# Patient Record
Sex: FEMALE | Employment: UNEMPLOYED | URBAN - METROPOLITAN AREA
[De-identification: names, ages, dates, MRNs, and addresses within clinical notes are randomized per-mention and may not be internally consistent; named-entity substitution may affect disease eponyms.]

---

## 2021-09-21 ENCOUNTER — TELEPHONE (OUTPATIENT)
Dept: NEUROLOGY | Facility: CLINIC | Age: 56
End: 2021-09-21

## 2021-09-21 NOTE — TELEPHONE ENCOUNTER
Pt dropped off disc of CT Brain dated 9/18/21 & CT Cervical dated 5/2/21 from [facility].  Uploaded disc and returned to pt. Copied reports gave back to pt. Placed copy of reports on Dr. Yannick Catherine desk for review.

## 2021-10-11 ENCOUNTER — OFFICE VISIT (OUTPATIENT)
Dept: NEUROLOGY | Facility: CLINIC | Age: 56
End: 2021-10-11
Payer: MEDICAID

## 2021-10-11 ENCOUNTER — TELEPHONE (OUTPATIENT)
Dept: NEUROLOGY | Facility: CLINIC | Age: 56
End: 2021-10-11

## 2021-10-11 VITALS
DIASTOLIC BLOOD PRESSURE: 84 MMHG | RESPIRATION RATE: 16 BRPM | WEIGHT: 143 LBS | HEART RATE: 78 BPM | SYSTOLIC BLOOD PRESSURE: 126 MMHG

## 2021-10-11 DIAGNOSIS — R51.9 INTRACTABLE EPISODIC HEADACHE, UNSPECIFIED HEADACHE TYPE: ICD-10-CM

## 2021-10-11 DIAGNOSIS — R41.89 COGNITIVE DEFICITS: ICD-10-CM

## 2021-10-11 DIAGNOSIS — R42 DIZZINESS: Primary | ICD-10-CM
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
## 2021-10-11 PROCEDURE — 3074F SYST BP LT 130 MM HG: CPT | Performed by: OTHER

## 2021-10-11 PROCEDURE — 99205 OFFICE O/P NEW HI 60 MIN: CPT | Performed by: OTHER

## 2021-10-11 PROCEDURE — 3079F DIAST BP 80-89 MM HG: CPT | Performed by: OTHER

## 2021-10-11 RX ORDER — SUMATRIPTAN 50 MG/1
TABLET, FILM COATED ORAL
Qty: 9 TABLET | Refills: 0 | Status: SHIPPED | OUTPATIENT
Start: 2021-10-11 | End: 2021-11-17

## 2021-10-11 NOTE — TELEPHONE ENCOUNTER
Pt filled out DOMINGA to get medical records from [facility] for all records from 5/21 - 9/21. Faxed DOMINGA/request to "[employer]", fax confirmed. Sent signed DOMINGA to scanning.

## 2021-10-11 NOTE — TELEPHONE ENCOUNTER
Sent signed Reports of CT Brain dated 9/18/21 & CT Cervical dated 5/2/21 from [facility] to scanning.

## 2021-10-11 NOTE — TELEPHONE ENCOUNTER
Recd medical records from [facility] for all records from 5/21 - 9/21. Placed on providers desk for review.

## 2021-10-11 NOTE — PROGRESS NOTES
[facility]    Date of consult: 10/11/2021    CC/Reason for consult: headache, dizziness, difficulty focusing    HPI: Myriam Ramon is a 49year old female with past medical history as listed below presents here for initial nayan lb (64.9 kg)   General: Awake and alert; in no acute distress  HEENT: Eye sclerae are anicteric; scalp is atrauamatic; no thyromegaly  Neck: Supple; no carotid bruits  Cardiac: Regular rate and regular rhythm  Lungs: Clear to auscultation bilaterally  Abdo [address]  10/11/2021, 10:40 AM

## 2021-10-13 NOTE — TELEPHONE ENCOUNTER
Sent signed medical records from [facility] for all records from 5/21 - 9/21 to scanning.

## 2021-10-14 ENCOUNTER — TELEPHONE (OUTPATIENT)
Dept: NEUROLOGY | Facility: CLINIC | Age: 56
End: 2021-10-14

## 2021-10-14 NOTE — TELEPHONE ENCOUNTER
Cole Dockery MD  3 [facility] Nurse  Please advise pt to establish a PCP asap, thanks. LMTCB - Need to Relay the following information.         EMG 8 PCP providers  [address] [facility] COLLEEN Mejia, [address] [facility]  Dr. Cleopatra Hamilton

## 2021-10-18 NOTE — TELEPHONE ENCOUNTER
Called patient and gave the following information:      EM[facility] 8 PCP providers  [address] Dr.      Patient will reach out to above number and kiraul

## 2021-10-19 ENCOUNTER — HOSPITAL ENCOUNTER (OUTPATIENT)
Dept: MRI IMAGING | Age: 56
Discharge: HOME | End: 2021-10-19
Attending: Other
Payer: MEDICAID

## 2021-10-19 DIAGNOSIS — R42 DIZZINESS: ICD-10-CM

## 2021-10-19 PROCEDURE — 70551 MRI BRAIN STEM W/O DYE: CPT | Performed by: OTHER

## 2021-10-26 ENCOUNTER — TELEPHONE (OUTPATIENT)
Dept: INTERNAL MEDICINE CLINIC | Facility: CLINIC | Age: 56
End: 2021-10-26

## 2021-10-26 ENCOUNTER — OFFICE VISIT (OUTPATIENT)
Dept: INTERNAL MEDICINE CLINIC | Facility: CLINIC | Age: 56
End: 2021-10-26
Payer: MEDICAID

## 2021-10-26 VITALS
TEMPERATURE: 99 F | RESPIRATION RATE: 14 BRPM | OXYGEN SATURATION: 97 % | WEIGHT: 140 LBS | HEIGHT: 63.68 IN | HEART RATE: 87 BPM | DIASTOLIC BLOOD PRESSURE: 82 MMHG | BODY MASS INDEX: 24.2 KG/M2 | SYSTOLIC BLOOD PRESSURE: 118 MMHG

## 2021-10-26 DIAGNOSIS — Z01.419 WOMEN'S ANNUAL ROUTINE GYNECOLOGICAL EXAMINATION: ICD-10-CM

## 2021-10-26 DIAGNOSIS — Z91.51 HISTORY OF SUICIDE ATTEMPT: ICD-10-CM

## 2021-10-26 DIAGNOSIS — K50.10 CROHN'S DISEASE OF COLON WITHOUT COMPLICATION (HCC): ICD-10-CM

## 2021-10-26 DIAGNOSIS — Z00.00 ROUTINE PHYSICAL EXAMINATION: Primary | ICD-10-CM

## 2021-10-26 DIAGNOSIS — R51.9 INTRACTABLE EPISODIC HEADACHE, UNSPECIFIED HEADACHE TYPE: ICD-10-CM

## 2021-10-26 DIAGNOSIS — F17.210 CIGARETTE SMOKER: ICD-10-CM

## 2021-10-26 DIAGNOSIS — Z12.31 ENCOUNTER FOR SCREENING MAMMOGRAM FOR MALIGNANT NEOPLASM OF BREAST: ICD-10-CM

## 2021-10-26 DIAGNOSIS — Z12.4 CERVICAL CANCER SCREENING: ICD-10-CM

## 2021-10-26 PROCEDURE — 3074F SYST BP LT 130 MM HG: CPT | Performed by: INTERNAL MEDICINE

## 2021-10-26 PROCEDURE — 87624 HPV HI-RISK TYP POOLED RSLT: CPT | Performed by: INTERNAL MEDICINE

## 2021-10-26 PROCEDURE — 99406 BEHAV CHNG SMOKING 3-10 MIN: CPT | Performed by: INTERNAL MEDICINE

## 2021-10-26 PROCEDURE — 3008F BODY MASS INDEX DOCD: CPT | Performed by: INTERNAL MEDICINE

## 2021-10-26 PROCEDURE — 88175 CYTOPATH C/V AUTO FLUID REDO: CPT | Performed by: INTERNAL MEDICINE

## 2021-10-26 PROCEDURE — 96127 BRIEF EMOTIONAL/BEHAV ASSMT: CPT | Performed by: INTERNAL MEDICINE

## 2021-10-26 PROCEDURE — 99386 PREV VISIT NEW AGE 40-64: CPT | Performed by: INTERNAL MEDICINE

## 2021-10-26 PROCEDURE — 3079F DIAST BP 80-89 MM HG: CPT | Performed by: INTERNAL MEDICINE

## 2021-10-26 RX ORDER — BUDESONIDE 3 MG/1
3 CAPSULE, COATED PELLETS ORAL AS NEEDED
Qty: 30 CAPSULE | Refills: 0 | COMMUNITY
Start: 2021-10-26 | End: 2021-12-07

## 2021-10-26 RX ORDER — CYCLOBENZAPRINE HCL 10 MG
TABLET ORAL AS NEEDED
COMMUNITY
Start: 2021-05-03 | End: 2021-12-07

## 2021-10-26 RX ORDER — DICYCLOMINE HYDROCHLORIDE 10 MG/1
10 CAPSULE ORAL AS NEEDED
COMMUNITY

## 2021-10-26 NOTE — TELEPHONE ENCOUNTER
Patient walked into office and wanted Dr. Dima Gallegos to know she is also taking the medication Budesonide #MG capsules/ 3 tabs every day. The medication was prescribed by Dr. Brittani Ku.

## 2021-10-26 NOTE — PROGRESS NOTES
Tobacco Cessation Documentation (Smoking and Smokeless included): I had an in depth therapy session with Mariela Parekh about her tobacco use risks and options using the [facility]'s Five A's approach:    Ask: Vito Navarrete is using tobacco products. Assess:  We

## 2021-10-26 NOTE — PATIENT INSTRUCTIONS
Please take 2000 IU of vitamin D daily   Please schedule your blood tests.  You will need to fast for 8-10 hours prior to the test if you are able to    I have sent in a prescription for the nicotine gum    Continue seeing your therapist    Please schedule
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
## 2021-10-27 NOTE — TELEPHONE ENCOUNTER
LVMTCB   Have patient clarify the [facility]     As the medication Budesonide 3mg oral capsules was sent to the pharmacy yesterday

## 2021-11-01 NOTE — PROGRESS NOTES
Dear RN, please let the patient know   1. Pap test is negative. We will repeat again in 3 years.  [facility] DO

## 2021-11-05 ENCOUNTER — LAB ENCOUNTER (OUTPATIENT)
Dept: LAB | Age: 56
End: 2021-11-05
Attending: INTERNAL MEDICINE
Payer: MEDICAID

## 2021-11-05 DIAGNOSIS — Z00.00 ROUTINE PHYSICAL EXAMINATION: ICD-10-CM

## 2021-11-05 PROCEDURE — 84460 ALANINE AMINO (ALT) (SGPT): CPT

## 2021-11-05 PROCEDURE — 85025 COMPLETE CBC W/AUTO DIFF WBC: CPT | Performed by: INTERNAL MEDICINE

## 2021-11-05 PROCEDURE — 83036 HEMOGLOBIN GLYCOSYLATED A1C: CPT

## 2021-11-05 PROCEDURE — 84443 ASSAY THYROID STIM HORMONE: CPT

## 2021-11-05 PROCEDURE — 36415 COLL VENOUS BLD VENIPUNCTURE: CPT | Performed by: INTERNAL MEDICINE

## 2021-11-05 PROCEDURE — 80048 BASIC METABOLIC PNL TOTAL CA: CPT | Performed by: INTERNAL MEDICINE

## 2021-11-05 PROCEDURE — 84450 TRANSFERASE (AST) (SGOT): CPT

## 2021-11-05 PROCEDURE — 80061 LIPID PANEL: CPT | Performed by: INTERNAL MEDICINE

## 2021-11-08 ENCOUNTER — NURSE ONLY (OUTPATIENT)
Dept: ELECTROPHYSIOLOGY | Facility: HOSPITAL | Age: 56
End: 2021-11-08
Attending: Other
Payer: MEDICAID

## 2021-11-08 DIAGNOSIS — R42 DIZZINESS: ICD-10-CM

## 2021-11-08 DIAGNOSIS — R41.89 COGNITIVE DEFICITS: ICD-10-CM

## 2021-11-08 PROCEDURE — 95816 EEG AWAKE AND DROWSY: CPT | Performed by: OTHER

## 2021-11-08 NOTE — PROGRESS NOTES
Dear RN, please let the patient know   1.  Diabetes, liver, thyroid tests are normal  Please let me know if you have any questions  Anh Waldrop DO

## 2021-11-08 NOTE — PROGRESS NOTES
Dear RN, please let the patient know   1. Kidney function is normal  2. Cholesterol is high, but no need for any medications at this time. Stopping smoking will help decrease the levels.    Please let me know if you have any questions   Anh Bermeo Lands

## 2021-11-10 ENCOUNTER — TELEPHONE (OUTPATIENT)
Dept: NEUROLOGY | Facility: CLINIC | Age: 56
End: 2021-11-10

## 2021-11-10 RX ORDER — LEVETIRACETAM 500 MG/1
500 TABLET ORAL 2 TIMES DAILY
Qty: 60 TABLET | Refills: 1 | Status: SHIPPED | OUTPATIENT
Start: 2021-11-10 | End: 2021-12-07

## 2021-11-10 NOTE — TELEPHONE ENCOUNTER
Call transferred from [facility] staff. Relayed below to pt. Verbalized understanding. Is agreeable to starting medication.  Confirmed pharmacy, [facility] in [place] off [address]

## 2021-11-10 NOTE — TELEPHONE ENCOUNTER
Attempted to call again, pt did not answer and no VM set up to leave a message. Will try again later.

## 2021-11-10 NOTE — TELEPHONE ENCOUNTER
I attempted to call pt to discuss eeg result but no answer, unable to leave voice mail as it was not set up. Please call pt, eeg is abnormal, concerning for possible seizure, would recommend depakote 500 mg bid or keppra 500 mg bid.    Please advise pt yadira

## 2021-11-10 NOTE — PROCEDURES
Date of Procedure: 11/8/2021    Procedure: EEG (ELECTROENCEPHALOGRAM)     DIZZINESS, COGNITIVE DEFICITS  HX: PT IS A 50 YO FEMALE WHO PRESENTS FOR EVAULUATION OF DIZZINESS AND COGNITIVE DEFICITS.    PMH: THYROID DISEASE, KIDNEY, BLADDER PROBLEMS  RX: Daily Cabrales

## 2021-11-12 ENCOUNTER — TELEPHONE (OUTPATIENT)
Dept: INTERNAL MEDICINE CLINIC | Facility: CLINIC | Age: 56
End: 2021-11-12

## 2021-11-12 NOTE — TELEPHONE ENCOUNTER
[phone]    Medical records request in drawer.

## 2021-11-16 ENCOUNTER — OFFICE VISIT (OUTPATIENT)
Dept: NEUROLOGY | Facility: CLINIC | Age: 56
End: 2021-11-16
Payer: MEDICAID

## 2021-11-16 VITALS
DIASTOLIC BLOOD PRESSURE: 72 MMHG | RESPIRATION RATE: 18 BRPM | SYSTOLIC BLOOD PRESSURE: 100 MMHG | BODY MASS INDEX: 24.2 KG/M2 | WEIGHT: 140 LBS | HEIGHT: 63.68 IN | HEART RATE: 78 BPM

## 2021-11-16 DIAGNOSIS — G40.909 SEIZURE DISORDER (HCC): Primary | ICD-10-CM

## 2021-11-16 PROCEDURE — 99215 OFFICE O/P EST HI 40 MIN: CPT | Performed by: OTHER

## 2021-11-16 PROCEDURE — 3074F SYST BP LT 130 MM HG: CPT | Performed by: OTHER

## 2021-11-16 PROCEDURE — 3008F BODY MASS INDEX DOCD: CPT | Performed by: OTHER

## 2021-11-16 PROCEDURE — 3078F DIAST BP <80 MM HG: CPT | Performed by: OTHER

## 2021-11-16 NOTE — PROGRESS NOTES
[facility] Neurology Outpatient Progress Note  Date of service: 11/16/2021    Patient here for a follow-up visit for headache, difficulty focusing. Since last visit symptoms have not improved.  Her EEG is suggestive of low threshold for seizure thus AED keppra was Use at onset; repeat once after 2 hours-ONLY 2 IN 24 HR MAX. This is a 30 day supply. , Disp: 9 tablet, Rfl: 0  •  nicotine polacrilex (NICORETTE) 2 MG Mouth/Throat Gum, Take 1 each (2 mg total) by mouth Q24H PRN for Smoking cessation.  (Patient not taking: worsening symptoms and contact office. I advised that patient should not drive, operate heavy machine, climb ladder, swim, or tub bath for seizure precaution unless patient has been seizure free, patient verbalized understanding.  Letter given    Ellen Cote (

## 2021-11-17 DIAGNOSIS — R51.9 INTRACTABLE EPISODIC HEADACHE, UNSPECIFIED HEADACHE TYPE: Primary | ICD-10-CM

## 2021-11-17 RX ORDER — SUMATRIPTAN 50 MG/1
TABLET, FILM COATED ORAL
Qty: 9 TABLET | Refills: 2 | Status: SHIPPED | OUTPATIENT
Start: 2021-11-17

## 2021-11-17 NOTE — TELEPHONE ENCOUNTER
Medication: SUMATRIPTAN 50 MG Oral Tab     Date of last refill: 10/11/2021 (#9/0)  [place]     Last office visit: 11/16/2021  Due back to clinic per last office note:  Around 01/16/2022  Date next office visit schedu

## 2021-12-03 ENCOUNTER — TELEPHONE (OUTPATIENT)
Dept: NEUROLOGY | Facility: CLINIC | Age: 56
End: 2021-12-03

## 2021-12-03 ENCOUNTER — HOSPITAL ENCOUNTER (OUTPATIENT)
Dept: MAMMOGRAPHY | Age: 56
Discharge: HOME | End: 2021-12-03
Attending: INTERNAL MEDICINE
Payer: MEDICAID

## 2021-12-03 DIAGNOSIS — Z12.31 ENCOUNTER FOR SCREENING MAMMOGRAM FOR MALIGNANT NEOPLASM OF BREAST: ICD-10-CM

## 2021-12-03 PROCEDURE — 77063 BREAST TOMOSYNTHESIS BI: CPT | Performed by: INTERNAL MEDICINE

## 2021-12-03 PROCEDURE — 77067 SCR MAMMO BI INCL CAD: CPT | Performed by: INTERNAL MEDICINE

## 2021-12-03 NOTE — TELEPHONE ENCOUNTER
Pt came in to office stating her seizures are getting worse. Marquis f/u appt for 12/14/21.   Phone # [phone]

## 2021-12-04 ENCOUNTER — HOSPITAL ENCOUNTER (EMERGENCY)
Facility: HOSPITAL | Age: 56
Discharge: HOME | End: 2021-12-04
Attending: EMERGENCY MEDICINE
Payer: MEDICAID

## 2021-12-04 ENCOUNTER — APPOINTMENT (OUTPATIENT)
Dept: CT IMAGING | Facility: HOSPITAL | Age: 56
End: 2021-12-04
Attending: EMERGENCY MEDICINE
Payer: MEDICAID

## 2021-12-04 VITALS
OXYGEN SATURATION: 98 % | RESPIRATION RATE: 22 BRPM | TEMPERATURE: 97 F | SYSTOLIC BLOOD PRESSURE: 127 MMHG | DIASTOLIC BLOOD PRESSURE: 79 MMHG | HEART RATE: 78 BPM

## 2021-12-04 DIAGNOSIS — G40.909 SEIZURE DISORDER (HCC): ICD-10-CM

## 2021-12-04 DIAGNOSIS — R42 DIZZINESS: Primary | ICD-10-CM

## 2021-12-04 DIAGNOSIS — R53.83 FATIGUE, UNSPECIFIED TYPE: ICD-10-CM

## 2021-12-04 PROCEDURE — 82962 GLUCOSE BLOOD TEST: CPT

## 2021-12-04 PROCEDURE — 81003 URINALYSIS AUTO W/O SCOPE: CPT | Performed by: EMERGENCY MEDICINE

## 2021-12-04 PROCEDURE — 93005 ELECTROCARDIOGRAM TRACING: CPT

## 2021-12-04 PROCEDURE — 85025 COMPLETE CBC W/AUTO DIFF WBC: CPT | Performed by: EMERGENCY MEDICINE

## 2021-12-04 PROCEDURE — 99284 EMERGENCY DEPT VISIT MOD MDM: CPT

## 2021-12-04 PROCEDURE — 36415 COLL VENOUS BLD VENIPUNCTURE: CPT

## 2021-12-04 PROCEDURE — 70450 CT HEAD/BRAIN W/O DYE: CPT | Performed by: EMERGENCY MEDICINE

## 2021-12-04 PROCEDURE — 80053 COMPREHEN METABOLIC PANEL: CPT | Performed by: EMERGENCY MEDICINE

## 2021-12-04 PROCEDURE — 93010 ELECTROCARDIOGRAM REPORT: CPT

## 2021-12-04 RX ORDER — POTASSIUM CHLORIDE 20 MEQ/1
40 TABLET, EXTENDED RELEASE ORAL ONCE
Status: COMPLETED | OUTPATIENT
Start: 2021-12-04 | End: 2021-12-04

## 2021-12-04 RX ORDER — ACETAMINOPHEN 500 MG
1000 TABLET ORAL ONCE
Status: COMPLETED | OUTPATIENT
Start: 2021-12-04 | End: 2021-12-04

## 2021-12-04 NOTE — ED INITIAL ASSESSMENT (HPI)
Pt reports she is not feeling right (dizzy, feels like she can't focus). She has recently been diagnosed with seizures for which she takes keppra. She also has hx of migraines.   Reports she couldn't get in to see her PCP so she was told to come to the ER

## 2021-12-05 NOTE — ED PROVIDER NOTES
.  Patient Seen in: [facility] Emergency Department      History   Patient presents with:  Dizziness  Hypoglycemia  Seizure Disorder    Stated Complaint: dizzy, light headed     Subjective:   HPI    Patient is a 51-year-old woman who presents the jovanni (36.3 °C) (Temporal)   Resp 22   LMP  (LMP Unknown)   SpO2 98%         Physical Exam    General: Patient is resting comfortably in no acute distress  HEENT: Normal cephalic atraumatic. Nonicteric sclera. Moist mucous membranes. No meningismus.   No adeno process. Report, CT reviewed         MDM      Rapid Covid negative. Hemoglobin normal.  White count 6.3. Potassium is 3.3, patient refused potassium here. Urine without signs of infection. Also symptoms unclear etiology.   Possibly medication relat

## 2021-12-05 NOTE — ED QUICK NOTES
Patient refused PO potassium and Dr Judson Gardiner was notified. One tab was wasted in the room after tab was removed from packaging.

## 2021-12-07 ENCOUNTER — OFFICE VISIT (OUTPATIENT)
Dept: NEUROLOGY | Facility: CLINIC | Age: 56
End: 2021-12-07
Payer: MEDICAID

## 2021-12-07 VITALS
HEART RATE: 94 BPM | BODY MASS INDEX: 25.23 KG/M2 | SYSTOLIC BLOOD PRESSURE: 138 MMHG | DIASTOLIC BLOOD PRESSURE: 70 MMHG | WEIGHT: 146 LBS | HEIGHT: 63.68 IN | RESPIRATION RATE: 18 BRPM

## 2021-12-07 DIAGNOSIS — G40.909 SEIZURE DISORDER (HCC): ICD-10-CM

## 2021-12-07 DIAGNOSIS — R42 DIZZINESS: Primary | ICD-10-CM

## 2021-12-07 PROCEDURE — 3008F BODY MASS INDEX DOCD: CPT | Performed by: OTHER

## 2021-12-07 PROCEDURE — 3075F SYST BP GE 130 - 139MM HG: CPT | Performed by: OTHER

## 2021-12-07 PROCEDURE — 3078F DIAST BP <80 MM HG: CPT | Performed by: OTHER

## 2021-12-07 PROCEDURE — 99215 OFFICE O/P EST HI 40 MIN: CPT | Performed by: OTHER

## 2021-12-07 RX ORDER — LEVETIRACETAM 500 MG/1
500 TABLET ORAL 2 TIMES DAILY
Qty: 60 TABLET | Refills: 1 | Status: SHIPPED | OUTPATIENT
Start: 2021-12-07

## 2021-12-07 RX ORDER — MECLIZINE HCL 12.5 MG/1
12.5 TABLET ORAL 3 TIMES DAILY PRN
Qty: 20 TABLET | Refills: 0 | Status: SHIPPED | OUTPATIENT
Start: 2021-12-07 | End: 2021-12-28

## 2021-12-07 NOTE — PROGRESS NOTES
LOV 11/16/21 Seizure & Migraine f/u- Patient states she went to ED 12/4/21 for dizziness. Patient states she has still been experiencing dizziness since ED. Patient had CT BRAIN/HEAD in ED 12/4/21. Patient states she has had headaches since LOV.  Patient st
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
## 2021-12-10 ENCOUNTER — TELEPHONE (OUTPATIENT)
Dept: NEUROLOGY | Facility: CLINIC | Age: 56
End: 2021-12-10

## 2021-12-10 DIAGNOSIS — G40.909 SEIZURE DISORDER (HCC): Primary | ICD-10-CM

## 2021-12-10 NOTE — TELEPHONE ENCOUNTER
Willard Mack MD  [facility] 58 EMU inpatient EEG monitoring needed , please advise as to how to proceed, thanks, [facility] with pt. First step is to get registered as pt at [facility].      Provided number [phone]    Once registered,

## 2021-12-20 NOTE — TELEPHONE ENCOUNTER
Celine called indicating that the order that was faxed was to obtain a neurologist.  Patient indicated that it should of been for EEG. Patient requesting to get EEG order sent over and would like a call to confirm it was sent.

## 2021-12-21 NOTE — TELEPHONE ENCOUNTER
Order for inpatient EEG monitoring is not available to order in Epic.      Did place as speciality other referral to see if that will be sufficient     Attempted to call pt to inform, did not  phone and did not have personalized greeting to leave det

## 2021-12-22 NOTE — TELEPHONE ENCOUNTER
Pt returned call; asked to have specialty referral faxed to [facility]; faxed to [phone], confirmation rec'd.

## 2021-12-28 RX ORDER — MECLIZINE HCL 12.5 MG/1
TABLET ORAL
Qty: 20 TABLET | Refills: 0 | Status: SHIPPED | OUTPATIENT
Start: 2021-12-28 | End: 2022-01-25

## 2022-01-03 ENCOUNTER — TELEPHONE (OUTPATIENT)
Dept: INTERNAL MEDICINE CLINIC | Facility: CLINIC | Age: 57
End: 2022-01-03

## 2022-01-03 NOTE — TELEPHONE ENCOUNTER
Received FMLA paperwork for patient   Per SV appointment needed   Please call patient to schedule a appointment    fmla forms placed in SV in-basket

## 2022-01-04 NOTE — TELEPHONE ENCOUNTER
Pt returning our call, says she has no idea what we are talking about. She has not requested any FMLA forms to be filled out. Pt is wondering if this is for another Vida Yost that works for [employer].  She no longer works there and ha

## 2022-01-06 ENCOUNTER — HOSPITAL ENCOUNTER (OUTPATIENT)
Age: 57
Discharge: HOME | End: 2022-01-06
Payer: MEDICAID

## 2022-01-06 ENCOUNTER — TELEPHONE (OUTPATIENT)
Dept: INTERNAL MEDICINE CLINIC | Facility: CLINIC | Age: 57
End: 2022-01-06

## 2022-01-06 VITALS
OXYGEN SATURATION: 100 % | HEART RATE: 84 BPM | DIASTOLIC BLOOD PRESSURE: 78 MMHG | BODY MASS INDEX: 24.75 KG/M2 | RESPIRATION RATE: 16 BRPM | TEMPERATURE: 98 F | SYSTOLIC BLOOD PRESSURE: 146 MMHG | WEIGHT: 145 LBS | HEIGHT: 64 IN

## 2022-01-06 DIAGNOSIS — R21 RASH OF HAND: Primary | ICD-10-CM

## 2022-01-06 LAB — SARS-COV-2 RNA RESP QL NAA+PROBE: NOT DETECTED

## 2022-01-06 PROCEDURE — 99213 OFFICE O/P EST LOW 20 MIN: CPT

## 2022-01-06 RX ORDER — PREDNISONE 20 MG/1
20 TABLET ORAL 2 TIMES DAILY
Qty: 10 TABLET | Refills: 0 | Status: SHIPPED | OUTPATIENT
Start: 2022-01-06 | End: 2022-01-11

## 2022-01-06 NOTE — TELEPHONE ENCOUNTER
Patient reporting right arm swollen, red with rash-like bumps since yesterday  Cough  Sinus congestion    Denies shortness of breath, fever, chest pain. No other s/s. Denies any recent covid exposure that patient is aware of.      Explained to patient that

## 2022-01-06 NOTE — ED PROVIDER NOTES
Patient Seen in: [facility]      History   Patient presents with:  Rash Skin Problem    Stated Complaint: right arm hand and arm swelling with rash    Subjective:   53-year-old female presents to the IC with right hand swelling with a evelyn kg/m²         Physical Exam    GENERAL: The patient is well-developed well-nourished nontoxic, non-ill-appearing  HEENT: Normocephalic. Atraumatic. Extraocular motions are intact  NECK: Supple. No meningitic signs are noted.    CHEST/LUNGS: Clear to ausc

## 2022-01-06 NOTE — ED INITIAL ASSESSMENT (HPI)
Patient reports right hand swelling and red, itchy rash to right hand and arm x 2 days. Also reports headache, sore throat, congestion and bodyaches.

## 2022-01-06 NOTE — TELEPHONE ENCOUNTER
Pt calling because she has a congestion and sore throat since yesterday and woke up with her R arm \"really' swollen. No appt available.

## 2022-01-24 DIAGNOSIS — R42 DIZZINESS: Primary | ICD-10-CM

## 2022-01-25 RX ORDER — MECLIZINE HCL 12.5 MG/1
TABLET ORAL
Qty: 20 TABLET | Refills: 0 | Status: SHIPPED | OUTPATIENT
Start: 2022-01-25

## 2022-01-25 NOTE — TELEPHONE ENCOUNTER
Medication: MECLIZINE 12.5 MG Oral Tab     Date of last refill: 12/28/21 (#20/0)  [place]     Last office visit: 12/7/21  Due back to clinic per last office note:  RTC 3 months  Date next office visit scheduled:    F focusing. Since last visit symptoms have not improved. Her EEG is suggestive of low threshold for seizure thus AED keppra was started, pt reported increased fatigue and drowsiness but willing to continue for 1-2 weeks and see how she will be doing.   Her br 1 each (2 mg total) by mouth Q24H PRN for Smoking cessation.  (Patient not taking: No sig reported), Disp: 150 each, Rfl: 3  Allergies:  No Known Allergies       Past Medical History:   Diagnosis Date   • Migraines     • [facility]

## 2022-02-03 ENCOUNTER — TELEPHONE (OUTPATIENT)
Dept: INTERNAL MEDICINE CLINIC | Facility: CLINIC | Age: 57
End: 2022-02-03

## 2022-02-03 NOTE — TELEPHONE ENCOUNTER
Appt scheduled. Future Appointments   Date Time Provider Stephane Park   2/8/2022 11:30 AM Gilford Cane, DO [facility] [address]   4/5/2022 10:00 AM Cassius Vargas MD [facility] [address] [facility] [address]       Pt has numbness and red blotches( come and go) in R foot x few months, Pt is a smoker, has asthma, and allergies. Chronic cough, nasal and chest congestion x 2 months had negative covid test 01/06/2022. Is pt ok to come in person? Is pt ok to wait until scheduled appt?

## 2022-02-08 PROBLEM — R56.9 SEIZURE (HCC): Status: ACTIVE | Noted: 2021-11-16

## 2022-02-08 PROBLEM — I73.00 RAYNAUD'S PHENOMENON WITHOUT GANGRENE: Status: ACTIVE | Noted: 2022-02-08

## 2022-02-08 PROBLEM — F41.9 ANXIETY: Status: ACTIVE | Noted: 2022-02-08

## 2022-02-08 NOTE — PATIENT INSTRUCTIONS
[facility] [phone]    [address] [place] [address] [phone]    Please call the above numbers and I have placed a referral to our  as well in regards to finding a psychiatrist    You can take the lexapro in the morning and xanax only as needed    Amlodipine is for the cold feet and I have ordered a special ultrasound to check for circulation    See me back in 1 month

## 2022-02-09 RX ORDER — ESCITALOPRAM OXALATE 10 MG/1
10 TABLET ORAL DAILY
Qty: 30 TABLET | Refills: 0 | Status: SHIPPED | OUTPATIENT
Start: 2022-02-09 | End: 2022-03-09

## 2022-02-09 NOTE — TELEPHONE ENCOUNTER
[facility] is requesting a new RX for the Lexapro 10 mg die to her insurance doesn't cover Escitalopram.     She also needs a new rx showing the 10mg

## 2022-03-07 RX ORDER — LEVETIRACETAM 500 MG/1
TABLET ORAL
Qty: 60 TABLET | Refills: 1 | Status: SHIPPED | OUTPATIENT
Start: 2022-03-07

## 2022-03-09 NOTE — PATIENT INSTRUCTIONS
I have refilled your lexapro.  Continue the medication and follow up with your therapist    Please let Dr. Rodrigo Nova know about the skin blotches    I Have referred you for a sleep study and ultrasound    I have also reprinted the order for the GI doctor in regards to the Crohn's and colon cancer screening    See you back in 6 months or sooner for any new symptoms

## 2022-03-24 ENCOUNTER — HOSPITAL ENCOUNTER (OUTPATIENT)
Dept: ULTRASOUND IMAGING | Facility: HOSPITAL | Age: 57
Discharge: HOME | End: 2022-03-24
Attending: INTERNAL MEDICINE
Payer: MEDICAID

## 2022-03-24 DIAGNOSIS — I73.00 RAYNAUD'S PHENOMENON WITHOUT GANGRENE: ICD-10-CM

## 2022-03-24 PROCEDURE — 93922 UPR/L XTREMITY ART 2 LEVELS: CPT | Performed by: INTERNAL MEDICINE

## 2022-03-27 ENCOUNTER — OFFICE VISIT (OUTPATIENT)
Dept: SLEEP CENTER | Age: 57
End: 2022-03-27
Attending: INTERNAL MEDICINE
Payer: MEDICAID

## 2022-03-27 PROCEDURE — 95806 SLEEP STUDY UNATT&RESP EFFT: CPT

## 2022-03-29 NOTE — PROCEDURES
[address] [address]       Accredited by the [facility] of Sleep Medicine ([facility])    PATIENT'S NAME:        Chela Giraldo  ATTENDING PHYSICIAN:   Landon Babcock M.D. REFERRING PHYSICIAN:   Yahir Groev DO  PATIENT ACCOUNT #:     [de-identified]        LOCATION:       [address] [place] [address] #:      [id number]        YOB: 1965  DATE OF STUDY:         03/28/2022    SLEEP STUDY REPORT    STUDY TYPE:  Unattended sleep study. CLINICAL HISTORY:  The patient is a 51-year-old female who weighs 150 pounds and is 5 feet 3 inches tall who has complaints of snoring and hypersomnia. Her [place] Sleepiness Scale score is 11. UNATTENDED SLEEP STUDY RECORDING PARAMETERS:  The patient underwent a formal technically adequate unattended diagnostic sleep study [facility]. The study was performed in accordance with the AAS standard for Out of Center Sleep Testing. The four-channel Type III HST measures the following parameters:  flow, respiratory effort, pulse, and oxygen saturation. SCORING:  This study was scored in accordance with AASM scoring rules and Medicare rule 1B. POLYSOMNOGRAPHIC FINDINGS:  The total recording time was 11 hours 59 minutes. Recording started at 9:41 p.m., recording ended at 9:41 a.m.     BODY POSITION:  The patient spent 64% of the recording time in the supine position. RESPIRATORY MONITORING:  The respiratory event index (AHI) was 3 events per hour. Throughout the study, the patient maintained an oxyhemoglobin saturation above 90%. DIAGNOSTIC CODE:  G47.10. IMPRESSION:  This study did not demonstrate any evidence of sleep-disordered breathing. RECOMMENDATIONS:  Further followup with the referring provider.     Dictated By Landon Babcock M.D.  d: 03/29/2022 16:52:37  t: 03/29/2022 18:18:09  Job [id number]  JV/    cc: Yahir Grove DO

## 2022-04-05 ENCOUNTER — OFFICE VISIT (OUTPATIENT)
Dept: NEUROLOGY | Facility: CLINIC | Age: 57
End: 2022-04-05
Payer: MEDICAID

## 2022-04-05 VITALS
RESPIRATION RATE: 16 BRPM | HEART RATE: 68 BPM | DIASTOLIC BLOOD PRESSURE: 80 MMHG | BODY MASS INDEX: 26 KG/M2 | WEIGHT: 150 LBS | OXYGEN SATURATION: 100 % | SYSTOLIC BLOOD PRESSURE: 122 MMHG

## 2022-04-05 DIAGNOSIS — R20.0 NUMBNESS: ICD-10-CM

## 2022-04-05 DIAGNOSIS — L29.9 ITCHY SKIN: ICD-10-CM

## 2022-04-05 DIAGNOSIS — R51.9 INTRACTABLE EPISODIC HEADACHE, UNSPECIFIED HEADACHE TYPE: ICD-10-CM

## 2022-04-05 DIAGNOSIS — G40.909 SEIZURE DISORDER (HCC): Primary | ICD-10-CM

## 2022-04-05 PROCEDURE — 3079F DIAST BP 80-89 MM HG: CPT | Performed by: OTHER

## 2022-04-05 PROCEDURE — 99215 OFFICE O/P EST HI 40 MIN: CPT | Performed by: OTHER

## 2022-04-05 PROCEDURE — 3074F SYST BP LT 130 MM HG: CPT | Performed by: OTHER

## 2022-04-05 RX ORDER — GABAPENTIN 300 MG/1
300 CAPSULE ORAL 2 TIMES DAILY
Qty: 60 CAPSULE | Refills: 3 | Status: SHIPPED | OUTPATIENT
Start: 2022-04-05

## 2022-04-05 RX ORDER — SUMATRIPTAN 50 MG/1
TABLET, FILM COATED ORAL
Qty: 9 TABLET | Refills: 2 | Status: SHIPPED | OUTPATIENT
Start: 2022-04-05

## 2022-04-05 RX ORDER — BUDESONIDE 3 MG/1
3 CAPSULE, COATED PELLETS ORAL
COMMUNITY

## 2022-04-05 NOTE — PROGRESS NOTES
Pt following up for seizures and dizziness. Pt reports she has been experiencing a lot of itching and is wondering if it is from the [address].

## 2022-05-04 RX ORDER — LEVETIRACETAM 500 MG/1
TABLET ORAL
Qty: 60 TABLET | Refills: 1 | Status: SHIPPED | OUTPATIENT
Start: 2022-05-04

## 2022-05-23 ENCOUNTER — TELEPHONE (OUTPATIENT)
Dept: NEUROLOGY | Facility: CLINIC | Age: 57
End: 2022-05-23

## 2022-05-23 NOTE — TELEPHONE ENCOUNTER
LVM for patient to call office  Confirm appointment on 5/24 in [place] at 11am with Dr. Eliud Steven

## 2022-05-24 ENCOUNTER — PROCEDURE VISIT (OUTPATIENT)
Dept: NEUROLOGY | Facility: CLINIC | Age: 57
End: 2022-05-24
Payer: MEDICAID
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
## 2022-05-24 DIAGNOSIS — M54.12 CERVICAL RADICULAR PAIN: Primary | ICD-10-CM

## 2022-05-24 PROCEDURE — 95910 NRV CNDJ TEST 7-8 STUDIES: CPT | Performed by: OTHER

## 2022-05-24 PROCEDURE — 99213 OFFICE O/P EST LOW 20 MIN: CPT | Performed by: OTHER

## 2022-05-24 PROCEDURE — 95886 MUSC TEST DONE W/N TEST COMP: CPT | Performed by: OTHER

## 2022-05-24 NOTE — PROCEDURES
Date of service: 5/24/2022    Procedure: Nerve Conduction Study and Electromyography - complete EMG study in bilateral upper extremities. History: Electrodiagnostic testing on this 49year old female was performed in bilateral upper extremities. The patient is complaining of paresthesias in upper extremities. Electrodiagnostic testing is as follows. RN was present throughout today's office visit. Findings:  Sensory Nerve Conduction Study (NCS) of left median nerve demonstrated response amplitude of 21.5uV, distal latency of 3.02ms and conduction velocity of 59.4m/s. Sensory Nerve Conduction Study (NCS) of right median nerve demonstrated response amplitude of 28.2uV, distal latency 3.02ms and conduction velocity of 59.4m/s. Sensory NCS of right ulnar nerve demonstrated response amplitude of 12.4uV, distal latency of 3.23ms and conduction velocity of 46.9m/s. Sensory NCS of left ulnar nerve demonstrated response amplitude of 6.9uV, distal latency of 2.86ms and conduction velocity of 54.2m/s. Motor NCS of right median nerve recording Abductor Pollicis Brevis (APB) demonstrated response amplitude of 5.7uV, distal latency of 3.13ms and conduction velocity of 56m/s. Motor NCS of left median nerve recording Abductor Pollicis Brevis (APB) demonstrated response amplitude of 7.8uV, distal latency of 3.54ms and conduction velocity of 68m/s. Motor NCS of right ulnar nerve recording abductor digiti minimi (ADM) demonstrated response amplitude of 9.8uV, distal latency of 2.81ms and conduction velocity of 72m/s below elbow, 50m/s above elbow. Motor NCS of left ulnar nerve recording abductor digiti minimi (ADM) demonstrated  response amplitude of 9uV, distal latency of 2.81ms and conduction velocity of  64m/s below elbow, 50m/s above elbow. Needle electromyography (EMG) of selected muscles representing bilateral C5-T1 myotomes including FIRST D. INTEROSS (FDI), FLEX. CARPI.  RAD (FCR), BICEPS, TRICEPS, DELTOID were tested as the following. Right FIRST D. INTEROSS, FLEX. CARPI. RAD, BICEPS, TRICEPS, DELTOID reveals normal insertional activity, normal spontaneous activity. Motor unit potentials (MUPs) are of normal amplitude and duration with a full recruitment pattern. No fibrillations or positive sharp waves (PSWs) or fasciculations were recorded. Left FIRST D. INTEROSS, FLEX. CARPI. RAD, BICEPS, TRICEPS, DELTOID reveals normal insertional activity, normal spontaneous activity. Motor unit potentials (MUPs) are of normal amplitude and duration with a full recruitment pattern. No fibrillations or positive sharp waves (PSWs) or fasciculations were recorded. Impression:  1. Essentially normal nerve conduction study. 2. There is no electrophysiologic evidence of ongoing denervation in bilateral C5-T1 myotomes suggestive of acute C5-T1 radiculopathy. A cervical radiculopathy above C5 level or chronic C5-T1 radiculopathy cannot be completely excluded from this study, Clinical correlation is recommended.      Jasson Quezada MD  Neurology  [facility]  5/24/2022, 2:16 PM  CC: Yudy Valdovinos, DO

## 2022-05-31 ENCOUNTER — TELEPHONE (OUTPATIENT)
Dept: NEUROLOGY | Facility: CLINIC | Age: 57
End: 2022-05-31

## 2022-05-31 NOTE — TELEPHONE ENCOUNTER
KAILEY for Bakari Tong at [facility]; rec's second req for evidence for disability claim; no previous req ever rec'd; no information regarding info requested; letter sent to scanning

## 2022-05-31 NOTE — TELEPHONE ENCOUNTER
rec'd original req via fax with epilepsy questionaire and req for med recs; endorsed to nurse for completion of form

## 2022-06-29 ENCOUNTER — HOSPITAL ENCOUNTER (EMERGENCY)
Age: 57
Discharge: HOME | End: 2022-06-30
Payer: MEDICAID

## 2022-06-29 ENCOUNTER — APPOINTMENT (OUTPATIENT)
Dept: GENERAL RADIOLOGY | Age: 57
End: 2022-06-29
Payer: MEDICAID

## 2022-06-29 VITALS
HEART RATE: 82 BPM | RESPIRATION RATE: 18 BRPM | DIASTOLIC BLOOD PRESSURE: 84 MMHG | BODY MASS INDEX: 26.46 KG/M2 | HEIGHT: 64 IN | WEIGHT: 155 LBS | TEMPERATURE: 98 F | SYSTOLIC BLOOD PRESSURE: 140 MMHG | OXYGEN SATURATION: 98 %

## 2022-06-29 DIAGNOSIS — T14.8XXA PUNCTURE WOUND: Primary | ICD-10-CM

## 2022-06-29 PROCEDURE — 99283 EMERGENCY DEPT VISIT LOW MDM: CPT

## 2022-06-29 PROCEDURE — 90471 IMMUNIZATION ADMIN: CPT

## 2022-06-29 PROCEDURE — 73630 X-RAY EXAM OF FOOT: CPT

## 2022-06-30 DIAGNOSIS — F41.9 ANXIETY: ICD-10-CM

## 2022-06-30 RX ORDER — CEPHALEXIN 500 MG/1
500 CAPSULE ORAL 4 TIMES DAILY
Qty: 40 CAPSULE | Refills: 0 | Status: SHIPPED | OUTPATIENT
Start: 2022-06-30 | End: 2022-07-10

## 2022-06-30 RX ORDER — CIPROFLOXACIN 500 MG/1
500 TABLET, FILM COATED ORAL 2 TIMES DAILY
Qty: 20 TABLET | Refills: 0 | Status: SHIPPED | OUTPATIENT
Start: 2022-06-30 | End: 2022-07-10

## 2022-06-30 NOTE — TELEPHONE ENCOUNTER
No Protocol     Requesting: alprazolam 0.25mg     LOV: 3/9/22   RTC: 6 months   Filled: 2/8/22 #10 0 refill  Upcoming OV: 9/9/22

## 2022-06-30 NOTE — TELEPHONE ENCOUNTER
Pt is requesting medication refill, currently only has one pill of ALPRAZolam left.  [id number]      ALPRAZolam (XANAX) 0.25 MG Oral Tab 10 tablet

## 2022-07-01 RX ORDER — ALPRAZOLAM 0.25 MG/1
0.25 TABLET ORAL NIGHTLY PRN
Qty: 10 TABLET | Refills: 0 | Status: SHIPPED | OUTPATIENT
Start: 2022-07-01

## 2022-07-03 DIAGNOSIS — G40.909 SEIZURE DISORDER (HCC): ICD-10-CM

## 2022-07-05 RX ORDER — LEVETIRACETAM 500 MG/1
TABLET ORAL
Qty: 60 TABLET | Refills: 1 | Status: SHIPPED | OUTPATIENT
Start: 2022-07-05

## 2022-07-17 ENCOUNTER — APPOINTMENT (OUTPATIENT)
Dept: GENERAL RADIOLOGY | Age: 57
End: 2022-07-17
Attending: EMERGENCY MEDICINE
Payer: MEDICAID

## 2022-07-17 ENCOUNTER — HOSPITAL ENCOUNTER (OUTPATIENT)
Age: 57
Discharge: HOME | End: 2022-07-17
Attending: EMERGENCY MEDICINE
Payer: MEDICAID

## 2022-07-17 VITALS
WEIGHT: 150 LBS | HEIGHT: 63.75 IN | OXYGEN SATURATION: 98 % | RESPIRATION RATE: 20 BRPM | HEART RATE: 75 BPM | BODY MASS INDEX: 25.93 KG/M2 | SYSTOLIC BLOOD PRESSURE: 152 MMHG | DIASTOLIC BLOOD PRESSURE: 81 MMHG | TEMPERATURE: 98 F

## 2022-07-17 DIAGNOSIS — M25.532 LEFT WRIST PAIN: Primary | ICD-10-CM

## 2022-07-17 PROCEDURE — 99213 OFFICE O/P EST LOW 20 MIN: CPT

## 2022-07-17 PROCEDURE — 73110 X-RAY EXAM OF WRIST: CPT | Performed by: EMERGENCY MEDICINE
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
## 2022-08-12 RX ORDER — GABAPENTIN 300 MG/1
CAPSULE ORAL
Qty: 60 CAPSULE | Refills: 3 | Status: SHIPPED | OUTPATIENT
Start: 2022-08-12

## 2022-08-17 ENCOUNTER — TELEPHONE (OUTPATIENT)
Dept: INTERNAL MEDICINE CLINIC | Facility: CLINIC | Age: 57
End: 2022-08-17

## 2022-08-17 DIAGNOSIS — M21.612 BUNION, LEFT FOOT: Primary | ICD-10-CM

## 2022-08-17 DIAGNOSIS — M20.41 HAMMER TOE OF RIGHT FOOT: ICD-10-CM

## 2022-08-17 NOTE — TELEPHONE ENCOUNTER
Pt requesting referral for podiatry. Pt stated she would like to see someone within [place] due to her insurance. Pt has [facility].      Future Appointments   Date Time Provider Stephane Vivian   8/25/2022 10:40 AM Gemini Rodriguez MD [facility] [address] [facility] [address]   9/7/2022  9:30 AM Roberto Jacob DO [facility] [place]   10/4/2022 11:20 AM Gemini Rodriguez MD [facility] [address] [facility] [address]        Confirmed [phone]

## 2022-08-17 NOTE — TELEPHONE ENCOUNTER
Spoke with patient, reports right foot hammer toe and bunion to left foot. Referral pended-please review and sign if appropriate. Thanks!

## 2022-08-25 ENCOUNTER — OFFICE VISIT (OUTPATIENT)
Dept: NEUROLOGY | Facility: CLINIC | Age: 57
End: 2022-08-25
Payer: MEDICAID

## 2022-08-25 VITALS — DIASTOLIC BLOOD PRESSURE: 70 MMHG | HEART RATE: 70 BPM | SYSTOLIC BLOOD PRESSURE: 110 MMHG | RESPIRATION RATE: 16 BRPM

## 2022-08-25 DIAGNOSIS — R51.9 INTRACTABLE EPISODIC HEADACHE, UNSPECIFIED HEADACHE TYPE: ICD-10-CM

## 2022-08-25 DIAGNOSIS — G40.909 SEIZURE DISORDER (HCC): ICD-10-CM

## 2022-08-25 DIAGNOSIS — R41.3 MEMORY DEFICIT: Primary | ICD-10-CM

## 2022-08-25 PROCEDURE — 3074F SYST BP LT 130 MM HG: CPT | Performed by: OTHER

## 2022-08-25 PROCEDURE — 3078F DIAST BP <80 MM HG: CPT | Performed by: OTHER

## 2022-08-25 PROCEDURE — 99215 OFFICE O/P EST HI 40 MIN: CPT | Performed by: OTHER

## 2022-08-25 RX ORDER — SUMATRIPTAN 50 MG/1
TABLET, FILM COATED ORAL
Qty: 9 TABLET | Refills: 3 | Status: SHIPPED | OUTPATIENT
Start: 2022-08-25

## 2022-08-25 RX ORDER — GABAPENTIN 300 MG/1
300 CAPSULE ORAL NIGHTLY
Qty: 90 CAPSULE | Refills: 3 | Status: SHIPPED | OUTPATIENT
Start: 2022-08-25

## 2022-08-25 RX ORDER — ESCITALOPRAM OXALATE 10 MG/1
10 TABLET ORAL DAILY
COMMUNITY

## 2022-08-25 RX ORDER — LEVETIRACETAM 500 MG/1
500 TABLET ORAL 2 TIMES DAILY
Qty: 180 TABLET | Refills: 3 | Status: SHIPPED | OUTPATIENT
Start: 2022-08-25

## 2022-08-26 ENCOUNTER — TELEPHONE (OUTPATIENT)
Dept: NEUROLOGY | Facility: CLINIC | Age: 57
End: 2022-08-26

## 2022-08-26 ENCOUNTER — APPOINTMENT (OUTPATIENT)
Dept: GENERAL RADIOLOGY | Age: 57
End: 2022-08-26
Attending: EMERGENCY MEDICINE
Payer: MEDICAID

## 2022-08-26 ENCOUNTER — HOSPITAL ENCOUNTER (EMERGENCY)
Age: 57
Discharge: HOME | End: 2022-08-26
Attending: EMERGENCY MEDICINE
Payer: MEDICAID

## 2022-08-26 VITALS
HEIGHT: 64 IN | RESPIRATION RATE: 16 BRPM | WEIGHT: 150 LBS | HEART RATE: 67 BPM | DIASTOLIC BLOOD PRESSURE: 81 MMHG | OXYGEN SATURATION: 98 % | SYSTOLIC BLOOD PRESSURE: 137 MMHG | BODY MASS INDEX: 25.61 KG/M2

## 2022-08-26 DIAGNOSIS — R42 DIZZINESS: ICD-10-CM

## 2022-08-26 DIAGNOSIS — G89.29 CHRONIC NONINTRACTABLE HEADACHE, UNSPECIFIED HEADACHE TYPE: Primary | ICD-10-CM

## 2022-08-26 DIAGNOSIS — R51.9 CHRONIC NONINTRACTABLE HEADACHE, UNSPECIFIED HEADACHE TYPE: Primary | ICD-10-CM

## 2022-08-26 LAB
ALBUMIN SERPL-MCNC: 3.9 G/DL (ref 3.4–5)
ALBUMIN/GLOB SERPL: 1.4 {RATIO} (ref 1–2)
ALP LIVER SERPL-CCNC: 86 U/L
ALT SERPL-CCNC: 13 U/L
ANION GAP SERPL CALC-SCNC: 5 MMOL/L (ref 0–18)
AST SERPL-CCNC: 8 U/L (ref 15–37)
ATRIAL RATE: 64 BPM
BASOPHILS # BLD AUTO: 0.03 X10(3) UL (ref 0–0.2)
BASOPHILS NFR BLD AUTO: 0.5 %
BILIRUB SERPL-MCNC: 0.2 MG/DL (ref 0.1–2)
BILIRUB UR QL STRIP.AUTO: NEGATIVE
BUN BLD-MCNC: 13 MG/DL (ref 7–18)
CALCIUM BLD-MCNC: 8.9 MG/DL (ref 8.5–10.1)
CHLORIDE SERPL-SCNC: 107 MMOL/L (ref 98–112)
CLARITY UR REFRACT.AUTO: CLEAR
CO2 SERPL-SCNC: 27 MMOL/L (ref 21–32)
COLOR UR AUTO: YELLOW
CREAT BLD-MCNC: 0.66 MG/DL
EOSINOPHIL # BLD AUTO: 0.22 X10(3) UL (ref 0–0.7)
EOSINOPHIL NFR BLD AUTO: 3.4 %
ERYTHROCYTE [DISTWIDTH] IN BLOOD BY AUTOMATED COUNT: 12.8 %
GFR SERPLBLD BASED ON 1.73 SQ M-ARVRAT: 104 ML/MIN/1.73M2 (ref 60–?)
GLOBULIN PLAS-MCNC: 2.8 G/DL (ref 2.8–4.4)
GLUCOSE BLD-MCNC: 90 MG/DL (ref 70–99)
GLUCOSE UR STRIP.AUTO-MCNC: NEGATIVE MG/DL
HCT VFR BLD AUTO: 42.5 %
HGB BLD-MCNC: 14 G/DL
IMM GRANULOCYTES # BLD AUTO: 0.03 X10(3) UL (ref 0–1)
IMM GRANULOCYTES NFR BLD: 0.5 %
KETONES UR STRIP.AUTO-MCNC: NEGATIVE MG/DL
LYMPHOCYTES # BLD AUTO: 2.01 X10(3) UL (ref 1–4)
LYMPHOCYTES NFR BLD AUTO: 31.4 %
MCH RBC QN AUTO: 30.5 PG (ref 26–34)
MCHC RBC AUTO-ENTMCNC: 32.9 G/DL (ref 31–37)
MCV RBC AUTO: 92.6 FL
MONOCYTES # BLD AUTO: 0.59 X10(3) UL (ref 0.1–1)
MONOCYTES NFR BLD AUTO: 9.2 %
NEUTROPHILS # BLD AUTO: 3.53 X10 (3) UL (ref 1.5–7.7)
NEUTROPHILS # BLD AUTO: 3.53 X10(3) UL (ref 1.5–7.7)
NEUTROPHILS NFR BLD AUTO: 55 %
NITRITE UR QL STRIP.AUTO: NEGATIVE
OSMOLALITY SERPL CALC.SUM OF ELEC: 288 MOSM/KG (ref 275–295)
P AXIS: 53 DEGREES
P-R INTERVAL: 160 MS
PH UR STRIP.AUTO: 6 [PH] (ref 5–8)
PLATELET # BLD AUTO: 275 10(3)UL (ref 150–450)
POTASSIUM SERPL-SCNC: 4.2 MMOL/L (ref 3.5–5.1)
PROT SERPL-MCNC: 6.7 G/DL (ref 6.4–8.2)
PROT UR STRIP.AUTO-MCNC: NEGATIVE MG/DL
Q-T INTERVAL: 444 MS
QRS DURATION: 82 MS
QTC CALCULATION (BEZET): 458 MS
R AXIS: 47 DEGREES
RBC # BLD AUTO: 4.59 X10(6)UL
SODIUM SERPL-SCNC: 139 MMOL/L (ref 136–145)
SP GR UR STRIP.AUTO: 1.02 (ref 1–1.03)
T AXIS: 45 DEGREES
UROBILINOGEN UR STRIP.AUTO-MCNC: 0.2 MG/DL
VENTRICULAR RATE: 64 BPM
WBC # BLD AUTO: 6.4 X10(3) UL (ref 4–11)

## 2022-08-26 PROCEDURE — 96365 THER/PROPH/DIAG IV INF INIT: CPT

## 2022-08-26 PROCEDURE — 85025 COMPLETE CBC W/AUTO DIFF WBC: CPT | Performed by: EMERGENCY MEDICINE

## 2022-08-26 PROCEDURE — 96375 TX/PRO/DX INJ NEW DRUG ADDON: CPT

## 2022-08-26 PROCEDURE — 87086 URINE CULTURE/COLONY COUNT: CPT | Performed by: EMERGENCY MEDICINE

## 2022-08-26 PROCEDURE — 93010 ELECTROCARDIOGRAM REPORT: CPT

## 2022-08-26 PROCEDURE — 99285 EMERGENCY DEPT VISIT HI MDM: CPT

## 2022-08-26 PROCEDURE — 81015 MICROSCOPIC EXAM OF URINE: CPT | Performed by: EMERGENCY MEDICINE

## 2022-08-26 PROCEDURE — 93005 ELECTROCARDIOGRAM TRACING: CPT

## 2022-08-26 PROCEDURE — 99284 EMERGENCY DEPT VISIT MOD MDM: CPT

## 2022-08-26 PROCEDURE — 71045 X-RAY EXAM CHEST 1 VIEW: CPT | Performed by: EMERGENCY MEDICINE

## 2022-08-26 PROCEDURE — 80053 COMPREHEN METABOLIC PANEL: CPT | Performed by: EMERGENCY MEDICINE

## 2022-08-26 PROCEDURE — 81001 URINALYSIS AUTO W/SCOPE: CPT | Performed by: EMERGENCY MEDICINE

## 2022-08-26 RX ORDER — DIPHENHYDRAMINE HYDROCHLORIDE 50 MG/ML
25 INJECTION INTRAMUSCULAR; INTRAVENOUS ONCE
Status: COMPLETED | OUTPATIENT
Start: 2022-08-26 | End: 2022-08-26

## 2022-08-26 RX ORDER — METOCLOPRAMIDE HYDROCHLORIDE 5 MG/ML
10 INJECTION INTRAMUSCULAR; INTRAVENOUS ONCE
Status: COMPLETED | OUTPATIENT
Start: 2022-08-26 | End: 2022-08-26

## 2022-08-26 RX ORDER — KETOROLAC TROMETHAMINE 30 MG/ML
30 INJECTION, SOLUTION INTRAMUSCULAR; INTRAVENOUS ONCE
Status: COMPLETED | OUTPATIENT
Start: 2022-08-26 | End: 2022-08-26

## 2022-08-26 RX ORDER — DEXAMETHASONE SODIUM PHOSPHATE 10 MG/ML
10 INJECTION, SOLUTION INTRAMUSCULAR; INTRAVENOUS ONCE
Status: COMPLETED | OUTPATIENT
Start: 2022-08-26 | End: 2022-08-26

## 2022-08-26 NOTE — TELEPHONE ENCOUNTER
Referral faxed with below information to [phone] per request. Received confirmation fax.      [facility]

## 2022-08-26 NOTE — TELEPHONE ENCOUNTER
Pt is being referred to [facility] by Dr. Rodrigo Nova and she needs referral sent to them. The cover sheet needs to contain the pt name, , Dr. Berna Halsted name and the pts appt date at [facility] which is 10.24.22. Fax 21 [phone]: Ilda Gross. Pt requests call once referral has been faxed/confirmed.

## 2022-08-26 NOTE — ED INITIAL ASSESSMENT (HPI)
Pt reports headache x 3 days with dizziness and \"confusion\". Pt states she has a hx of migraines but her medication is not working and she does not usually get dizziness or confusion. Family reports she has been forgetful.

## 2022-08-29 RX ORDER — MECLIZINE HCL 12.5 MG/1
12.5 TABLET ORAL 3 TIMES DAILY PRN
Qty: 20 TABLET | Refills: 0 | Status: SHIPPED | OUTPATIENT
Start: 2022-08-29

## 2022-08-31 ENCOUNTER — TELEPHONE (OUTPATIENT)
Dept: ORTHOPEDICS CLINIC | Facility: CLINIC | Age: 57
End: 2022-08-31

## 2022-08-31 DIAGNOSIS — M79.672 LEFT FOOT PAIN: Primary | ICD-10-CM

## 2022-08-31 DIAGNOSIS — M79.671 RIGHT FOOT PAIN: ICD-10-CM

## 2022-08-31 NOTE — TELEPHONE ENCOUNTER
Future Appointments   Date Time Provider Stephane Park   9/28/2022  1:30 PM Martita Chain., DPM [facility]     This patient is coming for IVETTE Feet bunion and hammertoe . No prior imaging done yet. Please advise if views are needed for this appt. Thanks.     Patient can be reached at [phone]

## 2022-09-08 ENCOUNTER — HOSPITAL ENCOUNTER (OUTPATIENT)
Age: 57
Discharge: HOME | End: 2022-09-08
Payer: MEDICAID

## 2022-09-08 VITALS
OXYGEN SATURATION: 98 % | TEMPERATURE: 98 F | BODY MASS INDEX: 27.46 KG/M2 | RESPIRATION RATE: 16 BRPM | WEIGHT: 155 LBS | DIASTOLIC BLOOD PRESSURE: 80 MMHG | HEART RATE: 75 BPM | HEIGHT: 63 IN | SYSTOLIC BLOOD PRESSURE: 137 MMHG

## 2022-09-08 DIAGNOSIS — H43.391 FLOATERS IN VISUAL FIELD, RIGHT: Primary | ICD-10-CM

## 2022-09-08 PROCEDURE — 99212 OFFICE O/P EST SF 10 MIN: CPT

## 2022-09-09 NOTE — CM/SW NOTE
Tried contacting patient about ophthalmology follow up since patient has [facility] medicaid. No answer. Left number for a call back.

## 2022-09-09 NOTE — CM/SW NOTE
Spoke to patient and provided her with [address] [place] [address] Ophthalmology for follow up since they do accept [facility] [phone]. Pt will give them a call to set up appointment.

## 2022-09-11 ENCOUNTER — HOSPITAL ENCOUNTER (EMERGENCY)
Age: 57
Discharge: HOME | End: 2022-09-11
Attending: STUDENT IN AN ORGANIZED HEALTH CARE EDUCATION/TRAINING PROGRAM
Payer: MEDICAID

## 2022-09-11 ENCOUNTER — APPOINTMENT (OUTPATIENT)
Dept: CT IMAGING | Age: 57
End: 2022-09-11
Attending: NURSE PRACTITIONER
Payer: MEDICAID

## 2022-09-11 VITALS
DIASTOLIC BLOOD PRESSURE: 81 MMHG | SYSTOLIC BLOOD PRESSURE: 145 MMHG | OXYGEN SATURATION: 98 % | TEMPERATURE: 98 F | HEART RATE: 65 BPM | RESPIRATION RATE: 16 BRPM

## 2022-09-11 DIAGNOSIS — K57.92 ACUTE DIVERTICULITIS: Primary | ICD-10-CM

## 2022-09-11 LAB
ALBUMIN SERPL-MCNC: 3.5 G/DL (ref 3.4–5)
ALBUMIN/GLOB SERPL: 1.2 {RATIO} (ref 1–2)
ALP LIVER SERPL-CCNC: 79 U/L
ALT SERPL-CCNC: 10 U/L
ANION GAP SERPL CALC-SCNC: 4 MMOL/L (ref 0–18)
AST SERPL-CCNC: 8 U/L (ref 15–37)
BASOPHILS # BLD AUTO: 0.04 X10(3) UL (ref 0–0.2)
BASOPHILS NFR BLD AUTO: 0.4 %
BILIRUB SERPL-MCNC: 0.4 MG/DL (ref 0.1–2)
BILIRUB UR QL STRIP.AUTO: NEGATIVE
BUN BLD-MCNC: 9 MG/DL (ref 7–18)
CALCIUM BLD-MCNC: 8.8 MG/DL (ref 8.5–10.1)
CHLORIDE SERPL-SCNC: 107 MMOL/L (ref 98–112)
CLARITY UR REFRACT.AUTO: CLEAR
CO2 SERPL-SCNC: 27 MMOL/L (ref 21–32)
COLOR UR AUTO: YELLOW
CREAT BLD-MCNC: 0.66 MG/DL
EOSINOPHIL # BLD AUTO: 0.08 X10(3) UL (ref 0–0.7)
EOSINOPHIL NFR BLD AUTO: 0.7 %
ERYTHROCYTE [DISTWIDTH] IN BLOOD BY AUTOMATED COUNT: 12.8 %
GFR SERPLBLD BASED ON 1.73 SQ M-ARVRAT: 104 ML/MIN/1.73M2 (ref 60–?)
GLOBULIN PLAS-MCNC: 3 G/DL (ref 2.8–4.4)
GLUCOSE BLD-MCNC: 96 MG/DL (ref 70–99)
GLUCOSE UR STRIP.AUTO-MCNC: NEGATIVE MG/DL
HCT VFR BLD AUTO: 41.1 %
HGB BLD-MCNC: 14 G/DL
IMM GRANULOCYTES # BLD AUTO: 0.04 X10(3) UL (ref 0–1)
IMM GRANULOCYTES NFR BLD: 0.4 %
KETONES UR STRIP.AUTO-MCNC: NEGATIVE MG/DL
LIPASE SERPL-CCNC: 139 U/L (ref 73–393)
LYMPHOCYTES # BLD AUTO: 1.39 X10(3) UL (ref 1–4)
LYMPHOCYTES NFR BLD AUTO: 12.8 %
MCH RBC QN AUTO: 31.4 PG (ref 26–34)
MCHC RBC AUTO-ENTMCNC: 34.1 G/DL (ref 31–37)
MCV RBC AUTO: 92.2 FL
MONOCYTES # BLD AUTO: 0.86 X10(3) UL (ref 0.1–1)
MONOCYTES NFR BLD AUTO: 7.9 %
NEUTROPHILS # BLD AUTO: 8.47 X10 (3) UL (ref 1.5–7.7)
NEUTROPHILS # BLD AUTO: 8.47 X10(3) UL (ref 1.5–7.7)
NEUTROPHILS NFR BLD AUTO: 77.8 %
NITRITE UR QL STRIP.AUTO: NEGATIVE
OSMOLALITY SERPL CALC.SUM OF ELEC: 285 MOSM/KG (ref 275–295)
PH UR STRIP.AUTO: 7 [PH] (ref 5–8)
PLATELET # BLD AUTO: 249 10(3)UL (ref 150–450)
POTASSIUM SERPL-SCNC: 3.9 MMOL/L (ref 3.5–5.1)
PROT SERPL-MCNC: 6.5 G/DL (ref 6.4–8.2)
PROT UR STRIP.AUTO-MCNC: NEGATIVE MG/DL
RBC # BLD AUTO: 4.46 X10(6)UL
SODIUM SERPL-SCNC: 138 MMOL/L (ref 136–145)
SP GR UR STRIP.AUTO: 1.02 (ref 1–1.03)
UROBILINOGEN UR STRIP.AUTO-MCNC: 0.2 MG/DL
WBC # BLD AUTO: 10.9 X10(3) UL (ref 4–11)

## 2022-09-11 PROCEDURE — 83690 ASSAY OF LIPASE: CPT | Performed by: NURSE PRACTITIONER

## 2022-09-11 PROCEDURE — 81015 MICROSCOPIC EXAM OF URINE: CPT | Performed by: NURSE PRACTITIONER

## 2022-09-11 PROCEDURE — 80053 COMPREHEN METABOLIC PANEL: CPT | Performed by: NURSE PRACTITIONER

## 2022-09-11 PROCEDURE — 99284 EMERGENCY DEPT VISIT MOD MDM: CPT | Performed by: STUDENT IN AN ORGANIZED HEALTH CARE EDUCATION/TRAINING PROGRAM

## 2022-09-11 PROCEDURE — 81001 URINALYSIS AUTO W/SCOPE: CPT | Performed by: NURSE PRACTITIONER

## 2022-09-11 PROCEDURE — 87086 URINE CULTURE/COLONY COUNT: CPT | Performed by: NURSE PRACTITIONER

## 2022-09-11 PROCEDURE — 96375 TX/PRO/DX INJ NEW DRUG ADDON: CPT | Performed by: STUDENT IN AN ORGANIZED HEALTH CARE EDUCATION/TRAINING PROGRAM

## 2022-09-11 PROCEDURE — 96374 THER/PROPH/DIAG INJ IV PUSH: CPT | Performed by: STUDENT IN AN ORGANIZED HEALTH CARE EDUCATION/TRAINING PROGRAM

## 2022-09-11 PROCEDURE — 74177 CT ABD & PELVIS W/CONTRAST: CPT | Performed by: NURSE PRACTITIONER

## 2022-09-11 PROCEDURE — 85025 COMPLETE CBC W/AUTO DIFF WBC: CPT | Performed by: NURSE PRACTITIONER

## 2022-09-11 RX ORDER — DICYCLOMINE HCL 20 MG
20 TABLET ORAL 4 TIMES DAILY PRN
Qty: 30 TABLET | Refills: 0 | Status: SHIPPED | OUTPATIENT
Start: 2022-09-11 | End: 2022-10-11

## 2022-09-11 RX ORDER — AMOXICILLIN AND CLAVULANATE POTASSIUM 875; 125 MG/1; MG/1
1 TABLET, FILM COATED ORAL 2 TIMES DAILY
Qty: 20 TABLET | Refills: 0 | Status: SHIPPED | OUTPATIENT
Start: 2022-09-11 | End: 2022-09-13

## 2022-09-11 RX ORDER — HYDROCODONE BITARTRATE AND ACETAMINOPHEN 5; 325 MG/1; MG/1
1 TABLET ORAL EVERY 6 HOURS PRN
Qty: 10 TABLET | Refills: 0 | Status: SHIPPED | OUTPATIENT
Start: 2022-09-11 | End: 2022-09-13

## 2022-09-11 RX ORDER — ONDANSETRON 2 MG/ML
4 INJECTION INTRAMUSCULAR; INTRAVENOUS ONCE
Status: COMPLETED | OUTPATIENT
Start: 2022-09-11 | End: 2022-09-11

## 2022-09-11 RX ORDER — MORPHINE SULFATE 4 MG/ML
4 INJECTION, SOLUTION INTRAMUSCULAR; INTRAVENOUS ONCE
Status: COMPLETED | OUTPATIENT
Start: 2022-09-11 | End: 2022-09-11

## 2022-09-11 NOTE — ED INITIAL ASSESSMENT (HPI)
Pt states she has had left abd pain for the last few days always has diarrhea that's not new, no vomiting no fever.

## 2022-09-12 ENCOUNTER — TELEPHONE (OUTPATIENT)
Dept: INTERNAL MEDICINE CLINIC | Facility: CLINIC | Age: 57
End: 2022-09-12

## 2022-09-12 NOTE — TELEPHONE ENCOUNTER
Pt seen in ED yesterday   States that Amoxicillin will not work for her that she was given. Pt persistent that she wants a different rx 'right now' without visit. She stated it would be for an intestinal infection and she doesn't want to go back to the hospital.   Pt has appointment tomorrow but does not want to wait.      Pt requesting Metronidazole 500 MG

## 2022-09-12 NOTE — TELEPHONE ENCOUNTER
Relayed SV recommendations-verbalized understanding. Stated she would further discuss with SV during tomorrow's appt.

## 2022-09-12 NOTE — TELEPHONE ENCOUNTER
Per new guidelines for uncomplicated diverticulitis (no abscess or perforation) we do not need antibiotics however if she has already gotten it she should continue augmentin.  We do not give augmentin with metronidazole and metronidazole alone is not the right medication for this (generally given with ciprofloxacin)    Anh Garcia DO

## 2022-09-13 ENCOUNTER — OFFICE VISIT (OUTPATIENT)
Dept: INTERNAL MEDICINE CLINIC | Facility: CLINIC | Age: 57
End: 2022-09-13
Payer: MEDICAID

## 2022-09-13 VITALS
OXYGEN SATURATION: 93 % | BODY MASS INDEX: 28.35 KG/M2 | SYSTOLIC BLOOD PRESSURE: 134 MMHG | TEMPERATURE: 98 F | DIASTOLIC BLOOD PRESSURE: 60 MMHG | HEIGHT: 63 IN | RESPIRATION RATE: 16 BRPM | HEART RATE: 69 BPM | WEIGHT: 160 LBS

## 2022-09-13 DIAGNOSIS — K57.92 ACUTE DIVERTICULITIS: Primary | ICD-10-CM

## 2022-09-13 DIAGNOSIS — F41.9 ANXIETY: ICD-10-CM

## 2022-09-13 DIAGNOSIS — Z12.31 ENCOUNTER FOR SCREENING MAMMOGRAM FOR MALIGNANT NEOPLASM OF BREAST: ICD-10-CM

## 2022-09-13 DIAGNOSIS — R56.9 SEIZURE (HCC): ICD-10-CM

## 2022-09-13 DIAGNOSIS — N89.8 VAGINAL DISCHARGE: ICD-10-CM

## 2022-09-13 DIAGNOSIS — E66.3 OVERWEIGHT (BMI 25.0-29.9): ICD-10-CM

## 2022-09-13 DIAGNOSIS — K50.10 CROHN'S DISEASE OF COLON WITHOUT COMPLICATION (HCC): ICD-10-CM

## 2022-09-13 PROBLEM — R42 DIZZINESS: Status: RESOLVED | Noted: 2021-10-11 | Resolved: 2022-09-13

## 2022-09-13 PROBLEM — R51.9 INTRACTABLE EPISODIC HEADACHE: Status: RESOLVED | Noted: 2021-10-11 | Resolved: 2022-09-13
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
## 2022-09-13 PROBLEM — Z91.51 HISTORY OF SUICIDE ATTEMPT: Status: RESOLVED | Noted: 2021-10-26 | Resolved: 2022-09-13

## 2022-09-13 PROCEDURE — 3078F DIAST BP <80 MM HG: CPT | Performed by: INTERNAL MEDICINE

## 2022-09-13 PROCEDURE — 87510 GARDNER VAG DNA DIR PROBE: CPT | Performed by: INTERNAL MEDICINE

## 2022-09-13 PROCEDURE — 87480 CANDIDA DNA DIR PROBE: CPT | Performed by: INTERNAL MEDICINE

## 2022-09-13 PROCEDURE — 3008F BODY MASS INDEX DOCD: CPT | Performed by: INTERNAL MEDICINE

## 2022-09-13 PROCEDURE — 87591 N.GONORRHOEAE DNA AMP PROB: CPT | Performed by: INTERNAL MEDICINE

## 2022-09-13 PROCEDURE — 99214 OFFICE O/P EST MOD 30 MIN: CPT | Performed by: INTERNAL MEDICINE

## 2022-09-13 PROCEDURE — 87491 CHLMYD TRACH DNA AMP PROBE: CPT | Performed by: INTERNAL MEDICINE

## 2022-09-13 PROCEDURE — 3075F SYST BP GE 130 - 139MM HG: CPT | Performed by: INTERNAL MEDICINE

## 2022-09-13 PROCEDURE — 87660 TRICHOMONAS VAGIN DIR PROBE: CPT | Performed by: INTERNAL MEDICINE

## 2022-09-13 RX ORDER — METRONIDAZOLE 500 MG/1
500 TABLET ORAL 3 TIMES DAILY
Qty: 24 TABLET | Refills: 0 | Status: SHIPPED | OUTPATIENT
Start: 2022-09-13 | End: 2022-09-21

## 2022-09-13 RX ORDER — CIPROFLOXACIN 500 MG/1
500 TABLET, FILM COATED ORAL 2 TIMES DAILY
Qty: 16 TABLET | Refills: 0 | Status: SHIPPED | OUTPATIENT
Start: 2022-09-13 | End: 2022-09-21

## 2022-09-13 RX ORDER — ALPRAZOLAM 0.25 MG/1
0.25 TABLET ORAL NIGHTLY PRN
Qty: 20 TABLET | Refills: 0 | Status: SHIPPED | OUTPATIENT
Start: 2022-09-13

## 2022-09-13 NOTE — PATIENT INSTRUCTIONS
Stop the augmentin, start ciprofloxacin with metronidazole for 8 days. Please keep a liquid to soft diet until your symptoms improve    Please call the GI doctors I have placed a referral     I will let you know what the test results show    Please see me every 6 months, but sooner if any worsening symptoms    You are due for a mammogram in December.  Please schedule that appointment

## 2022-09-14 LAB
C TRACH DNA SPEC QL NAA+PROBE: NEGATIVE
N GONORRHOEA DNA SPEC QL NAA+PROBE: NEGATIVE

## 2022-09-20 ENCOUNTER — APPOINTMENT (OUTPATIENT)
Dept: GENERAL RADIOLOGY | Facility: HOSPITAL | Age: 57
End: 2022-09-20
Attending: EMERGENCY MEDICINE

## 2022-09-20 ENCOUNTER — APPOINTMENT (OUTPATIENT)
Dept: CT IMAGING | Facility: HOSPITAL | Age: 57
End: 2022-09-20
Attending: EMERGENCY MEDICINE

## 2022-09-20 ENCOUNTER — TELEPHONE (OUTPATIENT)
Dept: INTERNAL MEDICINE CLINIC | Facility: CLINIC | Age: 57
End: 2022-09-20

## 2022-09-20 ENCOUNTER — HOSPITAL ENCOUNTER (EMERGENCY)
Facility: HOSPITAL | Age: 57
Discharge: HOME | End: 2022-09-20
Attending: EMERGENCY MEDICINE

## 2022-09-20 VITALS
OXYGEN SATURATION: 97 % | SYSTOLIC BLOOD PRESSURE: 136 MMHG | TEMPERATURE: 99 F | DIASTOLIC BLOOD PRESSURE: 95 MMHG | HEART RATE: 76 BPM | RESPIRATION RATE: 20 BRPM

## 2022-09-20 DIAGNOSIS — R42 DIZZINESS: Primary | ICD-10-CM

## 2022-09-20 DIAGNOSIS — R07.9 CHEST PAIN OF UNCERTAIN ETIOLOGY: ICD-10-CM

## 2022-09-20 LAB
ALBUMIN SERPL-MCNC: 3.4 G/DL (ref 3.4–5)
ALBUMIN/GLOB SERPL: 1.3 {RATIO} (ref 1–2)
ALP LIVER SERPL-CCNC: 74 U/L
ALT SERPL-CCNC: 21 U/L
ANION GAP SERPL CALC-SCNC: 2 MMOL/L (ref 0–18)
AST SERPL-CCNC: 13 U/L (ref 15–37)
BASOPHILS # BLD AUTO: 0.02 X10(3) UL (ref 0–0.2)
BASOPHILS NFR BLD AUTO: 0.5 %
BILIRUB SERPL-MCNC: 0.2 MG/DL (ref 0.1–2)
BUN BLD-MCNC: 9 MG/DL (ref 7–18)
CALCIUM BLD-MCNC: 8.3 MG/DL (ref 8.5–10.1)
CHLORIDE SERPL-SCNC: 111 MMOL/L (ref 98–112)
CO2 SERPL-SCNC: 27 MMOL/L (ref 21–32)
CREAT BLD-MCNC: 0.77 MG/DL
D DIMER PPP FEU-MCNC: 0.53 UG/ML FEU (ref ?–0.55)
EOSINOPHIL # BLD AUTO: 0.08 X10(3) UL (ref 0–0.7)
EOSINOPHIL NFR BLD AUTO: 1.8 %
ERYTHROCYTE [DISTWIDTH] IN BLOOD BY AUTOMATED COUNT: 13 %
GFR SERPLBLD BASED ON 1.73 SQ M-ARVRAT: 91 ML/MIN/1.73M2 (ref 60–?)
GLOBULIN PLAS-MCNC: 2.6 G/DL (ref 2.8–4.4)
GLUCOSE BLD-MCNC: 126 MG/DL (ref 70–99)
HCT VFR BLD AUTO: 40.3 %
HGB BLD-MCNC: 13.2 G/DL
IMM GRANULOCYTES # BLD AUTO: 0.03 X10(3) UL (ref 0–1)
IMM GRANULOCYTES NFR BLD: 0.7 %
LYMPHOCYTES # BLD AUTO: 1.41 X10(3) UL (ref 1–4)
LYMPHOCYTES NFR BLD AUTO: 31.9 %
MCH RBC QN AUTO: 31.4 PG (ref 26–34)
MCHC RBC AUTO-ENTMCNC: 32.8 G/DL (ref 31–37)
MCV RBC AUTO: 95.7 FL
MONOCYTES # BLD AUTO: 0.87 X10(3) UL (ref 0.1–1)
MONOCYTES NFR BLD AUTO: 19.7 %
NEUTROPHILS # BLD AUTO: 2.01 X10 (3) UL (ref 1.5–7.7)
NEUTROPHILS # BLD AUTO: 2.01 X10(3) UL (ref 1.5–7.7)
NEUTROPHILS NFR BLD AUTO: 45.4 %
OSMOLALITY SERPL CALC.SUM OF ELEC: 290 MOSM/KG (ref 275–295)
PLATELET # BLD AUTO: 226 10(3)UL (ref 150–450)
POTASSIUM SERPL-SCNC: 3.6 MMOL/L (ref 3.5–5.1)
PROT SERPL-MCNC: 6 G/DL (ref 6.4–8.2)
RBC # BLD AUTO: 4.21 X10(6)UL
SODIUM SERPL-SCNC: 140 MMOL/L (ref 136–145)
TROPONIN I HIGH SENSITIVITY: 4 NG/L
TROPONIN I HIGH SENSITIVITY: 5 NG/L
WBC # BLD AUTO: 4.4 X10(3) UL (ref 4–11)

## 2022-09-20 PROCEDURE — 80053 COMPREHEN METABOLIC PANEL: CPT | Performed by: EMERGENCY MEDICINE

## 2022-09-20 PROCEDURE — 99285 EMERGENCY DEPT VISIT HI MDM: CPT

## 2022-09-20 PROCEDURE — 93005 ELECTROCARDIOGRAM TRACING: CPT

## 2022-09-20 PROCEDURE — 70450 CT HEAD/BRAIN W/O DYE: CPT | Performed by: EMERGENCY MEDICINE

## 2022-09-20 PROCEDURE — 36415 COLL VENOUS BLD VENIPUNCTURE: CPT

## 2022-09-20 PROCEDURE — 85379 FIBRIN DEGRADATION QUANT: CPT | Performed by: EMERGENCY MEDICINE

## 2022-09-20 PROCEDURE — 84484 ASSAY OF TROPONIN QUANT: CPT | Performed by: EMERGENCY MEDICINE

## 2022-09-20 PROCEDURE — 85025 COMPLETE CBC W/AUTO DIFF WBC: CPT | Performed by: EMERGENCY MEDICINE

## 2022-09-20 PROCEDURE — 93010 ELECTROCARDIOGRAM REPORT: CPT

## 2022-09-20 PROCEDURE — 71045 X-RAY EXAM CHEST 1 VIEW: CPT | Performed by: EMERGENCY MEDICINE

## 2022-09-20 RX ORDER — IBUPROFEN 600 MG/1
600 TABLET ORAL ONCE
Status: COMPLETED | OUTPATIENT
Start: 2022-09-20 | End: 2022-09-20

## 2022-09-20 RX ORDER — ALBUTEROL SULFATE 90 UG/1
2 AEROSOL, METERED RESPIRATORY (INHALATION) EVERY 4 HOURS PRN
Qty: 1 EACH | Refills: 0 | Status: SHIPPED | OUTPATIENT
Start: 2022-09-20 | End: 2022-10-20

## 2022-09-20 RX ORDER — MECLIZINE HYDROCHLORIDE 25 MG/1
25 TABLET ORAL 3 TIMES DAILY PRN
Qty: 30 TABLET | Refills: 0 | Status: SHIPPED | OUTPATIENT
Start: 2022-09-20

## 2022-09-20 RX ORDER — ASPIRIN 81 MG/1
324 TABLET, CHEWABLE ORAL ONCE
Status: COMPLETED | OUTPATIENT
Start: 2022-09-20 | End: 2022-09-20

## 2022-09-20 NOTE — TELEPHONE ENCOUNTER
Received call from patient, pt complaining of SOB and chest pain. States she tested positive for COVID 1 week ago and s/s not improving. Advised patient she should seek care immediately in ED due to CP/SOB, worsening symptoms. Verbalized understanding.

## 2022-09-20 NOTE — ED INITIAL ASSESSMENT (HPI)
Patient to the ER c/o Covid. Patient took home test last night. Patient states she has not felt well for two weeks. Patient reports diarrhea, productive cough and wheezing.

## 2022-09-21 LAB
ATRIAL RATE: 69 BPM
P AXIS: 49 DEGREES
P-R INTERVAL: 164 MS
Q-T INTERVAL: 438 MS
QRS DURATION: 86 MS
QTC CALCULATION (BEZET): 469 MS
R AXIS: 67 DEGREES
T AXIS: 33 DEGREES
VENTRICULAR RATE: 69 BPM

## 2022-09-28 ENCOUNTER — HOSPITAL ENCOUNTER (OUTPATIENT)
Dept: GENERAL RADIOLOGY | Age: 57
Discharge: HOME | End: 2022-09-28
Attending: PODIATRIST
Payer: MEDICAID

## 2022-09-28 ENCOUNTER — OFFICE VISIT (OUTPATIENT)
Dept: ORTHOPEDICS CLINIC | Facility: CLINIC | Age: 57
End: 2022-09-28

## 2022-09-28 VITALS — WEIGHT: 150 LBS | BODY MASS INDEX: 26.25 KG/M2 | HEIGHT: 63.5 IN

## 2022-09-28 DIAGNOSIS — M79.672 LEFT FOOT PAIN: ICD-10-CM

## 2022-09-28 DIAGNOSIS — R20.0 NUMBNESS OF TOES: ICD-10-CM

## 2022-09-28 DIAGNOSIS — M20.41 HAMMER TOE OF RIGHT FOOT: ICD-10-CM

## 2022-09-28 DIAGNOSIS — F17.200 CURRENT EVERY DAY SMOKER: ICD-10-CM

## 2022-09-28 DIAGNOSIS — M21.619 BUNION: ICD-10-CM

## 2022-09-28 DIAGNOSIS — I73.00 RAYNAUD'S PHENOMENON WITHOUT GANGRENE: ICD-10-CM

## 2022-09-28 DIAGNOSIS — M24.80 GENERALIZED HYPERMOBILITY OF JOINTS: ICD-10-CM

## 2022-09-28 DIAGNOSIS — M20.5X1 CROSSOVER TOE DEFORMITY OF RIGHT FOOT: ICD-10-CM

## 2022-09-28 DIAGNOSIS — M79.671 RIGHT FOOT PAIN: ICD-10-CM

## 2022-09-28 DIAGNOSIS — M77.50 CAPSULITIS OF METATARSOPHALANGEAL (MTP) JOINT: ICD-10-CM

## 2022-09-28 DIAGNOSIS — M79.672 LEFT FOOT PAIN: Primary | ICD-10-CM

## 2022-09-28 PROCEDURE — 73630 X-RAY EXAM OF FOOT: CPT | Performed by: PODIATRIST

## 2022-09-28 PROCEDURE — 99204 OFFICE O/P NEW MOD 45 MIN: CPT | Performed by: PODIATRIST

## 2022-09-28 PROCEDURE — 3008F BODY MASS INDEX DOCD: CPT | Performed by: PODIATRIST

## 2022-09-29 ENCOUNTER — TELEPHONE (OUTPATIENT)
Dept: ORTHOPEDICS CLINIC | Facility: CLINIC | Age: 57
End: 2022-09-29

## 2022-09-29 NOTE — TELEPHONE ENCOUNTER
----- Message from [place].  Jo Ann Davis DPM sent at 9/28/2022  2:23 PM CDT -----  Surgery in Oct or Nov  Info in my note today  Thanks  YURY

## 2022-10-07 NOTE — TELEPHONE ENCOUNTER
Spoke with patient who stated she was hoping to receive a call regarding scheduling surgery, as she has not heard anything yet. Can you please reach out to patient for scheduling? Thank you.

## 2022-10-24 ENCOUNTER — TELEPHONE (OUTPATIENT)
Dept: NEUROLOGY | Facility: CLINIC | Age: 57
End: 2022-10-24

## 2022-10-24 NOTE — TELEPHONE ENCOUNTER
Dr. Mona Lennox from [facility] epilepsy  calling to speak with Dr. Espinoza Harrell regarding Pts EEG results and overall impression of pt. Please call  On cell at your convenience at [phone].

## 2022-11-01 ENCOUNTER — TELEPHONE (OUTPATIENT)
Dept: INTERNAL MEDICINE CLINIC | Facility: CLINIC | Age: 57
End: 2022-11-01

## 2022-11-01 DIAGNOSIS — E53.8 VITAMIN B12 DEFICIENCY: Primary | ICD-10-CM

## 2022-11-01 PROBLEM — G44.89 OTHER HEADACHE SYNDROME: Status: ACTIVE | Noted: 2021-10-11

## 2022-11-01 PROBLEM — G47.10 EXCESSIVE SLEEPINESS: Status: ACTIVE | Noted: 2022-11-01

## 2022-11-01 RX ORDER — CYANOCOBALAMIN 1000 UG/ML
1000 INJECTION, SOLUTION INTRAMUSCULAR; SUBCUTANEOUS ONCE
Status: DISCONTINUED | OUTPATIENT
Start: 2022-11-01 | End: 2022-11-01

## 2022-11-01 NOTE — TELEPHONE ENCOUNTER
Can we find out which neurologist. I reviewed the notes from the neurologist at [facility] and he did not recommend it.  Did she have a blood test recently    [address] [place] [address] DO

## 2022-11-01 NOTE — TELEPHONE ENCOUNTER
Future Appointments   Date Time Provider [place]   11/7/2022 10:00 AM [facility] 08 NURSE [facility] 8 [facility] [place]     Pt scheduled B12 shot NV. Pt stated she was advised by neurologist to get these shots. Place orders if appropriate.

## 2022-11-02 RX ORDER — CYANOCOBALAMIN 1000 UG/ML
1000 INJECTION, SOLUTION INTRAMUSCULAR; SUBCUTANEOUS
Status: SHIPPED | OUTPATIENT
Start: 2022-11-02

## 2022-11-02 NOTE — TELEPHONE ENCOUNTER
Vitamin B12 shots ordered, to be done monthly.  If she cannot come in monthly then she can try over the counter vitamin B12 supplement 1000 mcg daily  [facility] DO

## 2022-11-07 ENCOUNTER — NURSE ONLY (OUTPATIENT)
Dept: INTERNAL MEDICINE CLINIC | Facility: CLINIC | Age: 57
End: 2022-11-07
Payer: MEDICAID

## 2022-11-07 RX ADMIN — CYANOCOBALAMIN 1000 MCG: 1000 INJECTION, SOLUTION INTRAMUSCULAR; SUBCUTANEOUS at 09:58:00

## 2022-11-08 ENCOUNTER — OFFICE VISIT (OUTPATIENT)
Dept: INTERNAL MEDICINE CLINIC | Facility: CLINIC | Age: 57
End: 2022-11-08
Payer: MEDICAID

## 2022-11-08 VITALS
WEIGHT: 162.63 LBS | OXYGEN SATURATION: 99 % | RESPIRATION RATE: 12 BRPM | HEIGHT: 63.13 IN | SYSTOLIC BLOOD PRESSURE: 112 MMHG | HEART RATE: 67 BPM | DIASTOLIC BLOOD PRESSURE: 76 MMHG | TEMPERATURE: 99 F | BODY MASS INDEX: 28.82 KG/M2

## 2022-11-08 DIAGNOSIS — K50.10 CROHN'S DISEASE OF COLON WITHOUT COMPLICATION (HCC): ICD-10-CM

## 2022-11-08 DIAGNOSIS — G44.89 OTHER HEADACHE SYNDROME: ICD-10-CM

## 2022-11-08 DIAGNOSIS — F41.9 ANXIETY: ICD-10-CM
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
## 2022-11-08 DIAGNOSIS — G47.10 EXCESSIVE SLEEPINESS: ICD-10-CM

## 2022-11-08 PROCEDURE — 90471 IMMUNIZATION ADMIN: CPT | Performed by: INTERNAL MEDICINE

## 2022-11-08 PROCEDURE — 90686 IIV4 VACC NO PRSV 0.5 ML IM: CPT | Performed by: INTERNAL MEDICINE

## 2022-11-08 PROCEDURE — 99214 OFFICE O/P EST MOD 30 MIN: CPT | Performed by: INTERNAL MEDICINE

## 2022-11-08 PROCEDURE — 3078F DIAST BP <80 MM HG: CPT | Performed by: INTERNAL MEDICINE

## 2022-11-08 PROCEDURE — 3074F SYST BP LT 130 MM HG: CPT | Performed by: INTERNAL MEDICINE

## 2022-11-08 PROCEDURE — 3008F BODY MASS INDEX DOCD: CPT | Performed by: INTERNAL MEDICINE

## 2022-11-08 RX ORDER — PANTOPRAZOLE SODIUM 40 MG/1
40 TABLET, DELAYED RELEASE ORAL
COMMUNITY
Start: 2022-11-03

## 2022-11-08 RX ORDER — DICYCLOMINE HYDROCHLORIDE 10 MG/1
10 CAPSULE ORAL
COMMUNITY
Start: 2022-11-03

## 2022-11-08 NOTE — PATIENT INSTRUCTIONS
I have ordered a CT scan/MRI of the neck, mid and low back    Please keep your appointment with the neurologist on Monday November 14th at 11:40    Keep that appointment to see what he recommends    Schedule your imaging

## 2022-11-14 ENCOUNTER — OFFICE VISIT (OUTPATIENT)
Dept: NEUROLOGY | Facility: CLINIC | Age: 57
End: 2022-11-14
Payer: MEDICAID

## 2022-11-14 VITALS — RESPIRATION RATE: 16 BRPM | DIASTOLIC BLOOD PRESSURE: 78 MMHG | HEART RATE: 72 BPM | SYSTOLIC BLOOD PRESSURE: 134 MMHG

## 2022-11-14 DIAGNOSIS — R51.9 INTRACTABLE EPISODIC HEADACHE, UNSPECIFIED HEADACHE TYPE: ICD-10-CM

## 2022-11-14 DIAGNOSIS — G40.909 SEIZURE DISORDER (HCC): ICD-10-CM

## 2022-11-14 RX ORDER — LEVETIRACETAM 500 MG/1
500 TABLET ORAL 2 TIMES DAILY
Qty: 270 TABLET | Refills: 3 | Status: SHIPPED | OUTPATIENT
Start: 2022-11-14

## 2022-11-14 RX ORDER — SUMATRIPTAN 50 MG/1
TABLET, FILM COATED ORAL
Qty: 9 TABLET | Refills: 3 | Status: SHIPPED | OUTPATIENT
Start: 2022-11-14

## 2022-11-14 NOTE — PROGRESS NOTES
Patient here to follow up regarding dizziness. Symptoms have been worse since last visit with increased falls.  Noticed increased severity approx 10/2022

## 2022-11-21 ENCOUNTER — HOSPITAL ENCOUNTER (EMERGENCY)
Facility: HOSPITAL | Age: 57
Discharge: HOME | End: 2022-11-21
Attending: EMERGENCY MEDICINE
Payer: MEDICAID

## 2022-11-21 ENCOUNTER — APPOINTMENT (OUTPATIENT)
Dept: GENERAL RADIOLOGY | Facility: HOSPITAL | Age: 57
End: 2022-11-21
Attending: EMERGENCY MEDICINE
Payer: MEDICAID

## 2022-11-21 VITALS
BODY MASS INDEX: 27.31 KG/M2 | OXYGEN SATURATION: 96 % | TEMPERATURE: 102 F | SYSTOLIC BLOOD PRESSURE: 113 MMHG | DIASTOLIC BLOOD PRESSURE: 69 MMHG | WEIGHT: 160 LBS | HEART RATE: 92 BPM | RESPIRATION RATE: 20 BRPM | HEIGHT: 64 IN

## 2022-11-21 DIAGNOSIS — J01.90 ACUTE NON-RECURRENT SINUSITIS, UNSPECIFIED LOCATION: Primary | ICD-10-CM

## 2022-11-21 LAB
FLUAV + FLUBV RNA SPEC NAA+PROBE: NEGATIVE
FLUAV + FLUBV RNA SPEC NAA+PROBE: NEGATIVE
RSV RNA SPEC NAA+PROBE: NEGATIVE
SARS-COV-2 RNA RESP QL NAA+PROBE: NOT DETECTED

## 2022-11-21 PROCEDURE — 99283 EMERGENCY DEPT VISIT LOW MDM: CPT

## 2022-11-21 PROCEDURE — 99284 EMERGENCY DEPT VISIT MOD MDM: CPT

## 2022-11-21 PROCEDURE — 71045 X-RAY EXAM CHEST 1 VIEW: CPT | Performed by: EMERGENCY MEDICINE

## 2022-11-21 PROCEDURE — 0241U SARS-COV-2/FLU A AND B/RSV BY PCR (GENEXPERT): CPT | Performed by: EMERGENCY MEDICINE

## 2022-11-21 RX ORDER — AZITHROMYCIN 250 MG/1
TABLET, FILM COATED ORAL
Qty: 6 TABLET | Refills: 0 | Status: SHIPPED | OUTPATIENT
Start: 2022-11-21 | End: 2022-11-26

## 2022-11-21 RX ORDER — ACETAMINOPHEN 500 MG
1000 TABLET ORAL ONCE
Status: COMPLETED | OUTPATIENT
Start: 2022-11-21 | End: 2022-11-21

## 2022-11-22 RX ORDER — ESCITALOPRAM OXALATE 10 MG/1
10 TABLET ORAL DAILY
Qty: 90 TABLET | Refills: 0 | Status: SHIPPED | OUTPATIENT
Start: 2022-11-22

## 2022-11-25 ENCOUNTER — TELEPHONE (OUTPATIENT)
Dept: INTERNAL MEDICINE CLINIC | Facility: CLINIC | Age: 57
End: 2022-11-25

## 2022-11-25 NOTE — TELEPHONE ENCOUNTER
SV - pt has previously had Rx for albuterol MDI.  Requesting it be sent to:    [facility]/pharmacy [address], [address] [facility] [address], [address] [place] [address]   Phone: [phone] Fax: [phone]

## 2022-11-25 NOTE — TELEPHONE ENCOUNTER
Pt was seen at [place] [address] ER on 11/21 for upper resp infection. Pt has 1 pill left on her zpak and stated she does not think it helped. Pt states she doesn't feel any better and feels worse. Pt is requesting any type of medication, especially and inhaler. Pt also stated she does not want to go back to the ER since she feels they were not able to help her. Advised that office is now closed but will send urgent message. Pt verbalized understanding and stated that any medication to be sent to [facility] in [place].      Confirmed [phone]

## 2022-11-28 RX ORDER — ALBUTEROL SULFATE 90 UG/1
2 AEROSOL, METERED RESPIRATORY (INHALATION) EVERY 4 HOURS PRN
Qty: 1 EACH | Refills: 0 | Status: SHIPPED | OUTPATIENT
Start: 2022-11-28 | End: 2022-12-28

## 2022-12-01 ENCOUNTER — TELEPHONE (OUTPATIENT)
Dept: ADMINISTRATIVE | Facility: HOSPITAL | Age: 57
End: 2022-12-01

## 2022-12-01 ENCOUNTER — TELEPHONE (OUTPATIENT)
Dept: ORTHOPEDICS CLINIC | Facility: CLINIC | Age: 57
End: 2022-12-01

## 2022-12-01 NOTE — TELEPHONE ENCOUNTER
Good Morning. Please be advise that the MRI CERVICAL+THORACIC+LUMBAR SPINE (ALL W+WO) (CPT=72156/08495/37134) was Denied by the Patient Health Plan. According to [facility] Dr. Debby Mejia can call and initiate a Peer to Peer to be done to see if the Denial can be overturned. OFOS#[id number] Tel# [phone] Option#4 Patient is schedule on 12/03/2022.       Thanks,  [employer]

## 2022-12-01 NOTE — TELEPHONE ENCOUNTER
Please let the patient know that the MRI was denied. She will need to follow up with her neurologist and see what is causing the increased risks for falls.    Will hold off on peer to peer  32 [facility] DO

## 2022-12-01 NOTE — TELEPHONE ENCOUNTER
Patient called has been waiting on someone to call her to schedule surgery for her feet. Pain in feet is getting worse. Patient was in the office on 9.28 and was told that someone will contact her to schedule surgery. Patient stated she has called several times regarding scheduling her surgery and was told someone will contact her.     Patient can be reached at [phone]

## 2022-12-01 NOTE — TELEPHONE ENCOUNTER
Relayed to patient that CT brain/head [address] is in [facility] 1268 status. Verbalized understanding.

## 2022-12-01 NOTE — TELEPHONE ENCOUNTER
Pt aware of denial and to follow up with Neuro. Pt would like to know if her CT scan has been approved or denied.

## 2022-12-03 ENCOUNTER — HOSPITAL ENCOUNTER (OUTPATIENT)
Dept: CT IMAGING | Age: 57
Discharge: HOME | End: 2022-12-03
Attending: INTERNAL MEDICINE
Payer: MEDICAID

## 2022-12-03 ENCOUNTER — APPOINTMENT (OUTPATIENT)
Dept: MRI IMAGING | Age: 57
End: 2022-12-03
Attending: INTERNAL MEDICINE
Payer: MEDICAID
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
## 2022-12-03 PROCEDURE — 70450 CT HEAD/BRAIN W/O DYE: CPT | Performed by: INTERNAL MEDICINE

## 2022-12-05 ENCOUNTER — NURSE ONLY (OUTPATIENT)
Dept: INTERNAL MEDICINE CLINIC | Facility: CLINIC | Age: 57
End: 2022-12-05
Payer: MEDICAID

## 2022-12-05 RX ADMIN — CYANOCOBALAMIN 1000 MCG: 1000 INJECTION, SOLUTION INTRAMUSCULAR; SUBCUTANEOUS at 12:57:00

## 2022-12-21 DIAGNOSIS — F41.9 ANXIETY: ICD-10-CM

## 2022-12-21 RX ORDER — ALPRAZOLAM 0.25 MG/1
0.25 TABLET ORAL NIGHTLY PRN
Qty: 20 TABLET | Refills: 0 | Status: SHIPPED | OUTPATIENT
Start: 2022-12-21

## 2022-12-21 NOTE — TELEPHONE ENCOUNTER
Refill request for ALPRAZolam (XANAX) 0.25 MG Oral Tab. [facility]  [address] [place] [address], [facility], [address] [place] [address]  Phone: [phone].

## 2023-01-02 ENCOUNTER — HOSPITAL ENCOUNTER (EMERGENCY)
Age: 58
Discharge: HOME | End: 2023-01-02
Attending: EMERGENCY MEDICINE
Payer: COMMERCIAL

## 2023-01-02 ENCOUNTER — MOBILE ENCOUNTER (OUTPATIENT)
Dept: INTERNAL MEDICINE CLINIC | Facility: CLINIC | Age: 58
End: 2023-01-02

## 2023-01-02 VITALS
TEMPERATURE: 97 F | WEIGHT: 165 LBS | BODY MASS INDEX: 28.17 KG/M2 | DIASTOLIC BLOOD PRESSURE: 71 MMHG | HEIGHT: 64 IN | RESPIRATION RATE: 16 BRPM | OXYGEN SATURATION: 97 % | HEART RATE: 68 BPM | SYSTOLIC BLOOD PRESSURE: 144 MMHG

## 2023-01-02 DIAGNOSIS — M76.31 IT BAND SYNDROME, RIGHT: ICD-10-CM
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
## 2023-01-02 DIAGNOSIS — M54.12 CERVICAL RADICULOPATHY: Primary | ICD-10-CM

## 2023-01-02 PROCEDURE — 99283 EMERGENCY DEPT VISIT LOW MDM: CPT

## 2023-01-02 PROCEDURE — 96372 THER/PROPH/DIAG INJ SC/IM: CPT

## 2023-01-02 RX ORDER — DIAZEPAM 5 MG/1
2.5 TABLET ORAL ONCE
Status: COMPLETED | OUTPATIENT
Start: 2023-01-02 | End: 2023-01-02

## 2023-01-02 RX ORDER — KETOROLAC TROMETHAMINE 30 MG/ML
30 INJECTION, SOLUTION INTRAMUSCULAR; INTRAVENOUS ONCE
Status: COMPLETED | OUTPATIENT
Start: 2023-01-02 | End: 2023-01-02

## 2023-01-02 RX ORDER — PREDNISONE 20 MG/1
40 TABLET ORAL DAILY
Qty: 8 TABLET | Refills: 0 | Status: SHIPPED | OUTPATIENT
Start: 2023-01-03 | End: 2023-01-02

## 2023-01-02 RX ORDER — HYDROCODONE BITARTRATE AND ACETAMINOPHEN 5; 325 MG/1; MG/1
1 TABLET ORAL ONCE
Status: COMPLETED | OUTPATIENT
Start: 2023-01-02 | End: 2023-01-02

## 2023-01-02 RX ORDER — DIAZEPAM 2 MG/1
2 TABLET ORAL EVERY 12 HOURS PRN
Qty: 6 TABLET | Refills: 0 | Status: SHIPPED | OUTPATIENT
Start: 2023-01-02 | End: 2023-01-02

## 2023-01-02 RX ORDER — HYDROCODONE BITARTRATE AND ACETAMINOPHEN 5; 325 MG/1; MG/1
1 TABLET ORAL EVERY 8 HOURS PRN
Qty: 6 TABLET | Refills: 0 | Status: SHIPPED | OUTPATIENT
Start: 2023-01-02 | End: 2023-01-08

## 2023-01-02 RX ORDER — PREDNISONE 20 MG/1
40 TABLET ORAL DAILY
Qty: 8 TABLET | Refills: 0 | Status: SHIPPED | OUTPATIENT
Start: 2023-01-03 | End: 2023-01-07

## 2023-01-02 RX ORDER — DIAZEPAM 2 MG/1
2 TABLET ORAL EVERY 12 HOURS PRN
Qty: 6 TABLET | Refills: 0 | Status: SHIPPED | OUTPATIENT
Start: 2023-01-02 | End: 2023-01-08

## 2023-01-02 RX ORDER — DEXAMETHASONE 4 MG/1
10 TABLET ORAL ONCE
Status: COMPLETED | OUTPATIENT
Start: 2023-01-02 | End: 2023-01-02

## 2023-01-02 RX ORDER — HYDROCODONE BITARTRATE AND ACETAMINOPHEN 5; 325 MG/1; MG/1
1 TABLET ORAL EVERY 8 HOURS PRN
Qty: 6 TABLET | Refills: 0 | Status: SHIPPED | OUTPATIENT
Start: 2023-01-02 | End: 2023-01-02

## 2023-01-02 NOTE — PROGRESS NOTES
On call page  Patient in [address] 5 days ago. Evaluated at that time at [facility] and released home. C/o worsening neck and low back pain, now with radiation into UE and new onset numbness & tingling in UE. Has chronic HAs, unchanged. Denies CP, SOB, focal weakness, loss of bowel or bladder control. States pain is unbearable. Rec eval at IC or ED. Patient agrees to plan.
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
## 2023-01-10 ENCOUNTER — NURSE ONLY (OUTPATIENT)
Dept: INTERNAL MEDICINE CLINIC | Facility: CLINIC | Age: 58
End: 2023-01-10
Payer: MEDICAID

## 2023-01-10 DIAGNOSIS — F41.9 ANXIETY: ICD-10-CM

## 2023-01-10 DIAGNOSIS — R42 DIZZINESS: ICD-10-CM

## 2023-01-10 PROCEDURE — 96372 THER/PROPH/DIAG INJ SC/IM: CPT | Performed by: INTERNAL MEDICINE

## 2023-01-10 RX ORDER — MECLIZINE HCL 12.5 MG/1
TABLET ORAL
Qty: 20 TABLET | Refills: 0 | Status: SHIPPED | OUTPATIENT
Start: 2023-01-10

## 2023-01-10 RX ADMIN — CYANOCOBALAMIN 1000 MCG: 1000 INJECTION, SOLUTION INTRAMUSCULAR; SUBCUTANEOUS at 12:22:00

## 2023-01-11 RX ORDER — ALPRAZOLAM 0.25 MG/1
TABLET ORAL
Qty: 20 TABLET | Refills: 0 | Status: SHIPPED | OUTPATIENT
Start: 2023-01-11

## 2023-01-18 ENCOUNTER — TELEPHONE (OUTPATIENT)
Dept: INTERNAL MEDICINE CLINIC | Facility: CLINIC | Age: 58
End: 2023-01-18

## 2023-01-24 ENCOUNTER — LAB ENCOUNTER (OUTPATIENT)
Dept: LAB | Age: 58
End: 2023-01-24
Attending: INTERNAL MEDICINE
Payer: MEDICAID

## 2023-01-24 ENCOUNTER — OFFICE VISIT (OUTPATIENT)
Dept: INTERNAL MEDICINE CLINIC | Facility: CLINIC | Age: 58
End: 2023-01-24
Payer: MEDICAID

## 2023-01-24 VITALS
WEIGHT: 171 LBS | SYSTOLIC BLOOD PRESSURE: 114 MMHG | TEMPERATURE: 98 F | OXYGEN SATURATION: 98 % | DIASTOLIC BLOOD PRESSURE: 70 MMHG | RESPIRATION RATE: 16 BRPM | HEIGHT: 64 IN | HEART RATE: 61 BPM | BODY MASS INDEX: 29.19 KG/M2

## 2023-01-24 DIAGNOSIS — E66.3 OVERWEIGHT (BMI 25.0-29.9): ICD-10-CM

## 2023-01-24 DIAGNOSIS — Z00.00 ROUTINE PHYSICAL EXAMINATION: Primary | ICD-10-CM

## 2023-01-24 DIAGNOSIS — G44.89 OTHER HEADACHE SYNDROME: ICD-10-CM

## 2023-01-24 DIAGNOSIS — R56.9 SEIZURE (HCC): ICD-10-CM

## 2023-01-24 DIAGNOSIS — G47.10 EXCESSIVE SLEEPINESS: ICD-10-CM

## 2023-01-24 DIAGNOSIS — K50.10 CROHN'S DISEASE OF COLON WITHOUT COMPLICATION (HCC): ICD-10-CM

## 2023-01-24 DIAGNOSIS — F41.9 ANXIETY: ICD-10-CM

## 2023-01-24 DIAGNOSIS — I73.00 RAYNAUD'S PHENOMENON WITHOUT GANGRENE: ICD-10-CM
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
## 2023-01-24 LAB
ANION GAP SERPL CALC-SCNC: 5 MMOL/L (ref 0–18)
BUN BLD-MCNC: 13 MG/DL (ref 7–18)
CALCIUM BLD-MCNC: 9.4 MG/DL (ref 8.5–10.1)
CHLORIDE SERPL-SCNC: 108 MMOL/L (ref 98–112)
CHOLEST SERPL-MCNC: 236 MG/DL (ref ?–200)
CO2 SERPL-SCNC: 27 MMOL/L (ref 21–32)
CREAT BLD-MCNC: 0.86 MG/DL
FASTING PATIENT LIPID ANSWER: NO
FASTING STATUS PATIENT QL REPORTED: NO
GFR SERPLBLD BASED ON 1.73 SQ M-ARVRAT: 80 ML/MIN/1.73M2 (ref 60–?)
GLUCOSE BLD-MCNC: 99 MG/DL (ref 70–99)
HDLC SERPL-MCNC: 59 MG/DL (ref 40–59)
LDLC SERPL CALC-MCNC: 154 MG/DL (ref ?–100)
NONHDLC SERPL-MCNC: 177 MG/DL (ref ?–130)
OSMOLALITY SERPL CALC.SUM OF ELEC: 290 MOSM/KG (ref 275–295)
POTASSIUM SERPL-SCNC: 3.7 MMOL/L (ref 3.5–5.1)
SODIUM SERPL-SCNC: 140 MMOL/L (ref 136–145)
TRIGL SERPL-MCNC: 129 MG/DL (ref 30–149)
VLDLC SERPL CALC-MCNC: 25 MG/DL (ref 0–30)

## 2023-01-24 PROCEDURE — 80048 BASIC METABOLIC PNL TOTAL CA: CPT | Performed by: INTERNAL MEDICINE

## 2023-01-24 PROCEDURE — 3074F SYST BP LT 130 MM HG: CPT | Performed by: INTERNAL MEDICINE

## 2023-01-24 PROCEDURE — 99396 PREV VISIT EST AGE 40-64: CPT | Performed by: INTERNAL MEDICINE

## 2023-01-24 PROCEDURE — 36415 COLL VENOUS BLD VENIPUNCTURE: CPT | Performed by: INTERNAL MEDICINE

## 2023-01-24 PROCEDURE — 80061 LIPID PANEL: CPT | Performed by: INTERNAL MEDICINE

## 2023-01-24 PROCEDURE — 3008F BODY MASS INDEX DOCD: CPT | Performed by: INTERNAL MEDICINE

## 2023-01-24 PROCEDURE — 3078F DIAST BP <80 MM HG: CPT | Performed by: INTERNAL MEDICINE

## 2023-01-24 RX ORDER — ESCITALOPRAM OXALATE 20 MG/1
20 TABLET ORAL DAILY
Qty: 90 TABLET | Refills: 1 | Status: SHIPPED | OUTPATIENT
Start: 2023-01-24 | End: 2024-01-19

## 2023-01-24 RX ORDER — CYCLOBENZAPRINE HCL 10 MG
10 TABLET ORAL 3 TIMES DAILY
COMMUNITY
Start: 2022-12-28

## 2023-01-24 RX ORDER — AMOXICILLIN 500 MG/1
CAPSULE ORAL
COMMUNITY
Start: 2023-01-18

## 2023-01-24 NOTE — PATIENT INSTRUCTIONS
I do recommend to stop smoking  Continue to exercise at least 150 minutes a week and Eat a plant based diet   Please take 2000 IU of vitamin D daily    I have increased your lexapro to 20 mg. Continue follow up with your therapist and let me know how you are feeling in 6 weeks    I do recommend the shingles vaccine and you can get that from the pharmacy.  The vaccine helps to prevent shingles which is a painful disease that affects the nerves    See me back in 6 weeks    Please have blood tests done

## 2023-02-06 RX ORDER — ESCITALOPRAM OXALATE 10 MG/1
TABLET ORAL
Qty: 90 TABLET | Refills: 0 | OUTPATIENT
Start: 2023-02-06

## 2023-02-14 RX ORDER — GABAPENTIN 300 MG/1
CAPSULE ORAL
Qty: 60 CAPSULE | Refills: 0 | OUTPATIENT
Start: 2023-02-14

## 2023-02-14 NOTE — TELEPHONE ENCOUNTER
Per previous note    Refused.  Spoke with the pharmacy who states that they have a prescription on file

## 2023-02-28 ENCOUNTER — TELEPHONE (OUTPATIENT)
Dept: SURGERY | Facility: CLINIC | Age: 58
End: 2023-02-28

## 2023-02-28 NOTE — TELEPHONE ENCOUNTER
Dr. Tres Baez calling stating he performed the EEG and it didn't show any epilepsy. Dr. Tres Baez would like a call back from Dr. Sai Matthews to [phone].

## 2023-02-28 NOTE — TELEPHONE ENCOUNTER
I spoke with Dr Yamel Baldwin who states EEG did not capture seizure discharges, recommended to wean off keppra, which I will discuss with pt next visit,

## 2023-03-13 ENCOUNTER — OFFICE VISIT (OUTPATIENT)
Dept: INTERNAL MEDICINE CLINIC | Facility: CLINIC | Age: 58
End: 2023-03-13
Payer: MEDICAID

## 2023-03-13 VITALS
TEMPERATURE: 98 F | HEIGHT: 64 IN | WEIGHT: 173 LBS | RESPIRATION RATE: 16 BRPM | BODY MASS INDEX: 29.53 KG/M2 | HEART RATE: 77 BPM | OXYGEN SATURATION: 100 % | DIASTOLIC BLOOD PRESSURE: 80 MMHG | SYSTOLIC BLOOD PRESSURE: 122 MMHG

## 2023-03-13 DIAGNOSIS — G47.10 EXCESSIVE SLEEPINESS: ICD-10-CM

## 2023-03-13 DIAGNOSIS — Z12.2 ENCOUNTER FOR SCREENING FOR LUNG CANCER: ICD-10-CM

## 2023-03-13 DIAGNOSIS — M54.9 UPPER BACK PAIN: Primary | ICD-10-CM

## 2023-03-13 DIAGNOSIS — E53.8 VITAMIN B12 DEFICIENCY: ICD-10-CM

## 2023-03-13 DIAGNOSIS — M54.9 MID BACK PAIN: ICD-10-CM

## 2023-03-13 DIAGNOSIS — R56.9 SEIZURE (HCC): ICD-10-CM

## 2023-03-13 RX ORDER — PYRAZINAMIDE 500 MG/1
1 TABLET ORAL EVERY 6 HOURS PRN
COMMUNITY
Start: 2023-02-08

## 2023-03-13 RX ORDER — CEFDINIR 300 MG/1
CAPSULE ORAL
COMMUNITY
Start: 2023-02-17 | End: 2023-03-13 | Stop reason: ALTCHOICE

## 2023-03-13 RX ORDER — NAPROXEN 250 MG/1
TABLET ORAL
COMMUNITY
Start: 2023-02-17

## 2023-03-13 RX ADMIN — CYANOCOBALAMIN 1000 MCG: 1000 INJECTION, SOLUTION INTRAMUSCULAR; SUBCUTANEOUS at 11:07:00

## 2023-03-13 NOTE — PATIENT INSTRUCTIONS
I have ordered the x ray of the neck and upper/lower back    Please complete PT    Please follow up with your neurologist

## 2023-03-24 ENCOUNTER — HOSPITAL ENCOUNTER (OUTPATIENT)
Dept: GENERAL RADIOLOGY | Age: 58
Discharge: HOME | End: 2023-03-24
Attending: INTERNAL MEDICINE
Payer: MEDICAID

## 2023-03-24 DIAGNOSIS — M54.9 MID BACK PAIN: ICD-10-CM

## 2023-03-24 DIAGNOSIS — M54.9 UPPER BACK PAIN: ICD-10-CM

## 2023-03-24 PROCEDURE — 72040 X-RAY EXAM NECK SPINE 2-3 VW: CPT | Performed by: INTERNAL MEDICINE

## 2023-03-24 PROCEDURE — 72110 X-RAY EXAM L-2 SPINE 4/>VWS: CPT | Performed by: INTERNAL MEDICINE

## 2023-03-24 PROCEDURE — 72072 X-RAY EXAM THORAC SPINE 3VWS: CPT | Performed by: INTERNAL MEDICINE

## 2023-03-31 ENCOUNTER — TELEPHONE (OUTPATIENT)
Dept: PHYSICAL THERAPY | Facility: HOSPITAL | Age: 58
End: 2023-03-31

## 2023-03-31 ENCOUNTER — APPOINTMENT (OUTPATIENT)
Dept: PHYSICAL THERAPY | Age: 58
End: 2023-03-31
Attending: INTERNAL MEDICINE
Payer: MEDICAID

## 2023-04-05 ENCOUNTER — APPOINTMENT (OUTPATIENT)
Dept: PHYSICAL THERAPY | Age: 58
End: 2023-04-05
Attending: INTERNAL MEDICINE
Payer: MEDICAID

## 2023-04-05 ENCOUNTER — TELEPHONE (OUTPATIENT)
Dept: PHYSICAL THERAPY | Facility: HOSPITAL | Age: 58
End: 2023-04-05

## 2023-04-07 ENCOUNTER — OFFICE VISIT (OUTPATIENT)
Dept: PHYSICAL THERAPY | Age: 58
End: 2023-04-07
Attending: INTERNAL MEDICINE
Payer: MEDICAID

## 2023-04-07 DIAGNOSIS — M54.9 UPPER BACK PAIN: ICD-10-CM

## 2023-04-07 DIAGNOSIS — M54.9 MID BACK PAIN: ICD-10-CM

## 2023-04-07 PROCEDURE — 97161 PT EVAL LOW COMPLEX 20 MIN: CPT

## 2023-04-07 PROCEDURE — 97140 MANUAL THERAPY 1/> REGIONS: CPT

## 2023-04-10 ENCOUNTER — TELEPHONE (OUTPATIENT)
Dept: PHYSICAL THERAPY | Facility: HOSPITAL | Age: 58
End: 2023-04-10

## 2023-04-10 ENCOUNTER — APPOINTMENT (OUTPATIENT)
Dept: PHYSICAL THERAPY | Age: 58
End: 2023-04-10
Attending: INTERNAL MEDICINE
Payer: MEDICAID

## 2023-04-11 ENCOUNTER — TELEPHONE (OUTPATIENT)
Dept: ORTHOPEDICS CLINIC | Facility: CLINIC | Age: 58
End: 2023-04-11

## 2023-04-14 ENCOUNTER — NURSE ONLY (OUTPATIENT)
Dept: INTERNAL MEDICINE CLINIC | Facility: CLINIC | Age: 58
End: 2023-04-14
Payer: MEDICAID

## 2023-04-14 ENCOUNTER — APPOINTMENT (OUTPATIENT)
Dept: PHYSICAL THERAPY | Age: 58
End: 2023-04-14
Attending: INTERNAL MEDICINE
Payer: MEDICAID

## 2023-04-14 ENCOUNTER — HOSPITAL ENCOUNTER (OUTPATIENT)
Dept: CT IMAGING | Age: 58
Discharge: HOME | End: 2023-04-14
Attending: INTERNAL MEDICINE
Payer: MEDICAID

## 2023-04-14 ENCOUNTER — OFFICE VISIT (OUTPATIENT)
Dept: PHYSICAL THERAPY | Age: 58
End: 2023-04-14
Attending: INTERNAL MEDICINE
Payer: MEDICAID

## 2023-04-14 ENCOUNTER — HOSPITAL ENCOUNTER (OUTPATIENT)
Dept: MAMMOGRAPHY | Age: 58
Discharge: HOME | End: 2023-04-14
Attending: INTERNAL MEDICINE
Payer: MEDICAID

## 2023-04-14 DIAGNOSIS — Z12.2 ENCOUNTER FOR SCREENING FOR LUNG CANCER: ICD-10-CM

## 2023-04-14 DIAGNOSIS — Z12.31 ENCOUNTER FOR SCREENING MAMMOGRAM FOR MALIGNANT NEOPLASM OF BREAST: ICD-10-CM

## 2023-04-14 PROCEDURE — 77063 BREAST TOMOSYNTHESIS BI: CPT | Performed by: INTERNAL MEDICINE

## 2023-04-14 PROCEDURE — 97110 THERAPEUTIC EXERCISES: CPT

## 2023-04-14 PROCEDURE — 77067 SCR MAMMO BI INCL CAD: CPT | Performed by: INTERNAL MEDICINE

## 2023-04-14 PROCEDURE — 97140 MANUAL THERAPY 1/> REGIONS: CPT

## 2023-04-14 PROCEDURE — 96372 THER/PROPH/DIAG INJ SC/IM: CPT | Performed by: INTERNAL MEDICINE

## 2023-04-14 PROCEDURE — 71271 CT THORAX LUNG CANCER SCR C-: CPT | Performed by: INTERNAL MEDICINE

## 2023-04-14 RX ADMIN — CYANOCOBALAMIN 1000 MCG: 1000 INJECTION, SOLUTION INTRAMUSCULAR; SUBCUTANEOUS at 12:30:00

## 2023-04-14 NOTE — PROGRESS NOTES
Dx: Upper back pain , Mid back pain         Authorized # of Visits:  12 ( POC)        Next MD visit: none scheduled            Precautions: None              Subjective: patient reports \" constant cervical , thoracic and lumbar pain and tingling / numbness in her B arms , hands and feet . \"  Pain: 5-6/10 with with neck movements     Objective:       Assessment: reviewed initial HEP with patient to ensure proper form . Initiated gentle AROM ex's for CS and initiated scapula strength ex's to improve and facilitate neck and thoracic spine mobility and stability with sustained sitting and standing . Goals: ( to be met in 12 visits )  1. Pt will improve cervical AROM flexion 45 degrees to improve tolerance for looking down to tie shoes . 2.Pt will improve cervical AROM extension to 50 degrees to improve tolerance for putting dishes into overhead cabinets . 3. Pt will improve cervical AROM rotation to > 70 degrees to improve tolerance for turning head to check blind spot while driving . 4. Pt will have improved thoracic PA mobility to WNL to improve cervical ROM as well as promote upright posturing and  pain with overhead reaching activities . 5. Pt will improve postural strength ( mid/low trap ) to promote improved upright posturing and decrease pain with prolonged sitting activities . 6. Pt will be independent and compliant with comprehensive HEP to maintain progress achieved in P      Plan:   Date: 2023  TX#:  Date:  TX#: 3/   Date:  TX#: / Date:  TX#: 5/ Date:  TX#: 6/ Date:  TX#: 7/ Date:  TX#: 8/   MANUAL X 30   MINS         Supine :  STM to B CS paraspinals and R/L UT   Prone :  STM to R/L thoracic paraspinals   jt mobs to R/L thoracic paraspinals                            THERE EX : 15 MINS         Supine :  Cervical AROM :  Flex , ext , R rot , L rot x 10 reps each          Seated :  Shoulder circles x 10 reps each way          Standing against foam roller :  Pectoral active  stretch  X 5 reps with 10 sec hold          Standing :  Rows with YTB x 10  Sh ext with YTB x 10          MINS                   MINS                                          Charges: manual mobs x 2 , there ex x 1       Total Timed Treatment: 45 min  Total Treatment Time: 45 min

## 2023-04-17 ENCOUNTER — TELEPHONE (OUTPATIENT)
Dept: ORTHOPEDICS CLINIC | Facility: CLINIC | Age: 58
End: 2023-04-17

## 2023-04-17 NOTE — TELEPHONE ENCOUNTER
Called and spoke with Donald Rehman in regards to getting her scheduled for surgery. I did inquire as to which foot she would be having surgery on, she states that she was unsure as she has a hammer toe on the left foot and an bunion on the right foot. She states that she has not seen Dr. Mervat Espana in over a year. I did advise that she come into the office to be seen by the provider, so that she can be assessed first before she proceeds with surgery. She states that when she called she asked if she needed to see the provider first before she proceeds with surgery and was told that she did not. I did insure her that she would need to see the provider, so that she can reassess her. I did get her scheduled to see Dr. Mervat Espana. She has opted to be seen on 4/26/23 @ 9:15.

## 2023-04-19 ENCOUNTER — OFFICE VISIT (OUTPATIENT)
Dept: PHYSICAL THERAPY | Age: 58
End: 2023-04-19
Attending: INTERNAL MEDICINE
Payer: MEDICAID

## 2023-04-19 PROCEDURE — 97140 MANUAL THERAPY 1/> REGIONS: CPT

## 2023-04-19 PROCEDURE — 97110 THERAPEUTIC EXERCISES: CPT

## 2023-04-19 NOTE — PROGRESS NOTES
Dx: Upper back pain , Mid back pain         Authorized # of Visits:  12 ( POC)        Next MD visit: none scheduled            Precautions: None              Subjective:  patient reports \" that she was very sore in her neck B UT and mid back for 4 days after last visit . Today I am in pain 6/10 in my neck , B UT and I feel constant tingling in my B arms and hands \"    Pain: 6 /10 with with neck movements     Objective:       Assessment: continued with STM to B cervical paraspinals , B UT and B scapulas and B thoracic paraspinals and continued with jt mobs to cervical and thoracic ( C5 - T 10 ) to decrease mm tension and to improve spinal mobility in order to facilitate and promote improved posture with sustained sitting . Held resistance for scapula ex's and continued with gentle AROM  ex's ( mid range ) for cervical and thoracic spine this visit . Patient continues to present with high sensitivity to palpation efra along R UT , R levator scapula and along B  cervical and B  thoracic paraspinals mm and demonstrates exaggerated response to palpation due to hypersensitivity . Patient was educated on pain neuroscience with instruction to focus on function versus pain . Provided education on healthy posture positioning using lumbar roll to decrease and prevent mm onset of mm tension and stiffness with prolong sitting . Goals: ( to be met in 12 visits )  1. Pt will improve cervical AROM flexion 45 degrees to improve tolerance for looking down to tie shoes . 2.Pt will improve cervical AROM extension to 50 degrees to improve tolerance for putting dishes into overhead cabinets . 3. Pt will improve cervical AROM rotation to > 70 degrees to improve tolerance for turning head to check blind spot while driving . 4. Pt will have improved thoracic PA mobility to WNL to improve cervical ROM as well as promote upright posturing and  pain with overhead reaching activities . 5. Pt will improve postural strength ( mid/low trap ) to promote improved upright posturing and decrease pain with prolonged sitting activities . 6. Pt will be independent and compliant with comprehensive HEP to maintain progress achieved in P      Plan: continue PT   Date: 4/14/2023  TX#: 2/12 Date:4/19/2023  TX#: 3/12   Date:  TX#: 4/ Date:  TX#: 5/ Date:  TX#: 6/ Date:  TX#: 7/ Date:  TX#: 8/   MANUAL X 30   MINS MANUAL MOBS X 30 MIN         Supine :  STM to B CS paraspinals and R/L UT   Prone :  STM to R/L thoracic paraspinals   jt mobs to R/L thoracic paraspinals  Supine :  STM to B CS paraspinals , B UT   Prone :  STM to B thoracic paraspinal and B scapula region   jt mobs to C5 - T10 paraspinals gr 1-2                           THERE EX : 15 MINS THERE EX : 45 MIN         Supine :  Cervical AROM :  Flex , ext , R rot , L rot x 10 reps each  Seated :  CS AROM in all dir ( mid range ) x 10 reps each         Seated :  Shoulder circles x 10 reps each way  Standing against wall :  AROM cervical retraction x 10  B scapula retraction x 10        Standing against foam roller :  Pectoral active  stretch  X 5 reps with 10 sec hold  B rows with scapula retraction x 10  B scapula retraction with shoulder extension x 10        Standing :  Rows with YTB x 10  Sh ext with YTB x 10          MINS                   MINS                                          Charges: manual mobs x 2 , there ex x 1       Total Timed Treatment: 45 min  Total Treatment Time: 45 min

## 2023-04-20 ENCOUNTER — TELEPHONE (OUTPATIENT)
Dept: PHYSICAL THERAPY | Facility: HOSPITAL | Age: 58
End: 2023-04-20

## 2023-04-21 ENCOUNTER — APPOINTMENT (OUTPATIENT)
Dept: PHYSICAL THERAPY | Age: 58
End: 2023-04-21
Attending: INTERNAL MEDICINE
Payer: MEDICAID

## 2023-04-21 ENCOUNTER — TELEPHONE (OUTPATIENT)
Dept: PHYSICAL THERAPY | Facility: HOSPITAL | Age: 58
End: 2023-04-21

## 2023-04-25 DIAGNOSIS — R92.8 ABNORMAL MAMMOGRAM: Primary | ICD-10-CM

## 2023-04-25 DIAGNOSIS — R91.8 ABNORMAL CT LUNG SCREENING: ICD-10-CM

## 2023-04-26 ENCOUNTER — OFFICE VISIT (OUTPATIENT)
Dept: ORTHOPEDICS CLINIC | Facility: CLINIC | Age: 58
End: 2023-04-26
Payer: MEDICAID

## 2023-04-26 ENCOUNTER — TELEPHONE (OUTPATIENT)
Dept: INTERNAL MEDICINE CLINIC | Facility: CLINIC | Age: 58
End: 2023-04-26

## 2023-04-26 ENCOUNTER — OFFICE VISIT (OUTPATIENT)
Dept: PHYSICAL THERAPY | Age: 58
End: 2023-04-26
Attending: INTERNAL MEDICINE
Payer: MEDICAID

## 2023-04-26 ENCOUNTER — TELEPHONE (OUTPATIENT)
Dept: ORTHOPEDICS CLINIC | Facility: CLINIC | Age: 58
End: 2023-04-26

## 2023-04-26 VITALS — OXYGEN SATURATION: 99 % | HEART RATE: 75 BPM

## 2023-04-26 DIAGNOSIS — M20.5X1 CROSSOVER TOE DEFORMITY OF RIGHT FOOT: ICD-10-CM

## 2023-04-26 DIAGNOSIS — R20.0 NUMBNESS OF TOES: ICD-10-CM

## 2023-04-26 DIAGNOSIS — F17.200 CURRENT EVERY DAY SMOKER: ICD-10-CM

## 2023-04-26 DIAGNOSIS — M21.619 BUNION: Primary | ICD-10-CM

## 2023-04-26 DIAGNOSIS — M20.41 HAMMER TOE OF RIGHT FOOT: ICD-10-CM

## 2023-04-26 DIAGNOSIS — I73.00 RAYNAUD'S PHENOMENON WITHOUT GANGRENE: ICD-10-CM

## 2023-04-26 DIAGNOSIS — M77.50 CAPSULITIS OF METATARSOPHALANGEAL (MTP) JOINT: ICD-10-CM

## 2023-04-26 PROCEDURE — 97110 THERAPEUTIC EXERCISES: CPT

## 2023-04-26 PROCEDURE — 97140 MANUAL THERAPY 1/> REGIONS: CPT

## 2023-04-26 PROCEDURE — 99214 OFFICE O/P EST MOD 30 MIN: CPT | Performed by: PODIATRIST

## 2023-04-26 RX ORDER — AMOXICILLIN AND CLAVULANATE POTASSIUM 875; 125 MG/1; MG/1
1 TABLET, FILM COATED ORAL 2 TIMES DAILY
Qty: 20 TABLET | Refills: 0 | COMMUNITY
Start: 2023-04-24 | End: 2023-05-04

## 2023-04-26 RX ORDER — CEPHALEXIN 500 MG/1
CAPSULE ORAL
COMMUNITY
Start: 2023-04-25

## 2023-04-26 NOTE — PROGRESS NOTES
[facility] Progress Note    Subjective:     [address] returns after some absence hoping to proceed with surgery of her right foot. She recently had an I&D with another podiatrist for the left foot as she had stepped on foreign body she thinks. It was suspected there was a hair possibly a dog hair in there. This feels better and this involves the left foot. She is planning to follow-up with that podiatrist later in the week. As far as her right foot she had hoped to go ahead with surgery. With questioning she has cold feet and some numbness of the toes. 1 year ago she did have normal blood flow on a TRACY study        Objective:     Exam right foot moderate hammertoe with full range of motion. Hallux abductus with crowding of the second toe and early second toe overlap. Semirigid hammertoe at the second pip joint and contracture at the second MP joint  Full sensation versus dull. She can feel light touch to the toes  Palpable pedal pulses  Hair growth and skin is well-perfused    Review of x-rays show hammertoe deformity and early overlap second toe. Bunion deformity first MP joint            Assessment/Plan:     Diagnoses and all orders for this visit:    Bunion    Crossover toe deformity of right foot    Hammer toe of right foot    Capsulitis of metatarsophalangeal (MTP) joint    Raynaud's phenomenon without gangrene    Current every day smoker    Numbness of toes        Discussed proposed surgery for the right foot. She would like to proceed. We discussed a first metatarsal osteotomy with screw fixation followed by Weil osteotomy of the second metatarsal to allow relaxation of the MP joint. Screw fixation in that area as well. Lastly fusion of the PIP joint second toe with most likely small implant and discussed K wire versus Smart toe versus phalinx implant per [facility]  We discussed the postop course which would be a low-profile boot weightbearing right away possibly for 4 to 6 weeks.   We discussed the MAC with local and potential complications especially as a smoker and her other medical issues. We discussed potential complications including recurrence, infection, need to remove hardware and continued pain. She would like to proceed    SURGERY SCHEDULING SHEET    Lyle Arguello  9/6/1965  [id number]    Procedure: Right Second  PIP Correction of Hammertoe (40409)   First metatarsal osteotomy  Weil osteotomy 2nd metatarsal all right foot    Diagnosis:  Bunion [M21.619]  HAV  Hammertoe 2nd toe  Capsulitis 2nd mpj all right foot    Anesthesia: Monitored Anesthesia Care (MAC)     Length of Surgery: 1 hr    Disposition: Outpatient    Special Equipment: Dairl [place]    Assist: No    Pre-op Testing: PER ANESTHESIA GUIDELINES    Clearance: HISTORY AND PHYSICAL     Post op: Day after surgery     [place] 3rd Thurs [id number] [facility], [address] [place] [address] Orthopedic Surgery  Phone: [phone]  Fax: [phone]            Ilda Alicia. Letty Elizabeth DPM  [place] [facility]    [employer] speech recognition software was used to prepare this note. If a word or phrase is confusing, it is likely do to a failure of recognition. Please contact me with any questions or clarifications.

## 2023-04-26 NOTE — TELEPHONE ENCOUNTER
Pt and I spoke to [facility] scheduling. Pt is unable to have CT Calcium Scoring completed b/c it is an out-of-pocket exam regardless of insurance coverage and pt has not worked in 2 years. Pt requesting cb to discuss next steps, f/u to discuss furthermore.

## 2023-04-26 NOTE — TELEPHONE ENCOUNTER
No need for any further imaging.  We will just continue to monitor her cholesterol panel  Anh Crisostomo DO

## 2023-04-26 NOTE — PROGRESS NOTES
Dx: Upper back pain , Mid back pain         Authorized # of Visits:  12 ( POC)        Next MD visit: none scheduled            Precautions: None              Subjective:  Pt reports feeling sore all over. Pt states that she feels that her symptoms have been unchanged. Some bad days and some slightly better days. Pain:  7/10     Objective: See flowsheet for details. Assessment: Pt cont to present with significant discomfort throughout the cervical/thoracic spine. Pain noted with PA glide to the mid thoracic spine. Pt was very guarded with manual therapy and required frequent cuing to relax. Pt also required cuing for form during postural awareness/strengthening exercises. Will cont to progress as able towards all functional goals. Goals: ( to be met in 12 visits )  1. Pt will improve cervical AROM flexion 45 degrees to improve tolerance for looking down to tie shoes . 2.Pt will improve cervical AROM extension to 50 degrees to improve tolerance for putting dishes into overhead cabinets . 3. Pt will improve cervical AROM rotation to > 70 degrees to improve tolerance for turning head to check blind spot while driving . 4. Pt will have improved thoracic PA mobility to WNL to improve cervical ROM as well as promote upright posturing and  pain with overhead reaching activities . 5. Pt will improve postural strength ( mid/low trap ) to promote improved upright posturing and decrease pain with prolonged sitting activities . 6. Pt will be independent and compliant with comprehensive HEP to maintain progress achieved in P    Plan: Will cont to progress as tolerated.    Date: 2023  TX#:  Date:2023  TX#: 3/12   Date: 23  TX#:  Date:  TX#: 5/ Date:  TX#: 6/ Date:  TX#: 7/ Date:  TX#: 8/   MANUAL X 30   MINS MANUAL MOBS X 30 MIN  Therapeutic Exercises:  17 mins         Supine :  STM to B CS paraspinals and R/L UT   Prone :  STM to R/L thoracic paraspinals   jt mobs to R/L thoracic paraspinals  Supine :  STM to B CS paraspinals , B UT   Prone :  STM to B thoracic paraspinal and B scapula region   jt mobs to C5 - T10 paraspinals gr 1-2  Pulleys (flex)  5 mins         Cervical Retractions (supine)  20x5 secs         Scapular Retractions  20x5 secs       THERE EX : 15 MINS THERE EX : 45 MIN  UT Stretch  3x30 secs       Supine :  Cervical AROM :  Flex , ext , R rot , L rot x 10 reps each  Seated :  CS AROM in all dir ( mid range ) x 10 reps each  Wall Catlett against FR  x15       Seated :  Shoulder circles x 10 reps each way  Standing against wall :  AROM cervical retraction x 10  B scapula retraction x 10 Manual Therapy:  25 mins  STM to the B UT and cervical/thoracic paraspinals  Jt Mobs: C7-T10       Standing against foam roller :  Pectoral active  stretch  X 5 reps with 10 sec hold  B rows with scapula retraction x 10  B scapula retraction with shoulder extension x 10        Standing :  Rows with YTB x 10  Sh ext with YTB x 10           Charges:   Manual Therapy x2  Therapeutic Ex x1         Total Timed Treatment: 42 min    Total Treatment Time: 42 min

## 2023-04-28 ENCOUNTER — TELEPHONE (OUTPATIENT)
Dept: PHYSICAL THERAPY | Age: 58
End: 2023-04-28

## 2023-04-28 ENCOUNTER — APPOINTMENT (OUTPATIENT)
Dept: PHYSICAL THERAPY | Age: 58
End: 2023-04-28
Attending: INTERNAL MEDICINE
Payer: MEDICAID

## 2023-05-03 ENCOUNTER — TELEPHONE (OUTPATIENT)
Dept: INTERNAL MEDICINE CLINIC | Facility: CLINIC | Age: 58
End: 2023-05-03

## 2023-05-03 ENCOUNTER — OFFICE VISIT (OUTPATIENT)
Dept: PHYSICAL THERAPY | Age: 58
End: 2023-05-03
Attending: INTERNAL MEDICINE
Payer: MEDICAID

## 2023-05-03 PROCEDURE — 97140 MANUAL THERAPY 1/> REGIONS: CPT

## 2023-05-03 PROCEDURE — 97110 THERAPEUTIC EXERCISES: CPT

## 2023-05-03 NOTE — TELEPHONE ENCOUNTER
Additional clinical information needed to determine necessity for MRI Breasts. Confirmation placed in Dr. Juliet Carnes in-box.

## 2023-05-03 NOTE — PROGRESS NOTES
Dx: Upper back pain , Mid back pain         Authorized # of Visits:  12 ( POC)        Next MD visit: none scheduled            Precautions: None              Subjective:  Pt continue to  Report that she is  feeling sore all over. The pain comes and goes depending on the day and depending on what she is doing , Overall patient reports feeling the same . Ty Mayen Pain:  7/10     Objective: See flowsheet for details. Assessment: Pt cont to demonstrate  significant discomfort throughout the cervical/thoracic spine up on palpation  and was guarded during  joints mobs of  lower cervical and thoracic spine and required frequent verbal cueing to relax. Increased reps on some of the strength ex's to further progress posture stability with standing and seating without patient's report on increase in pain . Goals: ( to be met in 12 visits )  1. Pt will improve cervical AROM flexion 45 degrees to improve tolerance for looking down to tie shoes . 2.Pt will improve cervical AROM extension to 50 degrees to improve tolerance for putting dishes into overhead cabinets . 3. Pt will improve cervical AROM rotation to > 70 degrees to improve tolerance for turning head to check blind spot while driving . 4. Pt will have improved thoracic PA mobility to WNL to improve cervical ROM as well as promote upright posturing and  pain with overhead reaching activities . 5. Pt will improve postural strength ( mid/low trap ) to promote improved upright posturing and decrease pain with prolonged sitting activities . 6. Pt will be independent and compliant with comprehensive HEP to maintain progress achieved in P    Plan: Will cont to progress as tolerated.    Date: 2023  TX#:  Date:2023  TX#: 3/12   Date: 23  TX#:  Date:23  TX#:  Date:  TX#: 6/ Date:  TX#: 7/ Date:  TX#: 8/   MANUAL X 30   MINS MANUAL MOBS X 30 MIN  Therapeutic Exercises:  17 mins   THERAPEUTIC EX : 20 MIN       Supine :  STM to B CS paraspinals and R/L UT   Prone :  STM to R/L thoracic paraspinals   jt mobs to R/L thoracic paraspinals  Supine :  STM to B CS paraspinals , B UT   Prone :  STM to B thoracic paraspinal and B scapula region   jt mobs to C5 - T10 paraspinals gr 1-2  Pulleys (flex)  5 mins UBE x 6 min , level 1   Pulleys :  Flexion x 5 min         Cervical Retractions (supine)  20x5 secs Cervical retraction in supine x 20 reps x 5 sec hold         Scapular Retractions  20x5 secs Scapula retraction in standing x 20 reps with 5 sec hold       THERE EX : 15 MINS THERE EX : 45 MIN  UT Stretch  3x30 secs UT R/L stretch X R/L with 10 sec hold       Supine :  Cervical AROM :  Flex , ext , R rot , L rot x 10 reps each  Seated :  CS AROM in all dir ( mid range ) x 10 reps each  Wall [place] against FR  x15 Wall angels against FR x 20      Seated :  Shoulder circles x 10 reps each way  Standing against wall :  AROM cervical retraction x 10  B scapula retraction x 10 Manual Therapy:  25 mins  STM to the B UT and cervical/thoracic paraspinals  Jt Mobs: C7-T10 MANUAL THERAPY :  20 MIN   STM to B UT and cervical / thoracic paraspinals   JT mobs : C7- T10       Standing against foam roller :  Pectoral active  stretch  X 5 reps with 10 sec hold  B rows with scapula retraction x 10  B scapula retraction with shoulder extension x 10        Standing :  Rows with YTB x 10  Sh ext with YTB x 10           Charges:   Manual Therapy x2  Therapeutic Ex x1         Total Timed Treatment: 40 min    Total Treatment Time: 45 min

## 2023-05-05 ENCOUNTER — OFFICE VISIT (OUTPATIENT)
Dept: PHYSICAL THERAPY | Age: 58
End: 2023-05-05
Attending: INTERNAL MEDICINE
Payer: MEDICAID

## 2023-05-05 PROCEDURE — 97140 MANUAL THERAPY 1/> REGIONS: CPT

## 2023-05-05 PROCEDURE — 97110 THERAPEUTIC EXERCISES: CPT

## 2023-05-05 NOTE — PROGRESS NOTES
Dx: Upper back pain , Mid back pain         Authorized # of Visits:  12 ( POC)        Next MD visit: none scheduled            Precautions: None              Subjective:  \" neck pain comes and goes depending on what I am doing \". \" Today my neck feels better but the pain continue to comes and goes \"  Pain:  5/10     Objective: See flowsheet for details. Assessment: advanced scapula strength ex's to prone position to further challenge postural stability with prolonged sitting and standing with patient 's good tolerance . Patient was able to perform all given ex's without report of increase in cervical pain . Goals: ( to be met in 12 visits )  1. Pt will improve cervical AROM flexion 45 degrees to improve tolerance for looking down to tie shoes . 2.Pt will improve cervical AROM extension to 50 degrees to improve tolerance for putting dishes into overhead cabinets . 3. Pt will improve cervical AROM rotation to > 70 degrees to improve tolerance for turning head to check blind spot while driving . 4. Pt will have improved thoracic PA mobility to WNL to improve cervical ROM as well as promote upright posturing and  pain with overhead reaching activities . 5. Pt will improve postural strength ( mid/low trap ) to promote improved upright posturing and decrease pain with prolonged sitting activities . 6. Pt will be independent and compliant with comprehensive HEP to maintain progress achieved in P    Plan: Will cont to progress as tolerated.    Date: 2023  TX#:  Date:2023  TX#: 3/12   Date: 23  TX#:  Date:23  TX#:  Date:23  TX#:  Date:  TX#: 7/ Date:  TX#: 8/   MANUAL X 30   MINS MANUAL MOBS X 30 MIN  Therapeutic Exercises:  17 mins   THERAPEUTIC EX : 20 MIN  THERAPEUTIC EX : 20 MIN      Supine :  STM to B CS paraspinals and R/L UT   Prone :  STM to R/L thoracic paraspinals   jt mobs to R/L thoracic paraspinals  Supine :  STM to B CS paraspinals , B UT   Prone :  STM to B thoracic paraspinal and B scapula region   jt mobs to C5 - T10 paraspinals gr 1-2  Pulleys (flex)  5 mins UBE x 6 min , level 1   Pulleys :  Flexion x 5 min  UBE x 6 min , level 1   Pulleys :   Flexion x 5 min   Standing :  B scapula retraction :  Sh extension with YTB x 10  B sh rows with YTB x 20       Cervical Retractions (supine)  20x5 secs Cervical retraction in supine x 20 reps x 5 sec hold  Cervical retraction in supine x 20 reps with 5 sec hold        Scapular Retractions  20x5 secs Scapula retraction in standing x 20 reps with 5 sec hold  Standing against FR :  Scapula retraction x 20 with 10 sec hold      THERE EX : 15 MINS THERE EX : 45 MIN  UT Stretch  3x30 secs UT R/L stretch X R/L with 10 sec hold  Prone :  Scapula retraction x 10 with 5 sec hold      Supine :  Cervical AROM :  Flex , ext , R rot , L rot x 10 reps each  Seated :  CS AROM in all dir ( mid range ) x 10 reps each  Wall [place] against FR  x15 Wall angels against FR x 20 Prone :  B horizontal abduction x 10 with 5 sec hold      Seated :  Shoulder circles x 10 reps each way  Standing against wall :  AROM cervical retraction x 10  B scapula retraction x 10 Manual Therapy:  25 mins  STM to the B UT and cervical/thoracic paraspinals  Jt Mobs: C7-T10 MANUAL THERAPY :  20 MIN   STM to B UT and cervical / thoracic paraspinals   JT mobs : C7- T10  Manual Therapy : 20 min   STM to B UT and cervical / thoracic paraspinals   JT mobs : C7- T 10     Standing against foam roller :  Pectoral active  stretch  X 5 reps with 10 sec hold  B rows with scapula retraction x 10  B scapula retraction with shoulder extension x 10        Standing :  Rows with YTB x 10  Sh ext with YTB x 10           Charges:   Manual Therapy x2  Therapeutic Ex x1         Total Timed Treatment: 40 min    Total Treatment Time: 45 min

## 2023-05-05 NOTE — TELEPHONE ENCOUNTER
Received fax from [address] [place] stating [facility] has been APPROVED. This approval expires 6/17/2023. Confirmation placed in Dr. Matilde Perales in-box.

## 2023-05-08 ENCOUNTER — TELEPHONE (OUTPATIENT)
Dept: PHYSICAL THERAPY | Facility: HOSPITAL | Age: 58
End: 2023-05-08

## 2023-05-08 ENCOUNTER — APPOINTMENT (OUTPATIENT)
Dept: PHYSICAL THERAPY | Age: 58
End: 2023-05-08
Attending: INTERNAL MEDICINE
Payer: MEDICAID

## 2023-05-09 ENCOUNTER — APPOINTMENT (OUTPATIENT)
Dept: PHYSICAL THERAPY | Age: 58
End: 2023-05-09
Attending: Other
Payer: MEDICAID

## 2023-05-09 ENCOUNTER — TELEPHONE (OUTPATIENT)
Dept: SURGERY | Facility: CLINIC | Age: 58
End: 2023-05-09

## 2023-05-09 ENCOUNTER — TELEPHONE (OUTPATIENT)
Dept: PHYSICAL THERAPY | Facility: HOSPITAL | Age: 58
End: 2023-05-09

## 2023-05-09 NOTE — TELEPHONE ENCOUNTER
Pt called reschedule appt due to engine problems emelina mendez on 5/9/23 pt appt was resechudled to 7/18/23

## 2023-05-12 ENCOUNTER — APPOINTMENT (OUTPATIENT)
Dept: PHYSICAL THERAPY | Age: 58
End: 2023-05-12
Attending: INTERNAL MEDICINE
Payer: MEDICAID

## 2023-05-12 ENCOUNTER — TELEPHONE (OUTPATIENT)
Dept: PHYSICAL THERAPY | Facility: HOSPITAL | Age: 58
End: 2023-05-12

## 2023-05-19 ENCOUNTER — TELEPHONE (OUTPATIENT)
Dept: PHYSICAL THERAPY | Age: 58
End: 2023-05-19

## 2023-06-01 ENCOUNTER — OFFICE VISIT (OUTPATIENT)
Dept: INTERNAL MEDICINE CLINIC | Facility: CLINIC | Age: 58
End: 2023-06-01
Payer: MEDICAID

## 2023-06-01 VITALS
RESPIRATION RATE: 14 BRPM | DIASTOLIC BLOOD PRESSURE: 82 MMHG | HEART RATE: 76 BPM | HEIGHT: 63 IN | BODY MASS INDEX: 30.59 KG/M2 | TEMPERATURE: 98 F | SYSTOLIC BLOOD PRESSURE: 122 MMHG | WEIGHT: 172.63 LBS

## 2023-06-01 DIAGNOSIS — Z01.818 PREOP EXAM FOR INTERNAL MEDICINE: Primary | ICD-10-CM

## 2023-06-01 DIAGNOSIS — G44.89 OTHER HEADACHE SYNDROME: ICD-10-CM

## 2023-06-01 DIAGNOSIS — F41.9 ANXIETY: ICD-10-CM

## 2023-06-01 DIAGNOSIS — K50.10 CROHN'S DISEASE OF COLON WITHOUT COMPLICATION (HCC): ICD-10-CM

## 2023-06-01 DIAGNOSIS — R56.9 SEIZURE (HCC): ICD-10-CM

## 2023-06-01 PROCEDURE — 3074F SYST BP LT 130 MM HG: CPT | Performed by: INTERNAL MEDICINE

## 2023-06-01 PROCEDURE — 3079F DIAST BP 80-89 MM HG: CPT | Performed by: INTERNAL MEDICINE

## 2023-06-01 PROCEDURE — 3008F BODY MASS INDEX DOCD: CPT | Performed by: INTERNAL MEDICINE

## 2023-06-01 PROCEDURE — 99243 OFF/OP CNSLTJ NEW/EST LOW 30: CPT | Performed by: INTERNAL MEDICINE

## 2023-06-12 ENCOUNTER — TELEPHONE (OUTPATIENT)
Dept: INTERNAL MEDICINE CLINIC | Facility: CLINIC | Age: 58
End: 2023-06-12

## 2023-06-12 NOTE — TELEPHONE ENCOUNTER
's office called, pt has a surgery this Thursday 6/15. The office needs SV to sigh off on clearance 24 hours prior to procedure. On 6/14 they will need fax with signed clearance. Ph:[phone]  Fx: [phone]  [facility] fx: [phone]    He states they need it faxed to both fax numbers provided.

## 2023-06-15 ENCOUNTER — APPOINTMENT (OUTPATIENT)
Dept: GENERAL RADIOLOGY | Facility: HOSPITAL | Age: 58
End: 2023-06-15
Attending: PODIATRIST
Payer: MEDICAID

## 2023-06-15 ENCOUNTER — HOSPITAL ENCOUNTER (OUTPATIENT)
Facility: HOSPITAL | Age: 58
Setting detail: HOSPITAL OUTPATIENT SURGERY
Discharge: HOME | End: 2023-06-15
Attending: PODIATRIST | Admitting: PODIATRIST
Payer: MEDICAID

## 2023-06-15 ENCOUNTER — ANESTHESIA EVENT (OUTPATIENT)
Dept: SURGERY | Facility: HOSPITAL | Age: 58
End: 2023-06-15
Payer: MEDICAID

## 2023-06-15 ENCOUNTER — ANESTHESIA (OUTPATIENT)
Dept: SURGERY | Facility: HOSPITAL | Age: 58
End: 2023-06-15
Payer: MEDICAID

## 2023-06-15 VITALS
BODY MASS INDEX: 30.59 KG/M2 | SYSTOLIC BLOOD PRESSURE: 143 MMHG | TEMPERATURE: 98 F | DIASTOLIC BLOOD PRESSURE: 93 MMHG | WEIGHT: 177 LBS | RESPIRATION RATE: 20 BRPM | OXYGEN SATURATION: 95 % | HEIGHT: 63.75 IN | HEART RATE: 73 BPM

## 2023-06-15 PROCEDURE — 0QSN04Z REPOSITION RIGHT METATARSAL WITH INTERNAL FIXATION DEVICE, OPEN APPROACH: ICD-10-PCS | Performed by: PODIATRIST

## 2023-06-15 PROCEDURE — 0SGP0JZ FUSION OF RIGHT TOE PHALANGEAL JOINT WITH SYNTHETIC SUBSTITUTE, OPEN APPROACH: ICD-10-PCS | Performed by: PODIATRIST

## 2023-06-15 PROCEDURE — 0QBQ0ZZ EXCISION OF RIGHT TOE PHALANX, OPEN APPROACH: ICD-10-PCS | Performed by: PODIATRIST

## 2023-06-15 PROCEDURE — 28285 REPAIR OF HAMMERTOE: CPT | Performed by: PODIATRIST

## 2023-06-15 PROCEDURE — 28296 COR HLX VLGS DSTL MTAR OSTEO: CPT | Performed by: PODIATRIST

## 2023-06-15 PROCEDURE — 0L8V0ZZ DIVISION OF RIGHT FOOT TENDON, OPEN APPROACH: ICD-10-PCS | Performed by: PODIATRIST

## 2023-06-15 PROCEDURE — 28308 INCISION OF METATARSAL: CPT | Performed by: PODIATRIST

## 2023-06-15 PROCEDURE — 76000 FLUOROSCOPY <1 HR PHYS/QHP: CPT | Performed by: PODIATRIST

## 2023-06-15 RX ORDER — HYDROCODONE BITARTRATE AND ACETAMINOPHEN 5; 325 MG/1; MG/1
1 TABLET ORAL ONCE AS NEEDED
Status: COMPLETED | OUTPATIENT
Start: 2023-06-15 | End: 2023-06-15

## 2023-06-15 RX ORDER — ACETAMINOPHEN 500 MG
1000 TABLET ORAL ONCE AS NEEDED
Status: COMPLETED | OUTPATIENT
Start: 2023-06-15 | End: 2023-06-15

## 2023-06-15 RX ORDER — HYDROMORPHONE HYDROCHLORIDE 1 MG/ML
0.4 INJECTION, SOLUTION INTRAMUSCULAR; INTRAVENOUS; SUBCUTANEOUS EVERY 5 MIN PRN
Status: DISCONTINUED | OUTPATIENT
Start: 2023-06-15 | End: 2023-06-15

## 2023-06-15 RX ORDER — GLYCOPYRROLATE 0.2 MG/ML
INJECTION, SOLUTION INTRAMUSCULAR; INTRAVENOUS AS NEEDED
Status: DISCONTINUED | OUTPATIENT
Start: 2023-06-15 | End: 2023-06-15 | Stop reason: SURG

## 2023-06-15 RX ORDER — HYDROCODONE BITARTRATE AND ACETAMINOPHEN 5; 325 MG/1; MG/1
2 TABLET ORAL ONCE AS NEEDED
Status: COMPLETED | OUTPATIENT
Start: 2023-06-15 | End: 2023-06-15

## 2023-06-15 RX ORDER — DIPHENHYDRAMINE HYDROCHLORIDE 50 MG/ML
12.5 INJECTION INTRAMUSCULAR; INTRAVENOUS AS NEEDED
Status: DISCONTINUED | OUTPATIENT
Start: 2023-06-15 | End: 2023-06-15

## 2023-06-15 RX ORDER — SCOLOPAMINE TRANSDERMAL SYSTEM 1 MG/1
1 PATCH, EXTENDED RELEASE TRANSDERMAL ONCE
Status: DISCONTINUED | OUTPATIENT
Start: 2023-06-15 | End: 2023-06-15 | Stop reason: HOSPADM

## 2023-06-15 RX ORDER — LIDOCAINE HYDROCHLORIDE 10 MG/ML
INJECTION, SOLUTION INFILTRATION; PERINEURAL AS NEEDED
Status: DISCONTINUED | OUTPATIENT
Start: 2023-06-15 | End: 2023-06-15 | Stop reason: HOSPADM

## 2023-06-15 RX ORDER — SODIUM CHLORIDE, SODIUM LACTATE, POTASSIUM CHLORIDE, CALCIUM CHLORIDE 600; 310; 30; 20 MG/100ML; MG/100ML; MG/100ML; MG/100ML
INJECTION, SOLUTION INTRAVENOUS CONTINUOUS
Status: DISCONTINUED | OUTPATIENT
Start: 2023-06-15 | End: 2023-06-15

## 2023-06-15 RX ORDER — PROCHLORPERAZINE EDISYLATE 5 MG/ML
5 INJECTION INTRAMUSCULAR; INTRAVENOUS EVERY 8 HOURS PRN
Status: DISCONTINUED | OUTPATIENT
Start: 2023-06-15 | End: 2023-06-15

## 2023-06-15 RX ORDER — ALBUTEROL SULFATE 2.5 MG/3ML
2.5 SOLUTION RESPIRATORY (INHALATION) AS NEEDED
Status: DISCONTINUED | OUTPATIENT
Start: 2023-06-15 | End: 2023-06-15

## 2023-06-15 RX ORDER — ONDANSETRON 2 MG/ML
4 INJECTION INTRAMUSCULAR; INTRAVENOUS EVERY 6 HOURS PRN
Status: DISCONTINUED | OUTPATIENT
Start: 2023-06-15 | End: 2023-06-15

## 2023-06-15 RX ORDER — HYDROMORPHONE HYDROCHLORIDE 1 MG/ML
0.2 INJECTION, SOLUTION INTRAMUSCULAR; INTRAVENOUS; SUBCUTANEOUS EVERY 5 MIN PRN
Status: DISCONTINUED | OUTPATIENT
Start: 2023-06-15 | End: 2023-06-15

## 2023-06-15 RX ORDER — PHENYLEPHRINE HCL 10 MG/ML
VIAL (ML) INJECTION AS NEEDED
Status: DISCONTINUED | OUTPATIENT
Start: 2023-06-15 | End: 2023-06-15 | Stop reason: SURG

## 2023-06-15 RX ORDER — MIDAZOLAM HYDROCHLORIDE 1 MG/ML
INJECTION INTRAMUSCULAR; INTRAVENOUS AS NEEDED
Status: DISCONTINUED | OUTPATIENT
Start: 2023-06-15 | End: 2023-06-15 | Stop reason: SURG

## 2023-06-15 RX ORDER — BUPIVACAINE HYDROCHLORIDE 5 MG/ML
INJECTION, SOLUTION EPIDURAL; INTRACAUDAL AS NEEDED
Status: DISCONTINUED | OUTPATIENT
Start: 2023-06-15 | End: 2023-06-15 | Stop reason: HOSPADM

## 2023-06-15 RX ORDER — LIDOCAINE HYDROCHLORIDE 10 MG/ML
INJECTION, SOLUTION EPIDURAL; INFILTRATION; INTRACAUDAL; PERINEURAL AS NEEDED
Status: DISCONTINUED | OUTPATIENT
Start: 2023-06-15 | End: 2023-06-15 | Stop reason: SURG

## 2023-06-15 RX ORDER — CEFAZOLIN SODIUM/WATER 2 G/20 ML
2 SYRINGE (ML) INTRAVENOUS ONCE
Status: COMPLETED | OUTPATIENT
Start: 2023-06-15 | End: 2023-06-15

## 2023-06-15 RX ORDER — OXYCODONE HYDROCHLORIDE AND ACETAMINOPHEN 5; 325 MG/1; MG/1
1 TABLET ORAL EVERY 4 HOURS PRN
Qty: 10 TABLET | Refills: 0 | Status: SHIPPED | OUTPATIENT
Start: 2023-06-15

## 2023-06-15 RX ORDER — HYDROMORPHONE HYDROCHLORIDE 1 MG/ML
0.6 INJECTION, SOLUTION INTRAMUSCULAR; INTRAVENOUS; SUBCUTANEOUS EVERY 5 MIN PRN
Status: DISCONTINUED | OUTPATIENT
Start: 2023-06-15 | End: 2023-06-15

## 2023-06-15 RX ORDER — ACETAMINOPHEN 500 MG
1000 TABLET ORAL ONCE
Status: DISCONTINUED | OUTPATIENT
Start: 2023-06-15 | End: 2023-06-15 | Stop reason: HOSPADM

## 2023-06-15 RX ORDER — MIDAZOLAM HYDROCHLORIDE 1 MG/ML
1 INJECTION INTRAMUSCULAR; INTRAVENOUS EVERY 5 MIN PRN
Status: DISCONTINUED | OUTPATIENT
Start: 2023-06-15 | End: 2023-06-15

## 2023-06-15 RX ORDER — NALOXONE HYDROCHLORIDE 0.4 MG/ML
80 INJECTION, SOLUTION INTRAMUSCULAR; INTRAVENOUS; SUBCUTANEOUS AS NEEDED
Status: DISCONTINUED | OUTPATIENT
Start: 2023-06-15 | End: 2023-06-15

## 2023-06-15 RX ADMIN — PHENYLEPHRINE HCL 100 MCG: 10 MG/ML VIAL (ML) INJECTION at 08:09:00

## 2023-06-15 RX ADMIN — PHENYLEPHRINE HCL 100 MCG: 10 MG/ML VIAL (ML) INJECTION at 08:00:00

## 2023-06-15 RX ADMIN — LIDOCAINE HYDROCHLORIDE 40 MG: 10 INJECTION, SOLUTION EPIDURAL; INFILTRATION; INTRACAUDAL; PERINEURAL at 07:39:00

## 2023-06-15 RX ADMIN — PHENYLEPHRINE HCL 100 MCG: 10 MG/ML VIAL (ML) INJECTION at 08:18:00

## 2023-06-15 RX ADMIN — CEFAZOLIN SODIUM/WATER 2 G: 2 G/20 ML SYRINGE (ML) INTRAVENOUS at 07:42:00

## 2023-06-15 RX ADMIN — GLYCOPYRROLATE 0.15 MG: 0.2 INJECTION, SOLUTION INTRAMUSCULAR; INTRAVENOUS at 07:44:00

## 2023-06-15 RX ADMIN — MIDAZOLAM HYDROCHLORIDE 1 MG: 1 INJECTION INTRAMUSCULAR; INTRAVENOUS at 07:38:00

## 2023-06-15 RX ADMIN — SODIUM CHLORIDE, SODIUM LACTATE, POTASSIUM CHLORIDE, CALCIUM CHLORIDE: 600; 310; 30; 20 INJECTION, SOLUTION INTRAVENOUS at 09:02:00

## 2023-06-15 RX ADMIN — PHENYLEPHRINE HCL 100 MCG: 10 MG/ML VIAL (ML) INJECTION at 08:15:00

## 2023-06-15 RX ADMIN — SODIUM CHLORIDE, SODIUM LACTATE, POTASSIUM CHLORIDE, CALCIUM CHLORIDE: 600; 310; 30; 20 INJECTION, SOLUTION INTRAVENOUS at 07:34:00

## 2023-06-15 RX ADMIN — MIDAZOLAM HYDROCHLORIDE 1 MG: 1 INJECTION INTRAMUSCULAR; INTRAVENOUS at 07:34:00

## 2023-06-15 NOTE — BRIEF OP NOTE
Pre-Operative Diagnosis: BUNION; HAMMER TOE RIGHT   overlapping 2nd toe syndrome right foot  Post-Operative Diagnosis: BUNION; HAMMER TOE RIGHT    overlapping 2nd toe syndrome all right foot  Procedure Performed:   First metatarsal osteotomy  Weil 2nd metatarsal osteotomy  Fusion pipj 2nd toe all right foot  Surgeon(s) and Role:     Chris Rubin DPM - Primary    Assistant(s):        Surgical Findings: deformity      Specimen: none     Estimated Blood Loss: Blood Output: 5 mL (6/15/2023  9:08 AM)      Dictation Number:     Cosme Stevens DPM  6/15/2023  9:28 AM

## 2023-06-15 NOTE — INTERVAL H&P NOTE
Pre-op Diagnosis: BUNION; HAMMER TOE RIGHT    The above referenced H&P was reviewed by Enrico Perez. CONSTANTINE Estevez on 6/15/2023, the patient was examined and no significant changes have occurred in the patient's condition since the H&P was performed. I discussed with the patient and/or legal representative the potential benefits, risks and side effects of this procedure; the likelihood of the patient achieving goals; and potential problems that might occur during recuperation. I discussed reasonable alternatives to the procedure, including risks, benefits and side effects related to the alternatives and risks related to not receiving this procedure. We will proceed with procedure as planned.

## 2023-06-15 NOTE — OPERATIVE REPORT
OPERATIVE REPORT     [address]  Patient Status:  Hospital Outpatient Surgery    1965 MRN [id number]   Location [address] [place] [address] Attending No att. providers found   Hosp Day # 0 PCP Anh Tuttle DO     Date of Surgery:  6/15/2023    Preoperative Diagnosis:  Bunion right foot  Hallux abductovalgus right foot  Hammertoe 2nd toe right foot  Overlapping toe syndrome 2nd toe , 2nd mpj right foot    Postoperative Diagnosis: same    Primary Surgeon: Evans Perez DPM    Assistant: none    Procedures:   1. First metatarsal osteotomy with screw fixation right foot  2. Tavera bunionectomy first MP joint right foot  3. Weil second metatarsal osteotomy with screw fixation right foot  4. Fusion PIP joint second digit with phalinx implant per [facility]      Surgical Findings:     Anesthesia: MAC with local    Complications: none    Implants: 4.0 headed screw, 2.5 headed screw, extra small phalinx implant second toe all per [facility]    Specimen: None    Drains: None    Condition: Stable    Estimated Blood Loss: Less than 10 cc    Preoperative history/indications:  Patient presented to Evans Estevez DPM due to painful bunion and hammertoe of her right foot. All preoperative conservative care have failed to resolve the patient's pain and discomfort. The patient would like to proceed with surgery. Informed Consent: All treatment options have been discussed with the patient including both conservative and surgical attempts at correction. Potential risks and complications of surgical intervention were discussed at length including but not limited to death, loss of limb, post op pain, swelling, infection, bleeding, reoccurrence, extended healing,  and the possibility of further and future surgery. No guarantees have been made to the patient and the informed consent has been signed.     Procedure in detail:  The patient was brought into the operating room and placed on the operating table in the supine position. A timeout was called in the presence of the anesthesiologist, circulating nurse, scrub tech, and myself to confirm the correct patient, patient's date of birth, procedure, and location of the procedure to be performed. All present were in agreement. A pneumatic tourniquet was placed about the patient's right ankle. Following IV sedation, a local infiltrative block was administered about the the right foot utilizing 14 ccs of a 1:1 mixture of 1% lidocaine plain and 0.5% marcaine plain. The right foot was then scrubbed, prepped, and draped in the usual aseptic manner. An Esmarch bandage was utilized to exsanguinate the lower extremity and the pneumatic tourniquet was inflated to 250 mmHg where it remained for the duration of the procedure. Attention directed to the first MP joint level of the right foot where a linear incision was made just medial to the extensor hallucis longus tendon. Incision ran from the proximal phalanx of the great toe to the mid portion of the first metatarsal.  Incision was carried through the superficial layers taking care to avoid any neurovascular structures and protecting them out of the way. A capsular incision was made using the full incision Just Medial to the Extensor Tendon. Periosteal tissue freed dorsally and medially. First metatarsal head was noted to have good articular cartilage. With careful retraction the medial eminence of the first metatarsal was removed with a sagittal saw. This was the portion of the Tavera bunionectomy, followed by entering the first interspace and a complete lateral release was performed with the exception of the inner sesamoid ligament. That ends the Tavera bunionectomy.     Attention was then directed to the first metatarsal head where a Babak or V shaped osteotomy was made in the first metatarsal head dorsal cut first through and through from medial to lateral and ending with the plantar cuts. The capital fragment was then transposed about one third the shaft of the first metatarsal and fixated with a 4.0 headed screw 18 mm in length. Irrigation followed and closure of the tissues in layers ending with four-point 0 nylon for skin. Attention now directed to the level of the second MP joint where a linear incision was made and carried to the level of the second MP joint capsule. Care was taken to avoid the extensor tendon apparatus and also any neurovascular structures. With careful retraction the head of the second metatarsal was brought into view and ligamentous structures released laterally and medially. With careful retraction the while or oblique metatarsal osteotomy was made through and through from dorsal distal to plantar proximal.  This relaxed the soft tissues and the capital fragment was released a little proximal and plantar as well as slightly medial for correction. This was then fixated with a cannulated screw, 2.5 headed screw 12 mm in length. Irrigation and closure of this tissues in layers again ending with four-point 0 nylon for skin prior to closure the extensor tendon was lengthened. Next attention directed to the second toe of the right foot where a  hammertoe deformity was noted at the PIP joint level. 2 converging semielliptical incisions were made transversely across the PIP joint level and the skin wedge was excised. Extensor tendon was released and small vessels in the area cauterized. The PIP joint was entered and the head of the proximal phalanx brought into view releasing the ligamentous and capsular tissues. Using the saw the head of the proximal phalanx was removed. Next the phalinx apparatus per [facility] was utilized, The extra small implant was was used and all the correction of these procedures was noted and confirmed on FluoroScan. After irrigation the extensor tendon was repaired and skin was reapproximated with four-point 0 nylon. Good correction of the deformities was noted. The incisions were dressed with xeroform wrap and covered with sterile compressive dressings consisting of gauze, kerlix, and a mildly compressive ACE wrap. The pneumatic ankle tourniquet was then deflated and a prompt hyperemic response was noted to all digits of the right foot. The patient tolerated the procedure and anesthesia well.  she was transferred to the recovery room with VSS and vascular status intact. Following a period of postoperative monitoring, the patient will be discharged home on the following written and oral postop instructions: keep dressings CDI, avoid excessive ambulation, ice and elevate foot when at rest, wear surgical shoe at all times when ambulating, contact my office for all postoperative f/u care and should any problems arise. [employer].  Elliot Alfonso DPM   6/15/2023

## 2023-06-15 NOTE — DISCHARGE INSTRUCTIONS
Elevate foot  Ice ankle area or back of knee area for 15 minutes, three times daily  Do not remove inner bandage. Can remove ace wrap if needed. Weight bearing status - full weight allowed  Take pain meds as directed.    Call for appointment time or check my chart

## 2023-06-16 ENCOUNTER — OFFICE VISIT (OUTPATIENT)
Dept: ORTHOPEDICS CLINIC | Facility: CLINIC | Age: 58
End: 2023-06-16
Payer: MEDICAID

## 2023-06-16 DIAGNOSIS — Z48.89 AFTERCARE FOLLOWING SURGERY: Primary | ICD-10-CM

## 2023-06-16 RX ORDER — HYDROCODONE BITARTRATE AND ACETAMINOPHEN 5; 325 MG/1; MG/1
TABLET ORAL
COMMUNITY
Start: 2023-06-15

## 2023-06-16 NOTE — PROGRESS NOTES
[facility] Progress Note    Subjective:     Patient is here and her  is with her for 1 day postop check. She had a pretty painful night and did get Percocet per on-call Ortho. She is better with just having the bandage off and most like the bandage was too tight. She otherwise is doing pretty well. Objective: It is removed moderate amount of bleeding on bandage incisions and sutures are well coapted right foot. Toes are in good alignment. No drainage. Imaging: No x-rays as of yet, we had Intra-Op FluoroScan        Assessment/Plan:     Diagnoses and all orders for this visit:    Aftercare following surgery  -     XR FOOT, COMPLETE (MIN 3 VIEWS), RIGHT (CPT=73630); Future  -     DME - EXTERNAL         Bandage reapplied. Low-profile boot, and obtain x-ray prior to next visit. She can put weight on the foot but a lot of elevation and rest.  We will see her in 3 weeks            Benedict Patterson. Jovani Stearns DPM  [facility]    [employer]` speech recognition software was used to prepare this note. If a word or phrase is confusing, it is likely do to a failure of recognition. Please contact me with any questions or clarifications.

## 2023-06-19 ENCOUNTER — TELEPHONE (OUTPATIENT)
Dept: NEUROLOGY | Facility: CLINIC | Age: 58
End: 2023-06-19

## 2023-06-19 NOTE — TELEPHONE ENCOUNTER
Rec'd request for all medical records from [facility] on behalf of Pt beginning 9/10/2022 to present. Sent to scanning.

## 2023-06-24 ENCOUNTER — HOSPITAL ENCOUNTER (EMERGENCY)
Facility: HOSPITAL | Age: 58
Discharge: HOME | End: 2023-06-24
Attending: EMERGENCY MEDICINE
Payer: MEDICAID

## 2023-06-24 ENCOUNTER — APPOINTMENT (OUTPATIENT)
Dept: ULTRASOUND IMAGING | Facility: HOSPITAL | Age: 58
End: 2023-06-24
Attending: EMERGENCY MEDICINE
Payer: MEDICAID

## 2023-06-24 ENCOUNTER — APPOINTMENT (OUTPATIENT)
Dept: GENERAL RADIOLOGY | Facility: HOSPITAL | Age: 58
End: 2023-06-24
Attending: EMERGENCY MEDICINE
Payer: MEDICAID

## 2023-06-24 VITALS
WEIGHT: 174 LBS | OXYGEN SATURATION: 98 % | HEART RATE: 65 BPM | DIASTOLIC BLOOD PRESSURE: 96 MMHG | RESPIRATION RATE: 18 BRPM | TEMPERATURE: 98 F | HEIGHT: 64 IN | BODY MASS INDEX: 29.71 KG/M2 | SYSTOLIC BLOOD PRESSURE: 124 MMHG

## 2023-06-24 DIAGNOSIS — M79.89 FOOT SWELLING: Primary | ICD-10-CM

## 2023-06-24 DIAGNOSIS — M79.673 PAIN OF FOOT, UNSPECIFIED LATERALITY: ICD-10-CM

## 2023-06-24 LAB
ALBUMIN SERPL-MCNC: 3.4 G/DL (ref 3.4–5)
ALBUMIN/GLOB SERPL: 1.1 {RATIO} (ref 1–2)
ALP LIVER SERPL-CCNC: 87 U/L
ALT SERPL-CCNC: 19 U/L
ANION GAP SERPL CALC-SCNC: 3 MMOL/L (ref 0–18)
AST SERPL-CCNC: 15 U/L (ref 15–37)
BASOPHILS # BLD AUTO: 0.05 X10(3) UL (ref 0–0.2)
BASOPHILS NFR BLD AUTO: 0.7 %
BILIRUB SERPL-MCNC: 0.3 MG/DL (ref 0.1–2)
BUN BLD-MCNC: 17 MG/DL (ref 7–18)
CALCIUM BLD-MCNC: 8.6 MG/DL (ref 8.5–10.1)
CHLORIDE SERPL-SCNC: 109 MMOL/L (ref 98–112)
CO2 SERPL-SCNC: 27 MMOL/L (ref 21–32)
CREAT BLD-MCNC: 0.9 MG/DL
EOSINOPHIL # BLD AUTO: 0.3 X10(3) UL (ref 0–0.7)
EOSINOPHIL NFR BLD AUTO: 4.4 %
ERYTHROCYTE [DISTWIDTH] IN BLOOD BY AUTOMATED COUNT: 12.3 %
GFR SERPLBLD BASED ON 1.73 SQ M-ARVRAT: 75 ML/MIN/1.73M2 (ref 60–?)
GLOBULIN PLAS-MCNC: 3 G/DL (ref 2.8–4.4)
GLUCOSE BLD-MCNC: 107 MG/DL (ref 70–99)
HCT VFR BLD AUTO: 39.9 %
HGB BLD-MCNC: 13.1 G/DL
IMM GRANULOCYTES # BLD AUTO: 0.07 X10(3) UL (ref 0–1)
IMM GRANULOCYTES NFR BLD: 1 %
LACTATE SERPL-SCNC: 1.7 MMOL/L (ref 0.4–2)
LYMPHOCYTES # BLD AUTO: 1.62 X10(3) UL (ref 1–4)
LYMPHOCYTES NFR BLD AUTO: 23.6 %
MCH RBC QN AUTO: 30 PG (ref 26–34)
MCHC RBC AUTO-ENTMCNC: 32.8 G/DL (ref 31–37)
MCV RBC AUTO: 91.5 FL
MONOCYTES # BLD AUTO: 0.72 X10(3) UL (ref 0.1–1)
MONOCYTES NFR BLD AUTO: 10.5 %
NEUTROPHILS # BLD AUTO: 4.11 X10 (3) UL (ref 1.5–7.7)
NEUTROPHILS # BLD AUTO: 4.11 X10(3) UL (ref 1.5–7.7)
NEUTROPHILS NFR BLD AUTO: 59.8 %
OSMOLALITY SERPL CALC.SUM OF ELEC: 290 MOSM/KG (ref 275–295)
PLATELET # BLD AUTO: 331 10(3)UL (ref 150–450)
POTASSIUM SERPL-SCNC: 4.2 MMOL/L (ref 3.5–5.1)
PROT SERPL-MCNC: 6.4 G/DL (ref 6.4–8.2)
RBC # BLD AUTO: 4.36 X10(6)UL
SODIUM SERPL-SCNC: 139 MMOL/L (ref 136–145)
WBC # BLD AUTO: 6.9 X10(3) UL (ref 4–11)

## 2023-06-24 PROCEDURE — 87040 BLOOD CULTURE FOR BACTERIA: CPT | Performed by: EMERGENCY MEDICINE

## 2023-06-24 PROCEDURE — 80053 COMPREHEN METABOLIC PANEL: CPT | Performed by: EMERGENCY MEDICINE

## 2023-06-24 PROCEDURE — 93971 EXTREMITY STUDY: CPT | Performed by: EMERGENCY MEDICINE

## 2023-06-24 PROCEDURE — 85025 COMPLETE CBC W/AUTO DIFF WBC: CPT | Performed by: EMERGENCY MEDICINE

## 2023-06-24 PROCEDURE — 96374 THER/PROPH/DIAG INJ IV PUSH: CPT

## 2023-06-24 PROCEDURE — 73630 X-RAY EXAM OF FOOT: CPT | Performed by: EMERGENCY MEDICINE

## 2023-06-24 PROCEDURE — 99284 EMERGENCY DEPT VISIT MOD MDM: CPT

## 2023-06-24 PROCEDURE — 99285 EMERGENCY DEPT VISIT HI MDM: CPT

## 2023-06-24 PROCEDURE — 36415 COLL VENOUS BLD VENIPUNCTURE: CPT

## 2023-06-24 PROCEDURE — 83605 ASSAY OF LACTIC ACID: CPT | Performed by: EMERGENCY MEDICINE

## 2023-06-24 PROCEDURE — 96361 HYDRATE IV INFUSION ADD-ON: CPT

## 2023-06-24 RX ORDER — SODIUM CHLORIDE 9 MG/ML
INJECTION, SOLUTION INTRAVENOUS CONTINUOUS
Status: DISCONTINUED | OUTPATIENT
Start: 2023-06-24 | End: 2023-06-24

## 2023-06-24 RX ORDER — CEPHALEXIN 500 MG/1
500 CAPSULE ORAL 4 TIMES DAILY
Qty: 40 CAPSULE | Refills: 0 | Status: SHIPPED | OUTPATIENT
Start: 2023-06-24 | End: 2023-06-24

## 2023-06-24 RX ORDER — CEPHALEXIN 500 MG/1
500 CAPSULE ORAL 4 TIMES DAILY
Qty: 40 CAPSULE | Refills: 0 | Status: SHIPPED | OUTPATIENT
Start: 2023-06-24 | End: 2023-07-04

## 2023-06-24 RX ORDER — CEFAZOLIN SODIUM/WATER 2 G/20 ML
2 SYRINGE (ML) INTRAVENOUS ONCE
Status: COMPLETED | OUTPATIENT
Start: 2023-06-24 | End: 2023-06-24

## 2023-06-24 NOTE — DISCHARGE INSTRUCTIONS
Twice a day, clean gently with rubbing alcohol. Make sure to get all around the wound and then in between the toes. Allow to air dry. Apply antibiotic ointment and nonadherent or adaptic bandage. Keep the area clean, dry, and protected. If Dr. Kaye Haider allows it, start washing the foot gently twice a day, pat dry, and allow to air dry. Finish the antibiotic unless directed otherwise  Return for any severe pain, fever, discharge, swelling, or any other problems.

## 2023-06-24 NOTE — ED QUICK NOTES
Pt has own surgical boot, requested an ace bandage due to her personal one she came in with was thrown out. This writer provided pt with a new ace bandage and supplies to preform wound care at home for the next few days. Advised pt to purchase more.

## 2023-06-26 ENCOUNTER — TELEPHONE (OUTPATIENT)
Dept: ORTHOPEDICS CLINIC | Facility: CLINIC | Age: 58
End: 2023-06-26

## 2023-06-26 NOTE — TELEPHONE ENCOUNTER
Called patient per . No answer on either line so I sent the following "[employer] message;   Good afternoon! I left you a voicemail but figured it may be worth a shot to send a message as well since our office is closed. I am reaching out on behalf of . She wanted me to let you know that she expects you to have pain and swelling because she did a lot during surgery. She wants you to stay off of your foot and elevate as often as possible. She believes you were told to change your dressing in the ED but she actually does NOT want you to change your dressings/bandage. Please leave it alone until your visit on 07/05/23. If you have any questions, please feel free to reply to this message or give our office a call at [phone].   Thank you,   Mattie Rey MA

## 2023-07-05 ENCOUNTER — OFFICE VISIT (OUTPATIENT)
Dept: ORTHOPEDICS CLINIC | Facility: CLINIC | Age: 58
End: 2023-07-05
Payer: MEDICAID

## 2023-07-05 VITALS — BODY MASS INDEX: 29.71 KG/M2 | HEIGHT: 64 IN | WEIGHT: 174 LBS

## 2023-07-05 DIAGNOSIS — Z48.89 AFTERCARE FOLLOWING SURGERY: Primary | ICD-10-CM

## 2023-07-05 DIAGNOSIS — F41.9 ANXIETY: ICD-10-CM

## 2023-07-05 PROCEDURE — 99024 POSTOP FOLLOW-UP VISIT: CPT | Performed by: PODIATRIST

## 2023-07-05 PROCEDURE — 3008F BODY MASS INDEX DOCD: CPT | Performed by: PODIATRIST

## 2023-07-05 RX ORDER — ESCITALOPRAM OXALATE 20 MG/1
20 TABLET ORAL DAILY
Qty: 90 TABLET | Refills: 1 | Status: SHIPPED | OUTPATIENT
Start: 2023-07-05

## 2023-07-05 NOTE — TELEPHONE ENCOUNTER
LOV: 6/1/2023 with Dr. Mihaela Floyd  RTC: 3 months  Last Relevant Labs: 6/24/2023 (CBC/CMP)  Filled: 1/24/2023    #90 with 1 refill    Future Appointments   Date Time Provider Stephane Alleni   7/18/2023  2:00 PM Taj Belcher MD [facility] [address]

## 2023-07-05 NOTE — PROGRESS NOTES
[facility] Progress Note    Subjective:     Donald Rehman is here and her  is with her for follow-up visit. She is almost 3 weeks postop right foot surgery hammertoe and bunion deformity. She had an issue with swelling and discomfort so was seen in the ER on Saturday. Unfortunately, she was told to start doing daily dressing changes and she has wanted to persist with that. I prefer that she did not change the dressing. She has been using her boot for any weightbearing time. Having some swelling but minimal pain        Objective:     Exam right foot incisions are intact sutures intact and were removed. There is a little bit of gapping from the first MP joint incision so Steri-Strips applied. No signs of infection. More about taut or tightness about the foot and limited motion of that second MP joint. Imaging: No x-rays today        Assessment/Plan:     Diagnoses and all orders for this visit:    Aftercare following surgery        Sutures removed without incident and Steri-Strips reapplied. She does have a little gapping which is probably from changing the dressing too often. But Steri-Strips were applied and she can leave those on for 4 to 5 days and then remove. After about 1 week she can get in a bath or pool. We started some range of motion exercises for the first MP joint and encouraging second MP joint plantarflexion. I do not want a lot of gripping as of yet because of the implant in the second digit PIP joint level. After about 6 weeks we may start some therapy but will wait to assess for that in 3 weeks patient can start into her solid type tennis shoe and needs to wear that at all times around the house and outside. I will see her back in about 6 weeks for an x-ray and reevaluation. Consider therapy at that time            Adrien Alpers. Mervat Espana DPM  [facility]    [employer] speech recognition software was used to prepare this note.  If a word or phrase is confusing, it is likely do to a failure of recognition. Please contact me with any questions or clarifications.

## 2023-07-18 ENCOUNTER — OFFICE VISIT (OUTPATIENT)
Dept: NEUROLOGY | Facility: CLINIC | Age: 58
End: 2023-07-18
Payer: MEDICAID

## 2023-07-18 VITALS
DIASTOLIC BLOOD PRESSURE: 82 MMHG | WEIGHT: 174 LBS | SYSTOLIC BLOOD PRESSURE: 144 MMHG | RESPIRATION RATE: 16 BRPM | BODY MASS INDEX: 30 KG/M2 | HEART RATE: 92 BPM

## 2023-07-18 DIAGNOSIS — R51.9 INTRACTABLE EPISODIC HEADACHE, UNSPECIFIED HEADACHE TYPE: ICD-10-CM

## 2023-07-18 DIAGNOSIS — G43.711 INTRACTABLE CHRONIC MIGRAINE WITHOUT AURA AND WITH STATUS MIGRAINOSUS: Primary | ICD-10-CM

## 2023-07-18 PROCEDURE — 3077F SYST BP >= 140 MM HG: CPT | Performed by: OTHER

## 2023-07-18 PROCEDURE — 3079F DIAST BP 80-89 MM HG: CPT | Performed by: OTHER

## 2023-07-18 PROCEDURE — 99215 OFFICE O/P EST HI 40 MIN: CPT | Performed by: OTHER

## 2023-07-18 RX ORDER — ERENUMAB-AOOE 70 MG/ML
70 INJECTION SUBCUTANEOUS
Qty: 1 ML | Refills: 11 | Status: SHIPPED | OUTPATIENT
Start: 2023-07-18 | End: 2024-07-12

## 2023-07-18 RX ORDER — SUMATRIPTAN 50 MG/1
TABLET, FILM COATED ORAL
Qty: 9 TABLET | Refills: 3 | Status: SHIPPED | OUTPATIENT
Start: 2023-07-18

## 2023-07-18 NOTE — PROGRESS NOTES
Pt states here for follow up for dizziness pt states it has been about the same.  Pt denies no recent falls

## 2023-07-25 PROBLEM — G44.89 OTHER HEADACHE SYNDROME: Status: RESOLVED | Noted: 2021-10-11 | Resolved: 2023-07-25

## 2023-07-26 DIAGNOSIS — E66.9 CLASS 1 OBESITY WITHOUT SERIOUS COMORBIDITY WITH BODY MASS INDEX (BMI) OF 30.0 TO 30.9 IN ADULT, UNSPECIFIED OBESITY TYPE: ICD-10-CM

## 2023-07-26 DIAGNOSIS — G43.711 INTRACTABLE CHRONIC MIGRAINE WITHOUT AURA AND WITH STATUS MIGRAINOSUS: ICD-10-CM

## 2023-07-26 RX ORDER — TOPIRAMATE 50 MG/1
50 TABLET, FILM COATED ORAL NIGHTLY
Qty: 90 TABLET | Refills: 0 | Status: SHIPPED | OUTPATIENT
Start: 2023-07-26

## 2023-08-01 ENCOUNTER — TELEPHONE (OUTPATIENT)
Dept: NEUROLOGY | Facility: CLINIC | Age: 58
End: 2023-08-01

## 2023-08-01 NOTE — TELEPHONE ENCOUNTER
Incoming fax from [facility]pharmacy asking for PA on medication. Submitted through [employer]. Will await outcome.

## 2023-08-03 NOTE — TELEPHONE ENCOUNTER
Incoming fax from [facility] with approval on [employer]    Approved 05/03/23-08/01/24  Case# [id number]    Faxed to pharmacy. Left generalized message to call back. Once call is returned, to inform that [facility] is approved.

## 2023-08-08 ENCOUNTER — HOSPITAL ENCOUNTER (OUTPATIENT)
Dept: GENERAL RADIOLOGY | Age: 58
Discharge: HOME | End: 2023-08-08
Attending: PODIATRIST
Payer: MEDICAID

## 2023-08-08 ENCOUNTER — OFFICE VISIT (OUTPATIENT)
Dept: ORTHOPEDICS CLINIC | Facility: CLINIC | Age: 58
End: 2023-08-08
Payer: MEDICAID

## 2023-08-08 VITALS — WEIGHT: 178.63 LBS | HEIGHT: 64 IN | BODY MASS INDEX: 30.5 KG/M2

## 2023-08-08 DIAGNOSIS — M20.5X1 CROSSOVER TOE DEFORMITY OF RIGHT FOOT: ICD-10-CM

## 2023-08-08 DIAGNOSIS — Z48.89 AFTERCARE FOLLOWING SURGERY: ICD-10-CM

## 2023-08-08 DIAGNOSIS — M21.619 BUNION: ICD-10-CM

## 2023-08-08 DIAGNOSIS — M77.50 CAPSULITIS OF METATARSOPHALANGEAL (MTP) JOINT: ICD-10-CM

## 2023-08-08 DIAGNOSIS — Z48.89 AFTERCARE FOLLOWING SURGERY: Primary | ICD-10-CM

## 2023-08-08 PROCEDURE — 73630 X-RAY EXAM OF FOOT: CPT | Performed by: PODIATRIST

## 2023-08-08 PROCEDURE — 3008F BODY MASS INDEX DOCD: CPT | Performed by: PODIATRIST

## 2023-08-08 PROCEDURE — 99024 POSTOP FOLLOW-UP VISIT: CPT | Performed by: PODIATRIST

## 2023-08-08 NOTE — PROGRESS NOTES
[facility] Progress Note    Subjective:     Sonia Hutchison is here for follow-up now 7 weeks post metatarsal osteotomy second metatarsal and first metatarsal osteotomy as well with fusion of the second PIP joint. She is still having some pain, swelling and numbness of the toes. She is in her regular shoe and I did allow her to start that 1 week ago        Objective:     Actually very little swelling today considering 7 weeks postop right foot  Good range of motion wrist MP joint and good alignment of the first and second digits. There is a little early contracture at the second MP joint incision  Toe is rectus though. Mild swelling        Imaging: 3 views right foot today 7 weeks postop -osteotomies close to healing. Hardware intact. Assessment/Plan:     Diagnoses and all orders for this visit:    Aftercare following surgery  -     Cancel: OP REFERRAL TO [facility]  -     XR FOOT WEIGHTBEARING (3 VIEWS), RIGHT   (CPT=73630); Future  -     OP REFERRAL TO [facility]    Crossover toe deformity of right foot  -     Cancel: OP REFERRAL TO [facility]  -     XR FOOT WEIGHTBEARING (3 VIEWS), RIGHT   (CPT=73630); Future  -     OP REFERRAL TO [facility]    Bunion  -     Cancel: OP REFERRAL TO [facility]  -     XR FOOT WEIGHTBEARING (3 VIEWS), RIGHT   (CPT=73630); Future  -     OP REFERRAL TO [facility]    Capsulitis of metatarsophalangeal (MTP) joint  -     Cancel: OP REFERRAL TO [facility]  -     XR FOOT WEIGHTBEARING (3 VIEWS), RIGHT   (CPT=73630); Future  -     OP REFERRAL TO [facility]          start PT for scar tissue, swelling and increased range of motion mainly at the second MP joint as well as the first MP joint. Very little movement of the second digit at the PIP joint level to avoid movement of the implant    Follow-up visit in 6 weeks. Wait on an x-ray at that time. Vic Milton. Quinton Pulliam DPM  [facility]    [employer] speech recognition software was used to prepare this note. If a word or phrase is confusing, it is likely do to a failure of recognition. Please contact me with any questions or clarifications.

## 2023-08-28 ENCOUNTER — OFFICE VISIT (OUTPATIENT)
Dept: INTERNAL MEDICINE CLINIC | Facility: CLINIC | Age: 58
End: 2023-08-28
Payer: MEDICAID

## 2023-08-28 VITALS
DIASTOLIC BLOOD PRESSURE: 68 MMHG | OXYGEN SATURATION: 99 % | HEIGHT: 64 IN | BODY MASS INDEX: 30.87 KG/M2 | SYSTOLIC BLOOD PRESSURE: 124 MMHG | WEIGHT: 180.81 LBS | RESPIRATION RATE: 14 BRPM | HEART RATE: 79 BPM | TEMPERATURE: 98 F

## 2023-08-28 DIAGNOSIS — R30.0 DYSURIA: ICD-10-CM

## 2023-08-28 DIAGNOSIS — E66.9 CLASS 1 OBESITY WITHOUT SERIOUS COMORBIDITY WITH BODY MASS INDEX (BMI) OF 30.0 TO 30.9 IN ADULT, UNSPECIFIED OBESITY TYPE: Primary | ICD-10-CM

## 2023-08-28 PROBLEM — E66.811 CLASS 1 OBESITY WITHOUT SERIOUS COMORBIDITY WITH BODY MASS INDEX (BMI) OF 30.0 TO 30.9 IN ADULT: Status: ACTIVE | Noted: 2023-08-28

## 2023-08-28 LAB
APPEARANCE: CLEAR
BILIRUBIN: NEGATIVE
GLUCOSE (URINE DIPSTICK): NEGATIVE MG/DL
KETONES (URINE DIPSTICK): NEGATIVE MG/DL
LEUKOCYTES: NEGATIVE
MULTISTIX EXPIRATION DATE: ABNORMAL DATE
MULTISTIX LOT#: ABNORMAL NUMERIC
NITRITE, URINE: NEGATIVE
PH, URINE: 7 (ref 4.5–8)
PROTEIN (URINE DIPSTICK): NEGATIVE MG/DL
SPECIFIC GRAVITY: 1.02 (ref 1–1.03)
URINE-COLOR: YELLOW
UROBILINOGEN,SEMI-QN: 0.2 MG/DL (ref 0–1.9)

## 2023-08-28 PROCEDURE — 87086 URINE CULTURE/COLONY COUNT: CPT | Performed by: INTERNAL MEDICINE

## 2023-09-04 ENCOUNTER — HOSPITAL ENCOUNTER (EMERGENCY)
Facility: HOSPITAL | Age: 58
Discharge: HOME | End: 2023-09-04
Attending: EMERGENCY MEDICINE
Payer: MEDICARE

## 2023-09-04 ENCOUNTER — APPOINTMENT (OUTPATIENT)
Dept: CT IMAGING | Facility: HOSPITAL | Age: 58
End: 2023-09-04
Attending: EMERGENCY MEDICINE
Payer: MEDICARE

## 2023-09-04 VITALS
DIASTOLIC BLOOD PRESSURE: 98 MMHG | OXYGEN SATURATION: 100 % | SYSTOLIC BLOOD PRESSURE: 153 MMHG | BODY MASS INDEX: 31.89 KG/M2 | TEMPERATURE: 98 F | RESPIRATION RATE: 18 BRPM | WEIGHT: 180 LBS | HEIGHT: 63 IN | HEART RATE: 56 BPM

## 2023-09-04 DIAGNOSIS — R42 DIZZINESS: Primary | ICD-10-CM

## 2023-09-04 DIAGNOSIS — R51.9 NONINTRACTABLE HEADACHE, UNSPECIFIED CHRONICITY PATTERN, UNSPECIFIED HEADACHE TYPE: ICD-10-CM

## 2023-09-04 LAB
ALBUMIN SERPL-MCNC: 3.7 G/DL (ref 3.4–5)
ALBUMIN/GLOB SERPL: 1.4 {RATIO} (ref 1–2)
ALP LIVER SERPL-CCNC: 103 U/L
ALT SERPL-CCNC: 21 U/L
ANION GAP SERPL CALC-SCNC: 6 MMOL/L (ref 0–18)
AST SERPL-CCNC: 13 U/L (ref 15–37)
BASOPHILS # BLD AUTO: 0.04 X10(3) UL (ref 0–0.2)
BASOPHILS NFR BLD AUTO: 0.6 %
BILIRUB SERPL-MCNC: 0.2 MG/DL (ref 0.1–2)
BUN BLD-MCNC: 12 MG/DL (ref 7–18)
CALCIUM BLD-MCNC: 8.5 MG/DL (ref 8.5–10.1)
CHLORIDE SERPL-SCNC: 110 MMOL/L (ref 98–112)
CO2 SERPL-SCNC: 26 MMOL/L (ref 21–32)
CREAT BLD-MCNC: 0.88 MG/DL
EGFRCR SERPLBLD CKD-EPI 2021: 78 ML/MIN/1.73M2 (ref 60–?)
EOSINOPHIL # BLD AUTO: 0.18 X10(3) UL (ref 0–0.7)
EOSINOPHIL NFR BLD AUTO: 2.6 %
ERYTHROCYTE [DISTWIDTH] IN BLOOD BY AUTOMATED COUNT: 12.6 %
GLOBULIN PLAS-MCNC: 2.7 G/DL (ref 2.8–4.4)
GLUCOSE BLD-MCNC: 100 MG/DL (ref 70–99)
GLUCOSE BLD-MCNC: 105 MG/DL (ref 70–99)
HCT VFR BLD AUTO: 40.2 %
HGB BLD-MCNC: 13.4 G/DL
IMM GRANULOCYTES # BLD AUTO: 0.03 X10(3) UL (ref 0–1)
IMM GRANULOCYTES NFR BLD: 0.4 %
LYMPHOCYTES # BLD AUTO: 1.56 X10(3) UL (ref 1–4)
LYMPHOCYTES NFR BLD AUTO: 22.9 %
MCH RBC QN AUTO: 29.9 PG (ref 26–34)
MCHC RBC AUTO-ENTMCNC: 33.3 G/DL (ref 31–37)
MCV RBC AUTO: 89.7 FL
MONOCYTES # BLD AUTO: 0.63 X10(3) UL (ref 0.1–1)
MONOCYTES NFR BLD AUTO: 9.3 %
NEUTROPHILS # BLD AUTO: 4.36 X10 (3) UL (ref 1.5–7.7)
NEUTROPHILS # BLD AUTO: 4.36 X10(3) UL (ref 1.5–7.7)
NEUTROPHILS NFR BLD AUTO: 64.2 %
OSMOLALITY SERPL CALC.SUM OF ELEC: 294 MOSM/KG (ref 275–295)
PLATELET # BLD AUTO: 280 10(3)UL (ref 150–450)
POTASSIUM SERPL-SCNC: 3.4 MMOL/L (ref 3.5–5.1)
PROT SERPL-MCNC: 6.4 G/DL (ref 6.4–8.2)
RBC # BLD AUTO: 4.48 X10(6)UL
SARS-COV-2 RNA RESP QL NAA+PROBE: NOT DETECTED
SODIUM SERPL-SCNC: 142 MMOL/L (ref 136–145)
WBC # BLD AUTO: 6.8 X10(3) UL (ref 4–11)

## 2023-09-04 PROCEDURE — 85025 COMPLETE CBC W/AUTO DIFF WBC: CPT | Performed by: EMERGENCY MEDICINE

## 2023-09-04 PROCEDURE — 96375 TX/PRO/DX INJ NEW DRUG ADDON: CPT

## 2023-09-04 PROCEDURE — 99284 EMERGENCY DEPT VISIT MOD MDM: CPT

## 2023-09-04 PROCEDURE — 80053 COMPREHEN METABOLIC PANEL: CPT

## 2023-09-04 PROCEDURE — 99285 EMERGENCY DEPT VISIT HI MDM: CPT

## 2023-09-04 PROCEDURE — 85025 COMPLETE CBC W/AUTO DIFF WBC: CPT

## 2023-09-04 PROCEDURE — 82962 GLUCOSE BLOOD TEST: CPT

## 2023-09-04 PROCEDURE — 96374 THER/PROPH/DIAG INJ IV PUSH: CPT

## 2023-09-04 PROCEDURE — 96361 HYDRATE IV INFUSION ADD-ON: CPT

## 2023-09-04 PROCEDURE — 70498 CT ANGIOGRAPHY NECK: CPT | Performed by: EMERGENCY MEDICINE

## 2023-09-04 PROCEDURE — 70496 CT ANGIOGRAPHY HEAD: CPT | Performed by: EMERGENCY MEDICINE

## 2023-09-04 PROCEDURE — 80053 COMPREHEN METABOLIC PANEL: CPT | Performed by: EMERGENCY MEDICINE

## 2023-09-04 RX ORDER — MECLIZINE HYDROCHLORIDE 25 MG/1
25 TABLET ORAL ONCE
Status: COMPLETED | OUTPATIENT
Start: 2023-09-04 | End: 2023-09-04

## 2023-09-04 RX ORDER — KETOROLAC TROMETHAMINE 15 MG/ML
30 INJECTION, SOLUTION INTRAMUSCULAR; INTRAVENOUS ONCE
Status: COMPLETED | OUTPATIENT
Start: 2023-09-04 | End: 2023-09-04

## 2023-09-04 RX ORDER — ONDANSETRON 2 MG/ML
4 INJECTION INTRAMUSCULAR; INTRAVENOUS ONCE
Status: COMPLETED | OUTPATIENT
Start: 2023-09-04 | End: 2023-09-04

## 2023-09-04 RX ORDER — HYDROCODONE BITARTRATE AND ACETAMINOPHEN 5; 325 MG/1; MG/1
1 TABLET ORAL EVERY 6 HOURS PRN
Qty: 10 TABLET | Refills: 0 | Status: SHIPPED | OUTPATIENT
Start: 2023-09-04

## 2023-09-04 RX ORDER — MECLIZINE HYDROCHLORIDE 25 MG/1
25 TABLET ORAL 3 TIMES DAILY PRN
Qty: 12 TABLET | Refills: 0 | Status: SHIPPED | OUTPATIENT
Start: 2023-09-04

## 2023-09-04 RX ORDER — POTASSIUM CHLORIDE 20 MEQ/1
20 TABLET, EXTENDED RELEASE ORAL ONCE
Status: COMPLETED | OUTPATIENT
Start: 2023-09-04 | End: 2023-09-04

## 2023-09-04 RX ORDER — SODIUM CHLORIDE 9 MG/ML
1000 INJECTION, SOLUTION INTRAVENOUS ONCE
Status: COMPLETED | OUTPATIENT
Start: 2023-09-04 | End: 2023-09-04

## 2023-09-04 RX ORDER — METOCLOPRAMIDE HYDROCHLORIDE 5 MG/ML
5 INJECTION INTRAMUSCULAR; INTRAVENOUS ONCE
Status: COMPLETED | OUTPATIENT
Start: 2023-09-04 | End: 2023-09-04

## 2023-09-04 RX ORDER — DIPHENHYDRAMINE HYDROCHLORIDE 50 MG/ML
25 INJECTION INTRAMUSCULAR; INTRAVENOUS ONCE
Status: COMPLETED | OUTPATIENT
Start: 2023-09-04 | End: 2023-09-04

## 2023-09-04 NOTE — DISCHARGE INSTRUCTIONS
REST AT [address] [place] [address] FOR MIGRAINES AT THIS TIME  RETURN TO THE ED IF SYMPTOMS WORSEN OR IF ANY OTHER PROBLEMS ARISE

## 2023-09-04 NOTE — ED QUICK NOTES
Updated on status. Resting comfortable. Return from CT. Will continue to monitor. Warm blankets provided.

## 2023-09-04 NOTE — ED INITIAL ASSESSMENT (HPI)
Pt reports dizziness and headache for 1 week. States symptoms have gotten worse over the past week. Denies any nausea, vomiting, vision changes. Reports she feels like she is spinning even when not moving. States she recently started new migraine medication 2 weeks ago.

## 2023-09-22 ENCOUNTER — HOSPITAL ENCOUNTER (EMERGENCY)
Facility: HOSPITAL | Age: 58
Discharge: HOME | End: 2023-09-22
Attending: EMERGENCY MEDICINE
Payer: MEDICARE

## 2023-09-22 ENCOUNTER — APPOINTMENT (OUTPATIENT)
Dept: GENERAL RADIOLOGY | Facility: HOSPITAL | Age: 58
End: 2023-09-22
Attending: EMERGENCY MEDICINE
Payer: MEDICARE

## 2023-09-22 ENCOUNTER — APPOINTMENT (OUTPATIENT)
Dept: CT IMAGING | Facility: HOSPITAL | Age: 58
End: 2023-09-22
Attending: EMERGENCY MEDICINE
Payer: MEDICARE

## 2023-09-22 VITALS
DIASTOLIC BLOOD PRESSURE: 97 MMHG | BODY MASS INDEX: 30.73 KG/M2 | HEIGHT: 64 IN | SYSTOLIC BLOOD PRESSURE: 108 MMHG | TEMPERATURE: 98 F | OXYGEN SATURATION: 98 % | WEIGHT: 180 LBS | RESPIRATION RATE: 17 BRPM | HEART RATE: 64 BPM

## 2023-09-22 DIAGNOSIS — R05.1 ACUTE COUGH: Primary | ICD-10-CM

## 2023-09-22 DIAGNOSIS — R10.31 COLICKY RLQ ABDOMINAL PAIN: ICD-10-CM

## 2023-09-22 DIAGNOSIS — J06.9 VIRAL URI WITH COUGH: ICD-10-CM

## 2023-09-22 DIAGNOSIS — M54.31 SCIATICA OF RIGHT SIDE: ICD-10-CM

## 2023-09-22 LAB
ALBUMIN SERPL-MCNC: 3.7 G/DL (ref 3.4–5)
ALBUMIN/GLOB SERPL: 1.2 {RATIO} (ref 1–2)
ALP LIVER SERPL-CCNC: 91 U/L
ALT SERPL-CCNC: 21 U/L
ANION GAP SERPL CALC-SCNC: 3 MMOL/L (ref 0–18)
AST SERPL-CCNC: 11 U/L (ref 15–37)
BASOPHILS # BLD AUTO: 0.03 X10(3) UL (ref 0–0.2)
BASOPHILS NFR BLD AUTO: 0.4 %
BILIRUB SERPL-MCNC: 0.2 MG/DL (ref 0.1–2)
BILIRUB UR QL STRIP.AUTO: NEGATIVE
BUN BLD-MCNC: 11 MG/DL (ref 7–18)
CALCIUM BLD-MCNC: 8.7 MG/DL (ref 8.5–10.1)
CHLORIDE SERPL-SCNC: 108 MMOL/L (ref 98–112)
CLARITY UR REFRACT.AUTO: CLEAR
CO2 SERPL-SCNC: 25 MMOL/L (ref 21–32)
CREAT BLD-MCNC: 0.76 MG/DL
EGFRCR SERPLBLD CKD-EPI 2021: 92 ML/MIN/1.73M2 (ref 60–?)
EOSINOPHIL # BLD AUTO: 0.11 X10(3) UL (ref 0–0.7)
EOSINOPHIL NFR BLD AUTO: 1.4 %
ERYTHROCYTE [DISTWIDTH] IN BLOOD BY AUTOMATED COUNT: 13.2 %
GLOBULIN PLAS-MCNC: 3.2 G/DL (ref 2.8–4.4)
GLUCOSE BLD-MCNC: 111 MG/DL (ref 70–99)
GLUCOSE UR STRIP.AUTO-MCNC: NORMAL MG/DL
HCT VFR BLD AUTO: 39.9 %
HGB BLD-MCNC: 13.6 G/DL
IMM GRANULOCYTES # BLD AUTO: 0.06 X10(3) UL (ref 0–1)
IMM GRANULOCYTES NFR BLD: 0.8 %
KETONES UR STRIP.AUTO-MCNC: NEGATIVE MG/DL
LEUKOCYTE ESTERASE UR QL STRIP.AUTO: NEGATIVE
LYMPHOCYTES # BLD AUTO: 1.42 X10(3) UL (ref 1–4)
LYMPHOCYTES NFR BLD AUTO: 18.6 %
MCH RBC QN AUTO: 29.8 PG (ref 26–34)
MCHC RBC AUTO-ENTMCNC: 34.1 G/DL (ref 31–37)
MCV RBC AUTO: 87.5 FL
MONOCYTES # BLD AUTO: 0.62 X10(3) UL (ref 0.1–1)
MONOCYTES NFR BLD AUTO: 8.1 %
NEUTROPHILS # BLD AUTO: 5.39 X10 (3) UL (ref 1.5–7.7)
NEUTROPHILS # BLD AUTO: 5.39 X10(3) UL (ref 1.5–7.7)
NEUTROPHILS NFR BLD AUTO: 70.7 %
NITRITE UR QL STRIP.AUTO: NEGATIVE
OSMOLALITY SERPL CALC.SUM OF ELEC: 282 MOSM/KG (ref 275–295)
PH UR STRIP.AUTO: 7 [PH] (ref 5–8)
PLATELET # BLD AUTO: 279 10(3)UL (ref 150–450)
POTASSIUM SERPL-SCNC: 4.2 MMOL/L (ref 3.5–5.1)
PROT SERPL-MCNC: 6.9 G/DL (ref 6.4–8.2)
PROT UR STRIP.AUTO-MCNC: NEGATIVE MG/DL
RBC # BLD AUTO: 4.56 X10(6)UL
RBC UR QL AUTO: NEGATIVE
SARS-COV-2 RNA RESP QL NAA+PROBE: NOT DETECTED
SODIUM SERPL-SCNC: 136 MMOL/L (ref 136–145)
SP GR UR STRIP.AUTO: 1.02 (ref 1–1.03)
TROPONIN I HIGH SENSITIVITY: 5 NG/L
UROBILINOGEN UR STRIP.AUTO-MCNC: NORMAL MG/DL
WBC # BLD AUTO: 7.6 X10(3) UL (ref 4–11)

## 2023-09-22 PROCEDURE — 71045 X-RAY EXAM CHEST 1 VIEW: CPT | Performed by: EMERGENCY MEDICINE

## 2023-09-22 PROCEDURE — 74176 CT ABD & PELVIS W/O CONTRAST: CPT | Performed by: EMERGENCY MEDICINE

## 2023-09-22 PROCEDURE — 93005 ELECTROCARDIOGRAM TRACING: CPT

## 2023-09-22 PROCEDURE — 80053 COMPREHEN METABOLIC PANEL: CPT | Performed by: EMERGENCY MEDICINE

## 2023-09-22 PROCEDURE — 81003 URINALYSIS AUTO W/O SCOPE: CPT | Performed by: EMERGENCY MEDICINE

## 2023-09-22 PROCEDURE — 85025 COMPLETE CBC W/AUTO DIFF WBC: CPT | Performed by: EMERGENCY MEDICINE

## 2023-09-22 PROCEDURE — 99285 EMERGENCY DEPT VISIT HI MDM: CPT

## 2023-09-22 PROCEDURE — 84484 ASSAY OF TROPONIN QUANT: CPT | Performed by: EMERGENCY MEDICINE

## 2023-09-22 PROCEDURE — 93010 ELECTROCARDIOGRAM REPORT: CPT

## 2023-09-22 PROCEDURE — 96374 THER/PROPH/DIAG INJ IV PUSH: CPT

## 2023-09-22 RX ORDER — TRAMADOL HYDROCHLORIDE 50 MG/1
TABLET ORAL EVERY 6 HOURS PRN
Qty: 10 TABLET | Refills: 0 | Status: SHIPPED | OUTPATIENT
Start: 2023-09-22 | End: 2023-09-27

## 2023-09-22 RX ORDER — ALBUTEROL SULFATE 90 UG/1
2 AEROSOL, METERED RESPIRATORY (INHALATION) EVERY 4 HOURS PRN
Qty: 1 EACH | Refills: 0 | Status: SHIPPED | OUTPATIENT
Start: 2023-09-22 | End: 2023-10-22

## 2023-09-22 RX ORDER — KETOROLAC TROMETHAMINE 15 MG/ML
15 INJECTION, SOLUTION INTRAMUSCULAR; INTRAVENOUS ONCE
Status: COMPLETED | OUTPATIENT
Start: 2023-09-22 | End: 2023-09-22

## 2023-09-22 NOTE — DISCHARGE INSTRUCTIONS
Take ibuprofen 600 mg 3 times a day with food. Pain is poorly controlled can take 1-2 of tramadol. No more than 1-2 every 6 hours. Do not drive on the tramadol. Follow-up with your doctor. Consider MRI of the back if not improving.   If cough persist past 1 more week, consider antibiotics

## 2023-09-24 LAB
ATRIAL RATE: 71 BPM
P AXIS: 44 DEGREES
P-R INTERVAL: 154 MS
Q-T INTERVAL: 448 MS
QRS DURATION: 82 MS
QTC CALCULATION (BEZET): 486 MS
R AXIS: 39 DEGREES
T AXIS: 33 DEGREES
VENTRICULAR RATE: 71 BPM

## 2023-11-22 RX ORDER — TOPIRAMATE 50 MG/1
50 TABLET, FILM COATED ORAL
Refills: 0 | OUTPATIENT
Start: 2023-11-22

## 2023-11-22 RX ORDER — TOPIRAMATE 50 MG/1
50 TABLET, FILM COATED ORAL
COMMUNITY
Start: 2023-08-22

## 2023-11-22 NOTE — TELEPHONE ENCOUNTER
Protocol NONE    Requesting: topiramate 50 MG Oral Tab     LOV: 8/28/23  RTC: 6 months  Filled: 8/22/23  Recent Labs: 9/22/23    Upcoming OV   No future appointments.

## 2023-11-29 ENCOUNTER — TELEPHONE (OUTPATIENT)
Dept: INTERNAL MEDICINE CLINIC | Facility: CLINIC | Age: 58
End: 2023-11-29

## 2023-11-29 NOTE — TELEPHONE ENCOUNTER
Patient called to schedule appointment as she has been experiencing shortness of breath, cough, mild congestion x 3 days. Dr. Elliot Field does not have any appointments until 12/18/23. Please call patient and advise next steps.

## 2023-11-29 NOTE — TELEPHONE ENCOUNTER
Called and spoke to patient, she states since [facility] has no opening she will go to [facility] with worsening symptoms.

## 2023-12-01 ENCOUNTER — HOSPITAL ENCOUNTER (EMERGENCY)
Facility: HOSPITAL | Age: 58
Discharge: HOME | End: 2023-12-01
Attending: EMERGENCY MEDICINE
Payer: MEDICARE

## 2023-12-01 VITALS
SYSTOLIC BLOOD PRESSURE: 155 MMHG | OXYGEN SATURATION: 97 % | HEIGHT: 64 IN | DIASTOLIC BLOOD PRESSURE: 86 MMHG | RESPIRATION RATE: 20 BRPM | TEMPERATURE: 99 F | BODY MASS INDEX: 29.88 KG/M2 | WEIGHT: 175 LBS | HEART RATE: 90 BPM

## 2023-12-01 DIAGNOSIS — J40 BRONCHITIS: Primary | ICD-10-CM

## 2023-12-01 LAB
ALBUMIN SERPL-MCNC: 3.7 G/DL (ref 3.4–5)
ALBUMIN/GLOB SERPL: 1.2 {RATIO} (ref 1–2)
ALP LIVER SERPL-CCNC: 105 U/L
ALT SERPL-CCNC: 12 U/L
ANION GAP SERPL CALC-SCNC: 3 MMOL/L (ref 0–18)
AST SERPL-CCNC: 12 U/L (ref 15–37)
BASOPHILS # BLD AUTO: 0.03 X10(3) UL (ref 0–0.2)
BASOPHILS NFR BLD AUTO: 0.4 %
BILIRUB SERPL-MCNC: 0.1 MG/DL (ref 0.1–2)
BUN BLD-MCNC: 11 MG/DL (ref 9–23)
CALCIUM BLD-MCNC: 8.9 MG/DL (ref 8.5–10.1)
CHLORIDE SERPL-SCNC: 109 MMOL/L (ref 98–112)
CO2 SERPL-SCNC: 30 MMOL/L (ref 21–32)
CREAT BLD-MCNC: 0.98 MG/DL
D DIMER PPP FEU-MCNC: 0.34 UG/ML FEU (ref ?–0.56)
EGFRCR SERPLBLD CKD-EPI 2021: 68 ML/MIN/1.73M2 (ref 60–?)
EOSINOPHIL # BLD AUTO: 0.12 X10(3) UL (ref 0–0.7)
EOSINOPHIL NFR BLD AUTO: 1.7 %
ERYTHROCYTE [DISTWIDTH] IN BLOOD BY AUTOMATED COUNT: 12.6 %
FLUAV + FLUBV RNA SPEC NAA+PROBE: NEGATIVE
FLUAV + FLUBV RNA SPEC NAA+PROBE: NEGATIVE
GLOBULIN PLAS-MCNC: 3.2 G/DL (ref 2.8–4.4)
GLUCOSE BLD-MCNC: 78 MG/DL (ref 70–99)
HCT VFR BLD AUTO: 42.7 %
HGB BLD-MCNC: 14.2 G/DL
IMM GRANULOCYTES # BLD AUTO: 0.03 X10(3) UL (ref 0–1)
IMM GRANULOCYTES NFR BLD: 0.4 %
LYMPHOCYTES # BLD AUTO: 1.89 X10(3) UL (ref 1–4)
LYMPHOCYTES NFR BLD AUTO: 27.3 %
MCH RBC QN AUTO: 29.8 PG (ref 26–34)
MCHC RBC AUTO-ENTMCNC: 33.3 G/DL (ref 31–37)
MCV RBC AUTO: 89.7 FL
MONOCYTES # BLD AUTO: 0.65 X10(3) UL (ref 0.1–1)
MONOCYTES NFR BLD AUTO: 9.4 %
NEUTROPHILS # BLD AUTO: 4.2 X10 (3) UL (ref 1.5–7.7)
NEUTROPHILS # BLD AUTO: 4.2 X10(3) UL (ref 1.5–7.7)
NEUTROPHILS NFR BLD AUTO: 60.8 %
NT-PROBNP SERPL-MCNC: 100 PG/ML (ref ?–125)
OSMOLALITY SERPL CALC.SUM OF ELEC: 292 MOSM/KG (ref 275–295)
PLATELET # BLD AUTO: 317 10(3)UL (ref 150–450)
POTASSIUM SERPL-SCNC: 3.3 MMOL/L (ref 3.5–5.1)
PROT SERPL-MCNC: 6.9 G/DL (ref 6.4–8.2)
RBC # BLD AUTO: 4.76 X10(6)UL
RSV RNA SPEC NAA+PROBE: NEGATIVE
SARS-COV-2 RNA RESP QL NAA+PROBE: NOT DETECTED
SODIUM SERPL-SCNC: 142 MMOL/L (ref 136–145)
TROPONIN I SERPL HS-MCNC: 9 NG/L
WBC # BLD AUTO: 6.9 X10(3) UL (ref 4–11)

## 2023-12-01 PROCEDURE — 99284 EMERGENCY DEPT VISIT MOD MDM: CPT

## 2023-12-01 PROCEDURE — 83880 ASSAY OF NATRIURETIC PEPTIDE: CPT | Performed by: EMERGENCY MEDICINE

## 2023-12-01 PROCEDURE — 85025 COMPLETE CBC W/AUTO DIFF WBC: CPT | Performed by: EMERGENCY MEDICINE

## 2023-12-01 PROCEDURE — 0241U SARS-COV-2/FLU A AND B/RSV BY PCR (GENEXPERT): CPT | Performed by: EMERGENCY MEDICINE

## 2023-12-01 PROCEDURE — 80053 COMPREHEN METABOLIC PANEL: CPT | Performed by: EMERGENCY MEDICINE

## 2023-12-01 PROCEDURE — 85379 FIBRIN DEGRADATION QUANT: CPT | Performed by: EMERGENCY MEDICINE

## 2023-12-01 PROCEDURE — 36415 COLL VENOUS BLD VENIPUNCTURE: CPT

## 2023-12-01 PROCEDURE — 94640 AIRWAY INHALATION TREATMENT: CPT

## 2023-12-01 PROCEDURE — 84484 ASSAY OF TROPONIN QUANT: CPT | Performed by: EMERGENCY MEDICINE

## 2023-12-01 PROCEDURE — 0241U SARS-COV-2/FLU A AND B/RSV BY PCR (GENEXPERT): CPT

## 2023-12-01 PROCEDURE — 93005 ELECTROCARDIOGRAM TRACING: CPT

## 2023-12-01 PROCEDURE — 93010 ELECTROCARDIOGRAM REPORT: CPT

## 2023-12-01 RX ORDER — BENZONATATE 100 MG/1
100 CAPSULE ORAL 3 TIMES DAILY PRN
Qty: 30 CAPSULE | Refills: 0 | Status: SHIPPED | OUTPATIENT
Start: 2023-12-01 | End: 2023-12-31

## 2023-12-01 RX ORDER — IPRATROPIUM BROMIDE AND ALBUTEROL SULFATE 2.5; .5 MG/3ML; MG/3ML
3 SOLUTION RESPIRATORY (INHALATION) ONCE
Status: COMPLETED | OUTPATIENT
Start: 2023-12-01 | End: 2023-12-01

## 2023-12-01 RX ORDER — MECLIZINE HYDROCHLORIDE 25 MG/1
25 TABLET ORAL 3 TIMES DAILY PRN
Qty: 20 TABLET | Refills: 0 | Status: SHIPPED | OUTPATIENT
Start: 2023-12-01 | End: 2023-12-01

## 2023-12-01 RX ORDER — ALBUTEROL SULFATE 90 UG/1
2 AEROSOL, METERED RESPIRATORY (INHALATION) EVERY 4 HOURS PRN
Qty: 1 EACH | Refills: 0 | Status: SHIPPED | OUTPATIENT
Start: 2023-12-01 | End: 2023-12-31

## 2023-12-01 NOTE — DISCHARGE INSTRUCTIONS
Use the Tessalon for coughing albuterol 2 puffs every 4 hours as needed for difficulty breathing. Return for worsening symptoms over-the-counter cough or cold medications return if worse stop smoking.

## 2023-12-01 NOTE — ED INITIAL ASSESSMENT (HPI)
Patient presents with c/o overall complaint of not feeling well for several weeks  URI, body aches, bumps in scalps, chills congestion. Patient initially refusing to wear mask.

## 2023-12-02 LAB
ATRIAL RATE: 75 BPM
P AXIS: 39 DEGREES
P-R INTERVAL: 150 MS
Q-T INTERVAL: 428 MS
QRS DURATION: 90 MS
QTC CALCULATION (BEZET): 477 MS
R AXIS: 29 DEGREES
T AXIS: 30 DEGREES
VENTRICULAR RATE: 75 BPM

## 2024-01-03 ENCOUNTER — LAB ENCOUNTER (OUTPATIENT)
Dept: LAB | Age: 59
End: 2024-01-03
Attending: INTERNAL MEDICINE
Payer: MEDICARE

## 2024-01-03 PROBLEM — F17.200 CURRENT EVERY DAY SMOKER: Status: RESOLVED | Noted: 2023-04-26 | Resolved: 2024-01-03

## 2024-01-03 PROBLEM — R20.0 NUMBNESS OF TOES: Status: RESOLVED | Noted: 2023-04-26 | Resolved: 2024-01-03

## 2024-01-03 PROBLEM — F33.9 DEPRESSION, RECURRENT: Status: RESOLVED | Noted: 2024-01-03 | Resolved: 2024-01-03

## 2024-01-03 PROBLEM — K21.9 GASTROESOPHAGEAL REFLUX DISEASE: Status: ACTIVE | Noted: 2024-01-03

## 2024-01-03 PROBLEM — M21.619 BUNION: Status: RESOLVED | Noted: 2023-04-26 | Resolved: 2024-01-03

## 2024-01-03 PROBLEM — M20.5X1 CROSSOVER TOE DEFORMITY OF RIGHT FOOT: Status: RESOLVED | Noted: 2023-04-26 | Resolved: 2024-01-03

## 2024-01-03 PROBLEM — F33.9 DEPRESSION, RECURRENT: Status: ACTIVE | Noted: 2024-01-03

## 2024-01-03 PROBLEM — M20.41 HAMMER TOE OF RIGHT FOOT: Status: RESOLVED | Noted: 2023-04-26 | Resolved: 2024-01-03

## 2024-01-03 PROBLEM — F33.9 DEPRESSION, RECURRENT (HCC): Status: ACTIVE | Noted: 2024-01-03

## 2024-01-03 PROBLEM — F33.9 DEPRESSION, RECURRENT (HCC): Status: RESOLVED | Noted: 2024-01-03 | Resolved: 2024-01-03

## 2024-01-03 PROCEDURE — 84443 ASSAY THYROID STIM HORMONE: CPT | Performed by: INTERNAL MEDICINE

## 2024-01-03 PROCEDURE — 36415 COLL VENOUS BLD VENIPUNCTURE: CPT | Performed by: INTERNAL MEDICINE

## 2024-01-03 PROCEDURE — 80076 HEPATIC FUNCTION PANEL: CPT | Performed by: INTERNAL MEDICINE

## 2024-01-15 ENCOUNTER — TELEPHONE (OUTPATIENT)
Dept: INTERNAL MEDICINE CLINIC | Facility: CLINIC | Age: 59
End: 2024-01-15

## 2024-01-15 NOTE — TELEPHONE ENCOUNTER
Patient called office to review recent lab results.  Most recent lab results from 1/3/24 along with [facility]'s comments/recommendations shared.  Patient verbalized understanding.  Denies further questions.

## 2024-01-17 ENCOUNTER — TELEPHONE (OUTPATIENT)
Dept: INTERNAL MEDICINE CLINIC | Facility: CLINIC | Age: 59
End: 2024-01-17

## 2024-01-17 NOTE — TELEPHONE ENCOUNTER
Patient called to inform [facility] called her to state the MRI of the Spine that was ordered does not contain enough information as to the reasoning why this is needed. Patient was instructed by Coby to contact [facility] office to inform of this and office needs to contact [facility] to give missing information.    Please call and advise.  Coby  [phone]     MRI SPINE CERVICAL (CPT=72141) ([id number]) [[id number]] (Order [id number])    Patient requesting call as well once resolved with insurance.
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
## 2024-01-19 NOTE — TELEPHONE ENCOUNTER
Called [facility] the number provided by the yme team Fax   Phone [phone]    Spoke to Jonathan QUEZADA, physician support, per Jonathan case is ineligible for P2P and clinical notes would not be accepted. In order to get P2P would have to wait 60 days.     Per Jonathan an appeal can be filed to overturn decision. [phone]

## 2024-01-22 NOTE — TELEPHONE ENCOUNTER
Please let patient know about the decision. Unable to perform peer to peer at this time  Anh Rosado DO

## 2024-02-01 ENCOUNTER — TELEPHONE (OUTPATIENT)
Dept: NEUROLOGY | Facility: CLINIC | Age: 59
End: 2024-02-01

## 2024-02-01 ENCOUNTER — OFFICE VISIT (OUTPATIENT)
Dept: NEUROLOGY | Facility: CLINIC | Age: 59
End: 2024-02-01
Payer: MEDICARE

## 2024-02-01 VITALS — DIASTOLIC BLOOD PRESSURE: 80 MMHG | RESPIRATION RATE: 16 BRPM | HEART RATE: 84 BPM | SYSTOLIC BLOOD PRESSURE: 128 MMHG

## 2024-02-01 DIAGNOSIS — R51.9 INTRACTABLE EPISODIC HEADACHE, UNSPECIFIED HEADACHE TYPE: ICD-10-CM

## 2024-02-01 DIAGNOSIS — M54.2 NECK PAIN: ICD-10-CM

## 2024-02-01 DIAGNOSIS — R42 DIZZINESS: ICD-10-CM

## 2024-02-01 DIAGNOSIS — G43.711 INTRACTABLE CHRONIC MIGRAINE WITHOUT AURA AND WITH STATUS MIGRAINOSUS: Primary | ICD-10-CM

## 2024-02-01 RX ORDER — SUMATRIPTAN 50 MG/1
TABLET, FILM COATED ORAL
Qty: 9 TABLET | Refills: 3 | Status: SHIPPED | OUTPATIENT
Start: 2024-02-01

## 2024-02-01 RX ORDER — MECLIZINE HYDROCHLORIDE 25 MG/1
25 TABLET ORAL 3 TIMES DAILY PRN
Qty: 30 TABLET | Refills: 0 | Status: SHIPPED | OUTPATIENT
Start: 2024-02-01

## 2024-02-01 RX ORDER — LEVETIRACETAM 500 MG/1
500 TABLET ORAL 2 TIMES DAILY
Qty: 180 TABLET | Refills: 3 | Status: SHIPPED | OUTPATIENT
Start: 2024-02-01

## 2024-02-01 NOTE — TELEPHONE ENCOUNTER
Pt in office for follow up regarding migraines.     Per pt, was on Aimovig under previous insurance, no longer has. Has been without medication for some time but was noting some improvement.     Spoke with Dr. Luque, to try [facility] for medication again under new insurance.     Submitted through Epic. Will await outcome.
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
## 2024-02-01 NOTE — PATIENT INSTRUCTIONS
Refill policies:    Allow 2-3 business days for refills; controlled substances may take longer.  Contact your pharmacy at least 5 days prior to running out of medication and have them send an electronic request or submit request through the “request refill” option in your [employer] account.  Refills are not addressed on weekends; covering physicians do not authorize routine medications on weekends.  No narcotics or controlled substances are refilled after noon on Fridays or by on call physicians.  By law, narcotics must be electronically prescribed.  A 30 day supply with no refills is the maximum allowed.  If your prescription is due for a refill, you may be due for a follow up appointment.  To best provide you care, patients receiving routine medications need to be seen at least once a year.  Patients receiving narcotic/controlled substance medications need to be seen at least once every 3 months.  In the event that your preferred pharmacy does not have the requested medication in stock (e.g. Backordered), it is your responsibility to find another pharmacy that has the requested medication available.  We will gladly send a new prescription to that pharmacy at your request.    Scheduling Tests:    If your physician has ordered radiology tests such as MRI or CT scans, please contact Central Scheduling at [phone] right away to schedule the test.  Once scheduled, the [facility] Centralized Referral Team will work with your insurance carrier to obtain pre-certification or prior authorization.  Depending on your insurance carrier, approval may take 3-10 days.  It is highly recommended patients assure they have received an authorization before having a test performed.  If test is done without insurance authorization, patient may be responsible for the entire amount billed.      Precertification and Prior Authorizations:  If your physician has recommended that you have a procedure or additional testing performed the [facility]  Centralized Referral Team will contact your insurance carrier to obtain pre-certification or prior authorization.    You are strongly encouraged to contact your insurance carrier to verify that your procedure/test has been approved and is a COVERED benefit.  Although the [facility] Centralized Referral Team does its due diligence, the insurance carrier gives the disclaimer that \"Although the procedure is authorized, this does not guarantee payment.\"    Ultimately the patient is responsible for payment.   Thank you for your understanding in this matter.  Paperwork Completion:  If you require FMLA or disability paperwork for your recovery, please make sure to either drop it off or have it faxed to our office at [phone]. Be sure the form has your name and date of birth on it.  The form will be faxed to our Forms Department and they will complete it for you.  There is a 25$ fee for all forms that need to be filled out.  Please be aware there is a 10-14 day turnaround time.  You will need to sign a release of information (DOMINGA) form if your paperwork does not come with one.  You may call the Forms Department with any questions at [phone].  Their fax number is [phone].

## 2024-02-05 ENCOUNTER — OFFICE VISIT (OUTPATIENT)
Dept: INTERNAL MEDICINE CLINIC | Facility: CLINIC | Age: 59
End: 2024-02-05
Payer: MEDICARE

## 2024-02-05 VITALS
TEMPERATURE: 98 F | BODY MASS INDEX: 32.96 KG/M2 | RESPIRATION RATE: 14 BRPM | HEIGHT: 63 IN | SYSTOLIC BLOOD PRESSURE: 150 MMHG | WEIGHT: 186 LBS | DIASTOLIC BLOOD PRESSURE: 100 MMHG | OXYGEN SATURATION: 96 % | HEART RATE: 64 BPM

## 2024-02-05 DIAGNOSIS — E66.9 CLASS 1 OBESITY WITHOUT SERIOUS COMORBIDITY WITH BODY MASS INDEX (BMI) OF 30.0 TO 30.9 IN ADULT, UNSPECIFIED OBESITY TYPE: ICD-10-CM

## 2024-02-05 DIAGNOSIS — Z12.31 BREAST CANCER SCREENING BY MAMMOGRAM: ICD-10-CM

## 2024-02-05 DIAGNOSIS — I10 PRIMARY HYPERTENSION: Primary | ICD-10-CM

## 2024-02-05 DIAGNOSIS — F41.9 ANXIETY: ICD-10-CM

## 2024-02-05 DIAGNOSIS — K50.10 CROHN'S DISEASE OF COLON WITHOUT COMPLICATION (HCC): ICD-10-CM

## 2024-02-05 PROBLEM — M77.50 CAPSULITIS OF METATARSOPHALANGEAL (MTP) JOINT: Status: RESOLVED | Noted: 2023-04-26 | Resolved: 2024-02-05

## 2024-02-05 PROCEDURE — 3008F BODY MASS INDEX DOCD: CPT | Performed by: INTERNAL MEDICINE

## 2024-02-05 PROCEDURE — 3077F SYST BP >= 140 MM HG: CPT | Performed by: INTERNAL MEDICINE

## 2024-02-05 PROCEDURE — 99214 OFFICE O/P EST MOD 30 MIN: CPT | Performed by: INTERNAL MEDICINE

## 2024-02-05 PROCEDURE — 3080F DIAST BP >= 90 MM HG: CPT | Performed by: INTERNAL MEDICINE

## 2024-02-05 RX ORDER — ESCITALOPRAM OXALATE 20 MG/1
TABLET ORAL
Qty: 90 TABLET | Refills: 1 | Status: SHIPPED | OUTPATIENT
Start: 2024-02-05 | End: 2024-02-05

## 2024-02-05 RX ORDER — ESCITALOPRAM OXALATE 20 MG/1
TABLET ORAL
Qty: 90 TABLET | Refills: 1 | Status: SHIPPED | OUTPATIENT
Start: 2024-02-05

## 2024-02-05 RX ORDER — ENALAPRIL MALEATE 10 MG/1
10 TABLET ORAL NIGHTLY
Qty: 90 TABLET | Refills: 0 | Status: SHIPPED | OUTPATIENT
Start: 2024-02-05

## 2024-02-05 RX ORDER — TIRZEPATIDE 2.5 MG/.5ML
2.5 INJECTION, SOLUTION SUBCUTANEOUS WEEKLY
Qty: 1 ML | Refills: 0 | Status: SHIPPED | OUTPATIENT
Start: 2024-02-05

## 2024-02-05 NOTE — PATIENT INSTRUCTIONS
Start the blood pressure medication at night time. Come in for your kidney blood test in 2 weeks    See me back in 1 month for blood pressure check    Continue your lexapro    Schedule your mammogram    Lets see if zepbound is covered for your insurance.  You had mentioned no history of medullary thyroid cancer in your family nor personal history of pancreatitis.  The medication may cause nausea, vomiting or constipation/diarrhea.    I have placed a referral to the GI specialist as well.

## 2024-02-05 NOTE — PROGRESS NOTES
Patient Office Visit    ASSESSMENT AND PLAN:   1. Primary hypertension  Note: Will start enalapril.  Discussed with patient that weight loss will help decrease her blood pressure.  Discussed the side effects and will recheck labs in 2 weeks.  - Basic Metabolic Panel (8) [E]; Future    2. Class 1 obesity without serious comorbidity with body mass index (BMI) of 30.0 to 30.9 in adult, unspecified obesity type  Note: Will see if the medication below is approved.  Denies any personal or family history of medullary thyroid cancer or pancreatitis.  Discussed the side effects of nausea, vomiting, diarrhea or constipation.  If the medication is approved patient will schedule an appointment with the nurse to learn how to use it.  - Tirzepatide-Weight Management (ZEPBOUND) 2.5 MG/0.5ML Subcutaneous Solution Auto-injector; Inject 2.5 mg into the skin once a week.  Dispense: 1 mL; Refill: 0    3. Breast cancer screening by mammogram  - [facility] IQRA 2D+3D SCREENING BILAT (CPT=77067/14329); Future    4. Anxiety  Note: Continue Lexapro.  - escitalopram (LEXAPRO) 20 MG Oral Tab; Take 1 tablet daily dispense: 90 tablet; Refill: 1    5. Crohn's disease of colon without complication (HCC)  Note: Patient is having some acid reflux symptoms which could be from her increased weight.  She will continue pantoprazole.  She is also due to follow-up with the GI doctor and referral has been placed.  - Gastro Referral - In Network    Return to clinic in 1 month for blood pressure check      Patient/Caregiver Education: Patient/Caregiver Education: There are no barriers to learning. Medical education done. Outcome: Patient verbalizes understanding. Patient is notified to call with any questions, complications, allergies, or worsening or changing symptoms.  Patient is to call with any side effects or complications from the treatments as a result of today.      Reviewed Past Medical History and   Patient Active Problem List   Diagnosis    Closed  fracture of shaft of metacarpal bone(s)    Cognitive deficits    Crohn's disease of colon without complication (HCC)    Seizure (HCC)    Anxiety    Raynaud's phenomenon without gangrene    Excessive sleepiness    Intractable chronic migraine without aura and with status migrainosus    Class 1 obesity without serious comorbidity with body mass index (BMI) of 30.0 to 30.9 in adult    Gastroesophageal reflux disease    Primary hypertension       Orders Placed This Encounter   Procedures    Basic Metabolic Panel (8) [E]     Standing Status:   Future     Standing Expiration Date:   2/4/2025     Order Specific Question:   Release to patient     Answer:   Immediate     Requested Prescriptions     Signed Prescriptions Disp Refills    Tirzepatide-Weight Management (ZEPBOUND) 2.5 MG/0.5ML Subcutaneous Solution Auto-injector 1 mL 0     Sig: Inject 2.5 mg into the skin once a week.    escitalopram (LEXAPRO) 20 MG Oral Tab 90 tablet 1     Sig: Take half a tablet for 3 weeks the increase to one full tablet    enalapril 10 MG Oral Tab 90 tablet 0     Sig: Take 1 tablet (10 mg total) by mouth at bedtime.         Anh Rosado DO  CC:  Chief Complaint   Patient presents with    Follow - Up     1 month         HPI:   Leila Alarcon is a 58-year-old female who presents for blood pressure check    Hypertension: She has not been checking her blood pressure and denies any symptoms.  She does states she had high blood pressure many years ago for which she was on medication and she is open to taking it again for short time.  Obesity: She does eat a lot of chocolates especially bases and is trying to cut that down.  She is hoping to see if any medication is approved for weight loss.  She is trying to work out more as well.  Anxiety: Controlled on Lexapro and she will be going on a cruise soon and is very excited about that.    Past Medical History:   Diagnosis Date    Anxiety state     Back problem     DEGENERATIVE DISC     Crohn disease (HCC)     Depression     Diverticulosis of large intestine     Dizziness     Migraines     Seizure disorder (HCC)        Past Surgical History:   Procedure Laterality Date    BUNIONECTOMY Right 2023    COLON SURGERY      partial resection    HERNIA REPAIR      RONDA LOCALIZATION WIRE 1 SITE LEFT (CPT=19281) Left     benign , several yrs ago    REMOVAL GALLBLADDER      REPAIR OF HAMMERTOE,ONE Right 2023    WRIST FRACTURE SURGERY      plate and screws  left       Social History:  Social History     Socioeconomic History    Marital status: Single   Tobacco Use    Smoking status: Every Day     Packs/day: .5     Types: Cigarettes     Start date: 1/1/1981    Smokeless tobacco: Never   Vaping Use    Vaping Use: Never used   Substance and Sexual Activity    Alcohol use: Not Currently    Drug use: Never   Other Topics Concern    Caffeine Concern No    Exercise Yes     Comment: walking     Family History:  Family History   Family history unknown: Yes     Allergies:  Allergies   Allergen Reactions    Dust Mite Extract OTHER (SEE COMMENTS)    Seasonal OTHER (SEE COMMENTS)     Runny nose     Current Meds:  Current Outpatient Medications on File Prior to Visit   Medication Sig Dispense Refill    levETIRAcetam 500 MG Oral Tab Take 1 tablet (500 mg total) by mouth 2 (two) times daily. 180 tablet 3    SUMAtriptan 50 MG Oral Tab TAKE 1 TABLET BY MOUTH AT ONSET OF HEADACHE. MAY REPEAT ONCE AFTER 2 HOURS- TAKE MAXIMUM OF 2 TABLETS PER DAY 9 tablet 3    meclizine 25 MG Oral Tab Take 1 tablet (25 mg total) by mouth 3 (three) times daily as needed. 30 tablet 0    ALPRAZolam (XANAX) 0.5 MG Oral Tab Take 1 tablet (0.5 mg total) by mouth nightly as needed. 30 tablet 0    pantoprazole 40 MG Oral Tab EC Take 1 tablet (40 mg total) by mouth before breakfast. 90 tablet 1    budesonide 3 MG Oral Cap DR Particles Take 1 capsule (3 mg total) by mouth 3 (three) times daily before meals as needed. 180 capsule 1    gabapentin 300 MG Oral  Cap Take 1 capsule (300 mg total) by mouth nightly. (Patient taking differently: Take 2 capsules (600 mg total) by mouth nightly.) 90 capsule 3    erenumab-aooe (AIMOVIG) 70 MG/ML Subcutaneous Inject 1 mL (70 mg total) into the skin every 30 (thirty) days. (Patient not taking: Reported on 1/3/2024) 1 mL 11     Current Facility-Administered Medications on File Prior to Visit   Medication Dose Route Frequency Provider Last Rate Last Admin    cyanocobalamin (Vitamin B12) 1000 MCG/ML injection 1,000 mcg  1,000 mcg Intramuscular Q30 Days Anh Rosado, DO   1,000 mcg at 04/14/23 1230         REVIEW OF SYSTEMS   Constitutional: no fatigue normal energy no weight changes   HENT: normal sinuses and no mouth issues   Eyes: . normal vision no eye pain   Respiratory: normal respirations no cough   Cardiovascular: no CP, or palpitations   Gastrointestinal: normal bowels and no abd pains   Genitourinary:  normal urination no hematuria, no frequency   Musculoskeletal: no pains in arms/legs, normal range of motion   Skin: no rashes or skin lesions that are new   Neurological:  no weakness, no numbness, normal gait   Hematological:  no bruises or bleeding   Psychiatric/Behavioral: normal mood no anxiety normal behavior     BP (!) 150/100 (BP Location: Right arm, Patient Position: Sitting, Cuff Size: adult)   Pulse 64   Temp 97.9 °F (36.6 °C) (Temporal)   Resp 14   Ht 5' 3\" (1.6 m)   Wt 186 lb (84.4 kg)   LMP  (LMP Unknown)   SpO2 96%   BMI 32.95 kg/m²     PHYSICAL EXAM:   Constitutional: Vital signs reviewed as noted, well developed, in no acute distress.   HENT: NCAT  Eyes: pupils reactive bilaterally  Cardiovascular: nl s1 s2 no m/r/g  Pulmonary/Chest: CTA bilaterally with no wheezes  Extremities: no pedal edema   Neurological:  no weakness in UE and LE, reflexes are normal  Skin: no rashes or bruises on visualized skin, not undressed   Psychiatric:normal mood

## 2024-02-06 NOTE — TELEPHONE ENCOUNTER
Per referral info:    Authorization number: [id number];   Authorized from January 2, 2024 to January 31, 2025   Information entered manually    Left message on pharmacy VM regarding approval information.     Informed pt. Verbalized understanding

## 2024-02-26 ENCOUNTER — TELEPHONE (OUTPATIENT)
Dept: NEUROLOGY | Facility: CLINIC | Age: 59
End: 2024-02-26

## 2024-02-26 NOTE — TELEPHONE ENCOUNTER
Patient is calling stating she was told by he insurance they need more information on why she's getting done the MRI Spine Cervical. Please advice, autumn phone number: [phone] claim # [id number]

## 2024-03-01 NOTE — TELEPHONE ENCOUNTER
Incoming IB message received from Rossana Robertson RN requesting follow up     MRI SPINE CERVICAL (CPT=72141     Exam scheduled 03/17/2024     Case denied under case # [id number] , refer to attachment for appeal options. A copy of the letter is under the Media tab for review     For further discussion Call [facility] at [phone]

## 2024-03-01 NOTE — TELEPHONE ENCOUNTER
Called number below, initially spoke with Judy who explained that MRI Cervical was denied due to them NOT receiving any clinical information.     No option to submit the information. Need to appeal now.     Was transferred to appeal dept, spoke with Elisabeth. Can do fast appeal over the phone. Relayed that per LOV, symptoms not improving with conservative therapy and medication. Therapy within the last 3 months and was for 6 weeks.     Needs to submit clinical info to support MRI    Faxed LOV to [phone]    Ref#[id number]    Confirmation received    Time on call: 24:53    Decision within 72 hours

## 2024-03-01 NOTE — TELEPHONE ENCOUNTER
Call transferred from PSR staff. Spoke with Amy at [facility] who received the appeal.     States that as of 2/29/24, insurance termed. No longer covered by that plan.    Can hold for now.     Called pt. Verified she no longer has [employer], has [employer].     [employer] ID#[id number]  States she spoke on the phone with Yahaira Abel extension #[phone] to set up new insurance.     Phone#[phone]    Was told it was a PPO plan    Confirm#[id number]      Called Amy back to have her withdraw appeal request. Left message.     Will route to [employer] to start new PA. Pt is aware that the process needs to be restarted with new insurance and they may not have outcome before her scheduled appt. Provided their number for pt to call if she does not hear anything as it approaches appt. Pt does not utilize [employer].

## 2024-03-05 ENCOUNTER — OFFICE VISIT (OUTPATIENT)
Dept: INTERNAL MEDICINE CLINIC | Facility: CLINIC | Age: 59
End: 2024-03-05
Payer: MEDICARE

## 2024-03-05 VITALS
DIASTOLIC BLOOD PRESSURE: 84 MMHG | WEIGHT: 183.63 LBS | BODY MASS INDEX: 32.54 KG/M2 | OXYGEN SATURATION: 98 % | RESPIRATION RATE: 18 BRPM | TEMPERATURE: 98 F | HEIGHT: 63 IN | HEART RATE: 78 BPM | SYSTOLIC BLOOD PRESSURE: 136 MMHG

## 2024-03-05 DIAGNOSIS — F41.9 ANXIETY: ICD-10-CM

## 2024-03-05 DIAGNOSIS — G43.711 INTRACTABLE CHRONIC MIGRAINE WITHOUT AURA AND WITH STATUS MIGRAINOSUS: ICD-10-CM

## 2024-03-05 DIAGNOSIS — E66.9 CLASS 1 OBESITY WITHOUT SERIOUS COMORBIDITY WITH BODY MASS INDEX (BMI) OF 30.0 TO 30.9 IN ADULT, UNSPECIFIED OBESITY TYPE: ICD-10-CM

## 2024-03-05 DIAGNOSIS — K21.9 GASTROESOPHAGEAL REFLUX DISEASE, UNSPECIFIED WHETHER ESOPHAGITIS PRESENT: ICD-10-CM

## 2024-03-05 DIAGNOSIS — I73.00 RAYNAUD'S PHENOMENON WITHOUT GANGRENE: ICD-10-CM

## 2024-03-05 DIAGNOSIS — M54.12 CERVICAL RADICULOPATHY: ICD-10-CM

## 2024-03-05 DIAGNOSIS — L30.9 DERMATITIS: ICD-10-CM

## 2024-03-05 DIAGNOSIS — K50.10 CROHN'S DISEASE OF COLON WITHOUT COMPLICATION (HCC): ICD-10-CM

## 2024-03-05 DIAGNOSIS — Z71.6 ENCOUNTER FOR TOBACCO USE CESSATION COUNSELING: ICD-10-CM

## 2024-03-05 DIAGNOSIS — R56.9 SEIZURE (HCC): ICD-10-CM

## 2024-03-05 DIAGNOSIS — Z00.00 ROUTINE PHYSICAL EXAMINATION: Primary | ICD-10-CM

## 2024-03-05 PROBLEM — I10 PRIMARY HYPERTENSION: Status: RESOLVED | Noted: 2024-02-05 | Resolved: 2024-03-05

## 2024-03-05 RX ORDER — TRIAMCINOLONE ACETONIDE 1 MG/G
CREAM TOPICAL
Qty: 45 G | Refills: 3 | Status: SHIPPED | OUTPATIENT
Start: 2024-03-05

## 2024-03-05 NOTE — PATIENT INSTRUCTIONS
Continue to exercise at least 150 minutes a week and Eat a plant based diet     Please take 2000 IU of vitamin D daily for life to keep your bones strong    Schedule your appointment with the weight loss clinic and the GI doctor    Schedule your mammogram    Please stop smoking as well    Try the cream I provided and start CeraVe cream after you shower    See me back in 6 months

## 2024-03-05 NOTE — PROGRESS NOTES
Patient Office Visit    ASSESSMENT AND PLAN:   1. Routine physical examination  Note: Continue to exercise at least 150 minutes a week and Eat a plant based diet. Please take 2000 IU of vitamin D daily for life to keep your bones strong. Please see a dermatologist yearly for skin checks and Please see your dentist every 6 months     2. Anxiety  Note: Takes Xanax sparingly and continue with Lexapro    3. Class 1 obesity without serious comorbidity with body mass index (BMI) of 30.0 to 30.9 in adult, unspecified obesity type  Note: Did not tolerate Topamax and will avoid phentermine due to patient having a high blood pressure at times.  Unable to do Wellbutrin due to history of seizures. Zepbound and Wegovy were not approved.  Recommended to follow-up with the weight loss clinic  - [facility] - Sue Perry PA-C [address] [place] [address], [address]    4. Crohn's disease of colon without complication (HCC)  Note: Continue budesonide and reminded patient to follow-up with the GI doctor  - Gastro Referral - External    5. Gastroesophageal reflux disease, unspecified whether esophagitis present  Note: Continue pantoprazole    6. Intractable chronic migraine without aura and with status migrainosus  Note: Continue follow-up with neurologist.  On sumatriptan, meclizine and Aimovig    7. Seizure (HCC)  Note: Continue Keppra    8. Raynaud's phenomenon without gangrene  Note: Stable.    9. Dermatitis  Note: Recommended to avoid scratching and use CeraVe cream regularly after shower.  Will start treatment below.  - triamcinolone 0.1 % External Cream; Use twice a day to affected area for 2 weeks then take a break for 2 weeks then apply again for 2 weeks  Dispense: 45 g; Refill: 3    10. Encounter for tobacco use cessation counseling  Note: Discussed the risks of smoking and patient will try to quit on her own.  She has decreased amount of cigarettes she smokes    11.  Cervical radiculopathy  Note: Continue  gabapentin and has scheduled an MRI    Return to clinic in 6 months    Patient/Caregiver Education: Patient/Caregiver Education: There are no barriers to learning. Medical education done. Outcome: Patient verbalizes understanding. Patient is notified to call with any questions, complications, allergies, or worsening or changing symptoms.  Patient is to call with any side effects or complications from the treatments as a result of today.      Reviewed Past Medical History and   Patient Active Problem List   Diagnosis    Closed fracture of shaft of metacarpal bone(s)    Cognitive deficits    Crohn's disease of colon without complication (HCC)    Seizure (HCC)    Anxiety    Raynaud's phenomenon without gangrene    Excessive sleepiness    Intractable chronic migraine without aura and with status migrainosus    Class 1 obesity without serious comorbidity with body mass index (BMI) of 30.0 to 30.9 in adult    Gastroesophageal reflux disease       No orders of the defined types were placed in this encounter.    Requested Prescriptions     Signed Prescriptions Disp Refills    triamcinolone 0.1 % External Cream 45 g 3     Sig: Use twice a day to affected area for 2 weeks then take a break for 2 weeks then apply again for 2 weeks         Anh Rosado DO  CC:  Chief Complaint   Patient presents with    Follow - Up         HPI:   Leila Alarcon is a 58-year-old female who presents for routine physical    High blood pressure: She took enalapril for 1 day and felt really dizzy so decided to stop it.  She does not think her blood pressure is high and feels that it was due to anxiety as her son was going to senior living.  Anxiety: Has been better improved on Lexapro  Obesity: She tried to get zepbound approved but it was not approved through her insurance and is wondering what other options she has  Migraines: Aimovig  was recently approved and is stable.   Seizures: She has been seizure-free for the past few years    Past  Medical History:   Diagnosis Date    Anxiety state     Back problem     DEGENERATIVE DISC    Crohn disease (HCC)     Depression     Diverticulosis of large intestine     Dizziness     Migraines     Seizure disorder (HCC)        Past Surgical History:   Procedure Laterality Date    BUNIONECTOMY Right 2023    COLON SURGERY      partial resection    HERNIA REPAIR      RONDA LOCALIZATION WIRE 1 SITE LEFT (CPT=19281) Left     benign , several yrs ago    REMOVAL GALLBLADDER      REPAIR OF HAMMERTOE,ONE Right 2023    WRIST FRACTURE SURGERY      plate and screws  left       Social History:  Social History     Socioeconomic History    Marital status: Single   Tobacco Use    Smoking status: Every Day     Packs/day: .5     Types: Cigarettes     Start date: 1/1/1981    Smokeless tobacco: Never   Vaping Use    Vaping Use: Never used   Substance and Sexual Activity    Alcohol use: Not Currently    Drug use: Never   Other Topics Concern    Caffeine Concern No    Exercise Yes     Comment: walking     Family History:  Family History   Family history unknown: Yes     Allergies:  Allergies   Allergen Reactions    Dust Mite Extract OTHER (SEE COMMENTS)    Seasonal OTHER (SEE COMMENTS)     Runny nose     Current Meds:  Current Outpatient Medications on File Prior to Visit   Medication Sig Dispense Refill    escitalopram (LEXAPRO) 20 MG Oral Tab Take one tablet daily 90 tablet 1    levETIRAcetam 500 MG Oral Tab Take 1 tablet (500 mg total) by mouth 2 (two) times daily. 180 tablet 3    SUMAtriptan 50 MG Oral Tab TAKE 1 TABLET BY MOUTH AT ONSET OF HEADACHE. MAY REPEAT ONCE AFTER 2 HOURS- TAKE MAXIMUM OF 2 TABLETS PER DAY 9 tablet 3    meclizine 25 MG Oral Tab Take 1 tablet (25 mg total) by mouth 3 (three) times daily as needed. 30 tablet 0    ALPRAZolam (XANAX) 0.5 MG Oral Tab Take 1 tablet (0.5 mg total) by mouth nightly as needed. 30 tablet 0    pantoprazole 40 MG Oral Tab EC Take 1 tablet (40 mg total) by mouth before breakfast. 90 tablet  1    budesonide 3 MG Oral Cap DR Particles Take 1 capsule (3 mg total) by mouth 3 (three) times daily before meals as needed. 180 capsule 1    gabapentin 300 MG Oral Cap Take 1 capsule (300 mg total) by mouth nightly. (Patient taking differently: Take 2 capsules (600 mg total) by mouth nightly.) 90 capsule 3    erenumab-aooe (AIMOVIG) 70 MG/ML Subcutaneous Inject 1 mL (70 mg total) into the skin every 30 (thirty) days. (Patient not taking: Reported on 3/5/2024) 1 mL 11     Current Facility-Administered Medications on File Prior to Visit   Medication Dose Route Frequency Provider Last Rate Last Admin    cyanocobalamin (Vitamin B12) 1000 MCG/ML injection 1,000 mcg  1,000 mcg Intramuscular Q30 Days Anh Rosado,    1,000 mcg at 04/14/23 1230         REVIEW OF SYSTEMS   Constitutional: no fatigue normal energy no weight changes   HENT: normal sinuses and no mouth issues   Eyes: . normal vision no eye pain   Respiratory: normal respirations no cough   Cardiovascular: no CP, or palpitations   Gastrointestinal: normal bowels and no abd pains   Genitourinary:  normal urination no hematuria, no frequency   Musculoskeletal: no pains in arms/legs, normal range of motion   Skin: no rashes or skin lesions that are new   Neurological:  no weakness, no numbness, normal gait   Hematological:  no bruises or bleeding   Psychiatric/Behavioral: normal mood no anxiety normal behavior     /84   Pulse 78   Temp 97.7 °F (36.5 °C) (Temporal)   Resp 18   Ht 5' 3\" (1.6 m)   Wt 183 lb 9.6 oz (83.3 kg)   LMP  (LMP Unknown)   SpO2 98%   BMI 32.52 kg/m²     PHYSICAL EXAM:   Constitutional: Vital signs reviewed as noted, well developed, in no acute distress.   HENT: NCAT, bilateral ear canal and tympanic membrane appear normal.  Normal hearing to finger rub test  Eyes: pupils reactive bilaterally  Neck: No thyroidmegaly  Cardiovascular: nl s1 s2 no m/r/g  Pulmonary/Chest: CTA bilaterally with no wheezes  Abdominal: Soft NT  normal Bowel sounds  Extremities: no pedal edema   Neurological:  no weakness in UE and LE, reflexes are normal  Skin: Small papules with excoriations noted in the arms bilaterally  Psychiatric:normal mood

## 2024-03-17 ENCOUNTER — HOSPITAL ENCOUNTER (OUTPATIENT)
Dept: MRI IMAGING | Age: 59
Discharge: HOME | End: 2024-03-17
Attending: Other
Payer: MEDICARE

## 2024-03-17 ENCOUNTER — HOSPITAL ENCOUNTER (OUTPATIENT)
Dept: MRI IMAGING | Age: 59
End: 2024-03-17
Attending: Other
Payer: MEDICARE

## 2024-03-17 DIAGNOSIS — M54.2 NECK PAIN: ICD-10-CM

## 2024-03-17 PROCEDURE — 72141 MRI NECK SPINE W/O DYE: CPT | Performed by: OTHER

## 2024-03-27 ENCOUNTER — TELEPHONE (OUTPATIENT)
Dept: INTERNAL MEDICINE CLINIC | Facility: CLINIC | Age: 59
End: 2024-03-27

## 2024-03-27 ENCOUNTER — HOSPITAL ENCOUNTER (OUTPATIENT)
Dept: CT IMAGING | Facility: HOSPITAL | Age: 59
Discharge: HOME | End: 2024-03-27
Attending: INTERNAL MEDICINE
Payer: MEDICARE

## 2024-03-27 ENCOUNTER — OFFICE VISIT (OUTPATIENT)
Dept: INTERNAL MEDICINE CLINIC | Facility: CLINIC | Age: 59
End: 2024-03-27
Payer: MEDICARE

## 2024-03-27 VITALS
SYSTOLIC BLOOD PRESSURE: 120 MMHG | WEIGHT: 177 LBS | HEART RATE: 77 BPM | TEMPERATURE: 98 F | BODY MASS INDEX: 31 KG/M2 | OXYGEN SATURATION: 99 % | RESPIRATION RATE: 15 BRPM | DIASTOLIC BLOOD PRESSURE: 60 MMHG

## 2024-03-27 DIAGNOSIS — R10.31 RIGHT LOWER QUADRANT ABDOMINAL PAIN: Primary | ICD-10-CM

## 2024-03-27 DIAGNOSIS — N89.8 VAGINAL DISCHARGE: ICD-10-CM

## 2024-03-27 DIAGNOSIS — N89.8 VAGINAL IRRITATION: ICD-10-CM

## 2024-03-27 DIAGNOSIS — R10.31 RIGHT LOWER QUADRANT ABDOMINAL PAIN: ICD-10-CM

## 2024-03-27 DIAGNOSIS — N94.10 DYSPAREUNIA IN FEMALE: ICD-10-CM

## 2024-03-27 LAB
APPEARANCE: CLEAR
BILIRUBIN: NEGATIVE
CREAT BLD-MCNC: 1.1 MG/DL
EGFRCR SERPLBLD CKD-EPI 2021: 59 ML/MIN/1.73M2 (ref 60–?)
GLUCOSE (URINE DIPSTICK): NEGATIVE MG/DL
KETONES (URINE DIPSTICK): NEGATIVE MG/DL
LEUKOCYTES: NEGATIVE
MULTISTIX LOT#: ABNORMAL NUMERIC
NITRITE, URINE: NEGATIVE
PH, URINE: 6 (ref 4.5–8)
PROTEIN (URINE DIPSTICK): 100 MG/DL
SPECIFIC GRAVITY: >=1.03 (ref 1–1.03)
URINE-COLOR: YELLOW
UROBILINOGEN,SEMI-QN: 0.2 MG/DL (ref 0–1.9)

## 2024-03-27 PROCEDURE — 99214 OFFICE O/P EST MOD 30 MIN: CPT | Performed by: INTERNAL MEDICINE

## 2024-03-27 PROCEDURE — 87591 N.GONORRHOEAE DNA AMP PROB: CPT | Performed by: INTERNAL MEDICINE

## 2024-03-27 PROCEDURE — 87086 URINE CULTURE/COLONY COUNT: CPT | Performed by: INTERNAL MEDICINE

## 2024-03-27 PROCEDURE — 81003 URINALYSIS AUTO W/O SCOPE: CPT | Performed by: INTERNAL MEDICINE

## 2024-03-27 PROCEDURE — 3078F DIAST BP <80 MM HG: CPT | Performed by: INTERNAL MEDICINE

## 2024-03-27 PROCEDURE — 81514 NFCT DS BV&VAGINITIS DNA ALG: CPT | Performed by: INTERNAL MEDICINE

## 2024-03-27 PROCEDURE — 87491 CHLMYD TRACH DNA AMP PROBE: CPT | Performed by: INTERNAL MEDICINE

## 2024-03-27 PROCEDURE — 82565 ASSAY OF CREATININE: CPT

## 2024-03-27 PROCEDURE — 74177 CT ABD & PELVIS W/CONTRAST: CPT | Performed by: INTERNAL MEDICINE

## 2024-03-27 PROCEDURE — 3074F SYST BP LT 130 MM HG: CPT | Performed by: INTERNAL MEDICINE

## 2024-03-27 RX ORDER — METHYLPREDNISOLONE 4 MG/1
TABLET ORAL
Qty: 1 EACH | Refills: 0 | Status: SHIPPED | OUTPATIENT
Start: 2024-03-27

## 2024-03-27 NOTE — TELEPHONE ENCOUNTER
Patient called to request new prescription for pain medication to help with abdominal pain she has been having.     [id number] - [place], [place] - [address] , [phone], [phone]     Please advise.

## 2024-03-27 NOTE — PROGRESS NOTES
Subjective:   Leila Alarcon is a 58 year old female  who presents for Abdominal Pain     Pt of Dr. Rosado who presents for an acute visit:     RLQ pain that goes down. Started last night.  States that she also feels it in her back and RLE. ?sciatica as well.   Denies falls/traumas.   Denies n/v/d/rectal bleeding.   Does report constipation. Last BM 2 days ago.   Denies acute UTI symptoms.   But does report vaginal itchiness and vaginal discharge for 1 month.   UTD with pap smear- last in 2021  Reports dyspareunia- to follow-up with gyne.     History/Other:    Chief Complaint Reviewed and Verified  No Further Nursing Notes to   Review  Tobacco Reviewed  Allergies Reviewed  Medications Reviewed  OB   Status Reviewed         Current Outpatient Medications   Medication Sig Dispense Refill    triamcinolone 0.1 % External Cream Use twice a day to affected area for 2 weeks then take a break for 2 weeks then apply again for 2 weeks 45 g 3    escitalopram (LEXAPRO) 20 MG Oral Tab Take one tablet daily 90 tablet 1    levETIRAcetam 500 MG Oral Tab Take 1 tablet (500 mg total) by mouth 2 (two) times daily. 180 tablet 3    SUMAtriptan 50 MG Oral Tab TAKE 1 TABLET BY MOUTH AT ONSET OF HEADACHE. MAY REPEAT ONCE AFTER 2 HOURS- TAKE MAXIMUM OF 2 TABLETS PER DAY 9 tablet 3    meclizine 25 MG Oral Tab Take 1 tablet (25 mg total) by mouth 3 (three) times daily as needed. 30 tablet 0    ALPRAZolam (XANAX) 0.5 MG Oral Tab Take 1 tablet (0.5 mg total) by mouth nightly as needed. 30 tablet 0    pantoprazole 40 MG Oral Tab EC Take 1 tablet (40 mg total) by mouth before breakfast. 90 tablet 1    budesonide 3 MG Oral Cap DR Particles Take 1 capsule (3 mg total) by mouth 3 (three) times daily before meals as needed. 180 capsule 1    erenumab-aooe (AIMOVIG) 70 MG/ML Subcutaneous Inject 1 mL (70 mg total) into the skin every 30 (thirty) days. 1 mL 11    gabapentin 300 MG Oral Cap Take 1 capsule (300 mg total) by mouth nightly.  (Patient taking differently: Take 2 capsules (600 mg total) by mouth nightly.) 90 capsule 3       Review of Systems:  Pertinent items are noted in HPI.  A comprehensive 10 point review of systems was completed.  Pertinent positives and negatives noted in the the HPI.        Objective:   /60 (BP Location: Left arm, Patient Position: Sitting, Cuff Size: adult)   Pulse 77   Temp 98 °F (36.7 °C) (Temporal)   Resp 15   Wt 177 lb (80.3 kg)   LMP  (LMP Unknown)   SpO2 99%   BMI 31.35 kg/m²  Estimated body mass index is 31.35 kg/m² as calculated from the following:    Height as of 3/5/24: 5' 3\" (1.6 m).    Weight as of this encounter: 177 lb (80.3 kg).  PHYSICAL EXAM:   General: no acute distress   Respiratory: no increased work of breathing;   Cardiovascular: RRR; S1, S2;  no edema   Gastrointestinal: normal bowel sounds; soft; non-distended; tenderness in lower abdomen. Worse on RLQ  Neurological: awake, alert, oriented x3; CNII-XII grossly intact;  MSK: pain with certain sitting positions.   Behavioral/Psych: euthymic; appropriate affect   : no external lesions. Some discharge     Assessment & Plan:     Leila was seen today for abdominal pain.    Diagnoses and all orders for this visit:    Right lower quadrant abdominal pain  Comments:  rule out appendicitis  Orders:  -     Urine Dip, auto without Micro  -     Vaginitis Vaginosis PCR Panel; Future  -     CT ABDOMEN+PELVIS(CONTRAST ONLY)(CPT=74177); STAT-office actively trying to schedule. If not available now then pt to go to  ER.   Pt voiced understanding     Vaginal discharge  -     Vaginitis Vaginosis PCR Panel; Future  -     Chlamydia/Gc Amplification [E]; Future    Vaginal irritation  -     Vaginitis Vaginosis PCR Panel; Future  -     Chlamydia/Gc Amplification [E]; Future  Dyspareunia in female  -     OBG Referral - In Network    Discussed when to seek urgent medical attention; voiced understanding             Alcira Frances MD

## 2024-03-27 NOTE — TELEPHONE ENCOUNTER
Patient walked into office after appointment and call to office.  Pt advised of CT result and provider rec.  She is aware to pickup medrol pack from pharmacy.    See CT Result Note 3/27/24    SV - Are you able to add onto your schedule sometime week of 4/8? Patient will be traveling starting 4/15.

## 2024-03-28 LAB
BV BACTERIA DNA VAG QL NAA+PROBE: NEGATIVE
C GLABRATA DNA VAG QL NAA+PROBE: NEGATIVE
C KRUSEI DNA VAG QL NAA+PROBE: NEGATIVE
C TRACH DNA SPEC QL NAA+PROBE: NEGATIVE
CANDIDA DNA VAG QL NAA+PROBE: NEGATIVE
N GONORRHOEA DNA SPEC QL NAA+PROBE: NEGATIVE
T VAGINALIS DNA VAG QL NAA+PROBE: NEGATIVE

## 2024-03-28 NOTE — TELEPHONE ENCOUNTER
Future Appointments   Date Time Provider Department Center   4/9/2024  3:00 PM Anh Rosado DO [facility] [place]   5/6/2024 11:40 AM PF [facility] RM1 PF [facility]   8/29/2024  9:00 AM Alejandra Luque MD [facility] [address] [facility] [address]   9/5/2024 10:00 AM Anh Rosado DO [facility] [place]

## 2024-04-09 ENCOUNTER — OFFICE VISIT (OUTPATIENT)
Dept: INTERNAL MEDICINE CLINIC | Facility: CLINIC | Age: 59
End: 2024-04-09
Payer: MEDICARE

## 2024-04-09 VITALS
TEMPERATURE: 98 F | SYSTOLIC BLOOD PRESSURE: 166 MMHG | HEART RATE: 99 BPM | DIASTOLIC BLOOD PRESSURE: 90 MMHG | RESPIRATION RATE: 16 BRPM | OXYGEN SATURATION: 98 % | HEIGHT: 63 IN | WEIGHT: 179.63 LBS | BODY MASS INDEX: 31.83 KG/M2

## 2024-04-09 DIAGNOSIS — M54.16 LUMBAR RADICULOPATHY: Primary | ICD-10-CM

## 2024-04-09 DIAGNOSIS — I10 PRIMARY HYPERTENSION: ICD-10-CM

## 2024-04-09 PROCEDURE — 3080F DIAST BP >= 90 MM HG: CPT | Performed by: INTERNAL MEDICINE

## 2024-04-09 PROCEDURE — 3008F BODY MASS INDEX DOCD: CPT | Performed by: INTERNAL MEDICINE

## 2024-04-09 PROCEDURE — 99213 OFFICE O/P EST LOW 20 MIN: CPT | Performed by: INTERNAL MEDICINE

## 2024-04-09 PROCEDURE — 3077F SYST BP >= 140 MM HG: CPT | Performed by: INTERNAL MEDICINE

## 2024-04-09 RX ORDER — HYDROCODONE BITARTRATE AND ACETAMINOPHEN 5; 325 MG/1; MG/1
1 TABLET ORAL DAILY PRN
Qty: 25 TABLET | Refills: 0 | Status: SHIPPED | OUTPATIENT
Start: 2024-04-09

## 2024-04-09 NOTE — PATIENT INSTRUCTIONS
I have ordered the Norco for you but I will not provide any further refills.  Continue with heat/ice and physical therapy    If your blood pressure is consistently above 140/90 then you can take half a tablet of the enalapril that was given to you previously    Please follow-up with the weight loss clinic and your GI doctor

## 2024-04-09 NOTE — PROGRESS NOTES
Patient Office Visit    ASSESSMENT AND PLAN:   1. Lumbar radiculopathy  Note: Discussed with patient that only a small amount will be provided.  Continue with physical therapy and heat/ice  - HYDROcodone-acetaminophen (NORCO) 5-325 MG Oral Tab; Take 1 tablet by mouth daily as needed for Pain.  Dispense: 25 tablet; Refill: 0    2. Primary hypertension  Note: Discussed the risks of untreated high blood pressure.  Patient does not want any treatment as she feels it is related to her physical therapy and recent smoking.  Recommended to keep an eye on her blood pressure and if it goes above 140/90 then she can take half a tablet of enalapril that was provided previously.    Return to clinic in 1 month or sooner for any acute concerns      Patient/Caregiver Education: Patient/Caregiver Education: There are no barriers to learning. Medical education done. Outcome: Patient verbalizes understanding. Patient is notified to call with any questions, complications, allergies, or worsening or changing symptoms.  Patient is to call with any side effects or complications from the treatments as a result of today.      Reviewed Past Medical History and   Patient Active Problem List   Diagnosis    Closed fracture of shaft of metacarpal bone(s)    Cognitive deficits    Crohn's disease of colon without complication (HCC)    Seizure (HCC)    Anxiety    Raynaud's phenomenon without gangrene    Excessive sleepiness    Intractable chronic migraine without aura and with status migrainosus    Class 1 obesity without serious comorbidity with body mass index (BMI) of 30.0 to 30.9 in adult    Gastroesophageal reflux disease    Cervical radiculopathy       No orders of the defined types were placed in this encounter.    Requested Prescriptions     Signed Prescriptions Disp Refills    HYDROcodone-acetaminophen (NORCO) 5-325 MG Oral Tab 25 tablet 0     Sig: Take 1 tablet by mouth daily as needed for Pain.         Anh Rosado DO  CC:  Chief  Complaint   Patient presents with    Abdominal Pain         HPI:   Leila Alarcon is a 58-year-old female who presents for a follow-up    Was recently seen for abdominal pain but she has a lot of low back pain that is going down the leg.  She just did physical therapy which has been helping but it makes her very stiff.  She has been applying creams and taking over-the-counter medicines with mild relief.  She will be going on a cruise soon and is hoping to get a short supply of Norco for pain relief while she is on her medication  Elevated blood pressure: She has not been checking her blood pressure at home and not taking her medicine.  She only took it for couple days and she felt that the blood pressure was going too low.  She denies any symptoms.  She just underwent physical therapy and was smoking prior to coming in    Past Medical History:   Diagnosis Date    Anxiety state     Back problem     DEGENERATIVE DISC    Crohn disease (HCC)     Depression     Diverticulosis of large intestine     Dizziness     Migraines     Seizure disorder (HCC)        Past Surgical History:   Procedure Laterality Date    BUNIONECTOMY Right 2023    COLON SURGERY      partial resection    HERNIA REPAIR      RONDA LOCALIZATION WIRE 1 SITE LEFT (CPT=19281) Left     benign , several yrs ago    REMOVAL GALLBLADDER      REPAIR OF HAMMERTOE,ONE Right 2023    WRIST FRACTURE SURGERY      plate and screws  left       Social History:  Social History     Socioeconomic History    Marital status: Single   Tobacco Use    Smoking status: Every Day     Packs/day: .5     Types: Cigarettes     Start date: 1/1/1981    Smokeless tobacco: Never   Vaping Use    Vaping Use: Never used   Substance and Sexual Activity    Alcohol use: Not Currently    Drug use: Never   Other Topics Concern    Caffeine Concern No    Exercise Yes     Comment: walking     Family History:  Family History   Family history unknown: Yes     Allergies:  Allergies   Allergen  Reactions    Dust Mite Extract OTHER (SEE COMMENTS)    Seasonal OTHER (SEE COMMENTS)     Runny nose     Current Meds:  Current Outpatient Medications on File Prior to Visit   Medication Sig Dispense Refill    methylPREDNISolone (MEDROL) 4 MG Oral Tablet Therapy Pack As directed. 1 each 0    triamcinolone 0.1 % External Cream Use twice a day to affected area for 2 weeks then take a break for 2 weeks then apply again for 2 weeks 45 g 3    escitalopram (LEXAPRO) 20 MG Oral Tab Take one tablet daily 90 tablet 1    levETIRAcetam 500 MG Oral Tab Take 1 tablet (500 mg total) by mouth 2 (two) times daily. 180 tablet 3    SUMAtriptan 50 MG Oral Tab TAKE 1 TABLET BY MOUTH AT ONSET OF HEADACHE. MAY REPEAT ONCE AFTER 2 HOURS- TAKE MAXIMUM OF 2 TABLETS PER DAY 9 tablet 3    meclizine 25 MG Oral Tab Take 1 tablet (25 mg total) by mouth 3 (three) times daily as needed. 30 tablet 0    ALPRAZolam (XANAX) 0.5 MG Oral Tab Take 1 tablet (0.5 mg total) by mouth nightly as needed. 30 tablet 0    pantoprazole 40 MG Oral Tab EC Take 1 tablet (40 mg total) by mouth before breakfast. 90 tablet 1    budesonide 3 MG Oral Cap DR Particles Take 1 capsule (3 mg total) by mouth 3 (three) times daily before meals as needed. 180 capsule 1    erenumab-aooe (AIMOVIG) 70 MG/ML Subcutaneous Inject 1 mL (70 mg total) into the skin every 30 (thirty) days. 1 mL 11    gabapentin 300 MG Oral Cap Take 1 capsule (300 mg total) by mouth nightly. (Patient taking differently: Take 2 capsules (600 mg total) by mouth nightly.) 90 capsule 3     Current Facility-Administered Medications on File Prior to Visit   Medication Dose Route Frequency Provider Last Rate Last Admin    cyanocobalamin (Vitamin B12) 1000 MCG/ML injection 1,000 mcg  1,000 mcg Intramuscular Q30 Days Anh Rosado DO   1,000 mcg at 04/14/23 1230         REVIEW OF SYSTEMS   Constitutional: no fatigue normal energy no weight changes   HENT: normal sinuses and no mouth issues   Eyes: . normal  vision no eye pain   Respiratory: normal respirations no cough   Cardiovascular: no CP, or palpitations   Gastrointestinal: normal bowels and no abd pains   Genitourinary:  normal urination no hematuria, no frequency   Musculoskeletal: as above  Skin: no rashes or skin lesions that are new   Neurological:  no weakness, no numbness, normal gait   Hematological:  no bruises or bleeding   Psychiatric/Behavioral: normal mood no anxiety normal behavior     BP (!) 166/90 (BP Location: Left arm, Patient Position: Sitting, Cuff Size: adult)   Pulse 99   Temp 97.9 °F (36.6 °C) (Temporal)   Resp 16   Ht 5' 3\" (1.6 m)   Wt 179 lb 9.6 oz (81.5 kg)   LMP  (LMP Unknown)   SpO2 98%   BMI 31.81 kg/m²     PHYSICAL EXAM:   Constitutional: Vital signs reviewed as noted, well developed, in no acute distress.   HENT: NCAT  Eyes: pupils reactive bilaterally  Cardiovascular: nl s1 s2 no m/r/g  Pulmonary/Chest: CTA bilaterally with no wheezes  Musculoskeletal: Pain with flexion and extension of the back  Extremities: no pedal edema   Neurological:  no weakness in UE and LE, reflexes are normal  Skin: no rashes or bruises on visualized skin, not undressed   Psychiatric:normal mood

## 2024-04-22 ENCOUNTER — TELEPHONE (OUTPATIENT)
Dept: INTERNAL MEDICINE CLINIC | Facility: CLINIC | Age: 59
End: 2024-04-22

## 2024-04-22 NOTE — TELEPHONE ENCOUNTER
Can we get hospital records? I can add her on 4/25/24 at 3, but if symptoms worsen then needs to go to the ER  Anh Rosado DO

## 2024-04-22 NOTE — TELEPHONE ENCOUNTER
Pt is requesting for a sooner apt with Dr. Rosado because she was placed into a [facility] in [place] from 4/16-4/19 . Pt went to their ER due to feet swelling and was informed they have concerns regarding her heart. Pt discharged herself and was later picked up by the police and ambulance because her mother advised she was \"going crazy\" and stated pt \"wanted to kill herself\" but she denies claims.     Pt was also kicked out of her mothers house and would like to see Dr. Rosado (denied other provider) because she is feeling overwhelmed with everything going on.    Ok to DB? Please advise.

## 2024-04-22 NOTE — TELEPHONE ENCOUNTER
Patient informed.     She does not know what hospital she was in therefore cannot update care everywhere. Patient will bring in paperwork.

## 2024-04-25 ENCOUNTER — OFFICE VISIT (OUTPATIENT)
Dept: INTERNAL MEDICINE CLINIC | Facility: CLINIC | Age: 59
End: 2024-04-25
Payer: MEDICARE

## 2024-04-25 VITALS
HEART RATE: 70 BPM | OXYGEN SATURATION: 95 % | RESPIRATION RATE: 16 BRPM | BODY MASS INDEX: 32.07 KG/M2 | SYSTOLIC BLOOD PRESSURE: 140 MMHG | WEIGHT: 181 LBS | TEMPERATURE: 98 F | HEIGHT: 63 IN | DIASTOLIC BLOOD PRESSURE: 90 MMHG

## 2024-04-25 DIAGNOSIS — R60.0 LOWER EXTREMITY EDEMA: Primary | ICD-10-CM

## 2024-04-25 DIAGNOSIS — F41.9 ANXIETY: ICD-10-CM

## 2024-04-25 DIAGNOSIS — M54.12 CERVICAL RADICULOPATHY: ICD-10-CM

## 2024-04-25 PROBLEM — N18.30 CKD (CHRONIC KIDNEY DISEASE) STAGE 3, GFR 30-59 ML/MIN (HCC): Chronic | Status: ACTIVE | Noted: 2024-04-25

## 2024-04-25 LAB
ATRIAL RATE: 69 BPM
P AXIS: 43 DEGREES
P-R INTERVAL: 164 MS
Q-T INTERVAL: 438 MS
QRS DURATION: 80 MS
QTC CALCULATION (BEZET): 469 MS
R AXIS: 26 DEGREES
T AXIS: 21 DEGREES
VENTRICULAR RATE: 69 BPM

## 2024-04-25 PROCEDURE — 3008F BODY MASS INDEX DOCD: CPT | Performed by: INTERNAL MEDICINE

## 2024-04-25 PROCEDURE — 3077F SYST BP >= 140 MM HG: CPT | Performed by: INTERNAL MEDICINE

## 2024-04-25 PROCEDURE — 93000 ELECTROCARDIOGRAM COMPLETE: CPT | Performed by: INTERNAL MEDICINE

## 2024-04-25 PROCEDURE — 99213 OFFICE O/P EST LOW 20 MIN: CPT | Performed by: INTERNAL MEDICINE

## 2024-04-25 PROCEDURE — 3080F DIAST BP >= 90 MM HG: CPT | Performed by: INTERNAL MEDICINE

## 2024-04-25 NOTE — PATIENT INSTRUCTIONS
Your EKG does not show any new changes. Echocardiogram (ultrasound of the heart) has been ordered    We will fax the PT referral    You will be getting a call from our behavioral specialist     If you have any thoughts about hurting yourself then please go to the ER and follow-up with me in 1 month   Ear Wedge Repair Text: A wedge excision was completed by carrying down an excision through the full thickness of the ear and cartilage with an inward facing Burow's triangle. The wound was then closed in a layered fashion.

## 2024-04-25 NOTE — PROGRESS NOTES
Patient Office Visit    ASSESSMENT AND PLAN:   1. Lower extremity edema  Note: EKG with normal sinus rhythm and no new ST or T wave changes noted.  Will order an echocardiogram and will obtain records from the hospital.  [phone] ([facility]) in [place]  [phone] ([place])  - CARD ECHO 2D DOPPLER (CPT=93306); Future  - EKG with interpretation and Report -IN OFFICE [[id number]]    2. Cervical radiculopathy  Note: Referral provided  - Physical Therapy Referral - External    3. Anxiety  Note: Continue Lexapro and take Xanax only sparingly.  Will refer for therapy.  Patient denies any suicidal ideations and feels well.  Patient did sign the forms to see if we can get her inpatient psych records. Discussed the red flags that will prompt an ER visit (suicidal ideations)  - OP REFERRAL TO [facility]    RTC in 1 month     Patient/Caregiver Education: Patient/Caregiver Education: There are no barriers to learning. Medical education done. Outcome: Patient verbalizes understanding. Patient is notified to call with any questions, complications, allergies, or worsening or changing symptoms.  Patient is to call with any side effects or complications from the treatments as a result of today.      Reviewed Past Medical History and   Patient Active Problem List   Diagnosis    Closed fracture of shaft of metacarpal bone(s)    Cognitive deficits    Crohn's disease of colon without complication (HCC)    Seizure ([facility])    Anxiety    Raynaud's phenomenon without gangrene    Excessive sleepiness    Intractable chronic migraine without aura and with status migrainosus    Class 1 obesity without serious comorbidity with body mass index (BMI) of 30.0 to 30.9 in adult    Gastroesophageal reflux disease    Cervical radiculopathy    CKD (chronic kidney disease) stage 3, GFR 30-59 ml/min ([facility])       No orders of the defined types were placed in this encounter.    Requested Prescriptions      No prescriptions  requested or ordered in this encounter         Anh Rosado DO  CC:  Chief Complaint   Patient presents with    Swelling         HPI:   Leila Alarcon is a 58-year-old female who presents with her ex- for a hospital follow-up    Lower extremity swelling: She was driving to [place] when she noticed swelling in her legs and dysuria.  She was seen at an ER and was diagnosed with a UTI.  An EKG was done which showed some changes that were old but patient was recommended to have some more cardiac workup done.  Patient does not recall all the events but she remembers that she was very angry with her mother and her father which led her to leave the ER.  The ambulance found her and told her that she needs to come in the ambulance if she is having any suicidal ideations.  She states that she had some Xanax and her purse which she dumped it out of the bottle into the purse and said \" find that she has suicidal ideation\" so she could go on the ambulance.  She does not remember what happened after that.  She was held for 72 hours in the psychiatry [facility].  She was discharged with no changes in her medications.  Anxiety: She has been very anxious since the episode but has not been taking Xanax regularly.  She has been compliant with the Lexapro.  She would like to follow-up with a therapist.  Cervical radiculopathy: She would like to follow-up with a different physical therapist as she has moved in with her former     Past Medical History:    Anxiety state    Back problem    DEGENERATIVE DISC    Crohn disease (HCC)    Depression    Diverticulosis of large intestine    Dizziness    Migraines    Seizure disorder (HCC)       Past Surgical History:   Procedure Laterality Date    Bunionectomy Right 2023    Colon surgery      partial resection    Hernia repair      Coral localization wire 1 site left (cpt=19281) Left     benign , several yrs ago    Removal gallbladder      Repair of hammertoe,one Right 2023     Wrist fracture surgery      plate and screws  left       Social History:  Social History     Socioeconomic History    Marital status: Single   Tobacco Use    Smoking status: Every Day     Current packs/day: 0.50     Average packs/day: 0.5 packs/day for 43.3 years (21.7 ttl pk-yrs)     Types: Cigarettes     Start date: 1/1/1981    Smokeless tobacco: Never   Vaping Use    Vaping status: Never Used   Substance and Sexual Activity    Alcohol use: Not Currently    Drug use: Never   Other Topics Concern    Caffeine Concern No    Exercise Yes     Comment: walking     Social Determinants of Health      Received from [facility]     Family History:  Family History   Family history unknown: Yes     Allergies:  Allergies   Allergen Reactions    Dust Mite Extract OTHER (SEE COMMENTS)    Seasonal OTHER (SEE COMMENTS)     Runny nose     Current Meds:  Current Outpatient Medications on File Prior to Visit   Medication Sig Dispense Refill    HYDROcodone-acetaminophen (NORCO) 5-325 MG Oral Tab Take 1 tablet by mouth daily as needed for Pain. 25 tablet 0    methylPREDNISolone (MEDROL) 4 MG Oral Tablet Therapy Pack As directed. 1 each 0    triamcinolone 0.1 % External Cream Use twice a day to affected area for 2 weeks then take a break for 2 weeks then apply again for 2 weeks 45 g 3    escitalopram (LEXAPRO) 20 MG Oral Tab Take one tablet daily 90 tablet 1    levETIRAcetam 500 MG Oral Tab Take 1 tablet (500 mg total) by mouth 2 (two) times daily. 180 tablet 3    SUMAtriptan 50 MG Oral Tab TAKE 1 TABLET BY MOUTH AT ONSET OF HEADACHE. MAY REPEAT ONCE AFTER 2 HOURS- TAKE MAXIMUM OF 2 TABLETS PER DAY 9 tablet 3    meclizine 25 MG Oral Tab Take 1 tablet (25 mg total) by mouth 3 (three) times daily as needed. 30 tablet 0    ALPRAZolam (XANAX) 0.5 MG Oral Tab Take 1 tablet (0.5 mg total) by mouth nightly as needed. 30 tablet 0    pantoprazole 40 MG Oral Tab EC Take 1 tablet (40 mg total) by mouth before breakfast. 90  tablet 1    budesonide 3 MG Oral Cap DR Particles Take 1 capsule (3 mg total) by mouth 3 (three) times daily before meals as needed. 180 capsule 1    erenumab-aooe (AIMOVIG) 70 MG/ML Subcutaneous Inject 1 mL (70 mg total) into the skin every 30 (thirty) days. 1 mL 11    gabapentin 300 MG Oral Cap Take 1 capsule (300 mg total) by mouth nightly. (Patient not taking: Reported on 4/25/2024) 90 capsule 3     Current Facility-Administered Medications on File Prior to Visit   Medication Dose Route Frequency Provider Last Rate Last Admin    cyanocobalamin (Vitamin B12) 1000 MCG/ML injection 1,000 mcg  1,000 mcg Intramuscular Q30 Days Anh Rosado,    1,000 mcg at 04/14/23 1230         REVIEW OF SYSTEMS   Constitutional: no fatigue normal energy no weight changes   HENT: normal sinuses and no mouth issues   Eyes: . normal vision no eye pain   Respiratory: normal respirations no cough   Cardiovascular: no CP, or palpitations   Gastrointestinal: normal bowels and no abd pains   Genitourinary:  normal urination no hematuria, no frequency   Musculoskeletal: no pains in arms/legs, normal range of motion   Skin: no rashes or skin lesions that are new   Neurological:  no weakness, no numbness, normal gait   Hematological:  no bruises or bleeding   Psychiatric/Behavioral: normal mood no anxiety normal behavior     /90 (BP Location: Right arm, Patient Position: Sitting, Cuff Size: adult)   Pulse 70   Temp 98.4 °F (36.9 °C) (Temporal)   Resp 16   Ht 5' 3\" (1.6 m)   Wt 181 lb (82.1 kg)   LMP  (LMP Unknown)   SpO2 95%   BMI 32.06 kg/m²     PHYSICAL EXAM:   Constitutional: Vital signs reviewed as noted, well developed, in no acute distress.   HENT: NCAT  Eyes: pupils reactive bilaterally  Cardiovascular: nl s1 s2 no m/r/g  Pulmonary/Chest: CTA bilaterally with no wheezes  Extremities: no pedal edema   Neurological:  no weakness in UE and LE, reflexes are normal  Skin: no rashes or bruises on visualized skin, not  undressed   Psychiatric:normal mood

## 2024-05-14 ENCOUNTER — TELEPHONE (OUTPATIENT)
Dept: INTERNAL MEDICINE CLINIC | Facility: CLINIC | Age: 59
End: 2024-05-14

## 2024-05-14 NOTE — TELEPHONE ENCOUNTER
Unable to reach patient and leave voicemail.    Lvm for sister regarding ED report printed and available for  at .

## 2024-05-14 NOTE — TELEPHONE ENCOUNTER
Patient called back stating she received a call    Informed patient that we attempted to reach her to inform her that her ED report is printed and is at the front ready to be picked up     Patient verbalized understanding and stated that she will be coming in on Friday to pick It up

## 2024-07-03 ENCOUNTER — TELEPHONE (OUTPATIENT)
Dept: INTERNAL MEDICINE CLINIC | Facility: CLINIC | Age: 59
End: 2024-07-03

## 2024-07-03 NOTE — TELEPHONE ENCOUNTER
Patient no show 7/3/2024 1:00 appt  Call placed to patient at 1:15 to offer VV  Patient declined appt, new appt made for later in month  DOS 7/3/2024 no show with charge

## 2024-07-30 ENCOUNTER — LAB ENCOUNTER (OUTPATIENT)
Dept: LAB | Age: 59
End: 2024-07-30
Attending: INTERNAL MEDICINE
Payer: MEDICARE

## 2024-07-30 ENCOUNTER — OFFICE VISIT (OUTPATIENT)
Dept: INTERNAL MEDICINE CLINIC | Facility: CLINIC | Age: 59
End: 2024-07-30
Payer: MEDICARE

## 2024-07-30 VITALS
OXYGEN SATURATION: 100 % | RESPIRATION RATE: 16 BRPM | BODY MASS INDEX: 33.38 KG/M2 | WEIGHT: 188.38 LBS | HEIGHT: 63 IN | SYSTOLIC BLOOD PRESSURE: 136 MMHG | TEMPERATURE: 98 F | DIASTOLIC BLOOD PRESSURE: 90 MMHG | HEART RATE: 82 BPM

## 2024-07-30 DIAGNOSIS — Z12.2 ENCOUNTER FOR SCREENING FOR LUNG CANCER: ICD-10-CM

## 2024-07-30 DIAGNOSIS — R73.03 PREDIABETES: ICD-10-CM

## 2024-07-30 DIAGNOSIS — F41.9 ANXIETY: ICD-10-CM

## 2024-07-30 DIAGNOSIS — I10 PRIMARY HYPERTENSION: Primary | ICD-10-CM

## 2024-07-30 DIAGNOSIS — M79.671 PAIN IN BOTH FEET: ICD-10-CM

## 2024-07-30 DIAGNOSIS — I10 PRIMARY HYPERTENSION: ICD-10-CM

## 2024-07-30 DIAGNOSIS — Z71.6 ENCOUNTER FOR TOBACCO USE CESSATION COUNSELING: ICD-10-CM

## 2024-07-30 DIAGNOSIS — M79.672 PAIN IN BOTH FEET: ICD-10-CM

## 2024-07-30 LAB
ANION GAP SERPL CALC-SCNC: 3 MMOL/L (ref 0–18)
BUN BLD-MCNC: 14 MG/DL (ref 9–23)
BUN/CREAT SERPL: 14.6 (ref 10–20)
CALCIUM BLD-MCNC: 9.4 MG/DL (ref 8.7–10.4)
CHLORIDE SERPL-SCNC: 110 MMOL/L (ref 98–112)
CO2 SERPL-SCNC: 28 MMOL/L (ref 21–32)
CREAT BLD-MCNC: 0.96 MG/DL
EGFRCR SERPLBLD CKD-EPI 2021: 69 ML/MIN/1.73M2 (ref 60–?)
EST. AVERAGE GLUCOSE BLD GHB EST-MCNC: 117 MG/DL (ref 68–126)
FASTING STATUS PATIENT QL REPORTED: YES
GLUCOSE BLD-MCNC: 101 MG/DL (ref 70–99)
HBA1C MFR BLD: 5.7 % (ref ?–5.7)
OSMOLALITY SERPL CALC.SUM OF ELEC: 293 MOSM/KG (ref 275–295)
POTASSIUM SERPL-SCNC: 5 MMOL/L (ref 3.5–5.1)
SODIUM SERPL-SCNC: 141 MMOL/L (ref 136–145)

## 2024-07-30 PROCEDURE — 3075F SYST BP GE 130 - 139MM HG: CPT | Performed by: INTERNAL MEDICINE

## 2024-07-30 PROCEDURE — 36415 COLL VENOUS BLD VENIPUNCTURE: CPT

## 2024-07-30 PROCEDURE — 3008F BODY MASS INDEX DOCD: CPT | Performed by: INTERNAL MEDICINE

## 2024-07-30 PROCEDURE — 3080F DIAST BP >= 90 MM HG: CPT | Performed by: INTERNAL MEDICINE

## 2024-07-30 PROCEDURE — 99214 OFFICE O/P EST MOD 30 MIN: CPT | Performed by: INTERNAL MEDICINE

## 2024-07-30 PROCEDURE — G2211 COMPLEX E/M VISIT ADD ON: HCPCS | Performed by: INTERNAL MEDICINE

## 2024-07-30 PROCEDURE — 83036 HEMOGLOBIN GLYCOSYLATED A1C: CPT

## 2024-07-30 PROCEDURE — 80048 BASIC METABOLIC PNL TOTAL CA: CPT

## 2024-07-30 RX ORDER — ALPRAZOLAM 0.5 MG/1
0.5 TABLET ORAL NIGHTLY PRN
Qty: 30 TABLET | Refills: 0 | Status: SHIPPED | OUTPATIENT
Start: 2024-07-30

## 2024-07-30 RX ORDER — AMLODIPINE BESYLATE 5 MG/1
5 TABLET ORAL NIGHTLY
Qty: 90 TABLET | Refills: 0 | Status: SHIPPED | OUTPATIENT
Start: 2024-07-30 | End: 2025-07-25

## 2024-07-30 RX ORDER — HYDROCODONE BITARTRATE AND ACETAMINOPHEN 5; 325 MG/1; MG/1
1 TABLET ORAL DAILY PRN
Qty: 20 TABLET | Refills: 0 | Status: SHIPPED | OUTPATIENT
Start: 2024-07-30

## 2024-07-30 NOTE — PROGRESS NOTES
Patient Office Visit    ASSESSMENT AND PLAN:   1. Primary hypertension  Note: She has not tolerated enalapril in the past.  Will try amlodipine and discussed the side effects of lower extremity swelling.  Continue to work on weight loss  - amLODIPine 5 MG Oral Tab; Take 1 tablet (5 mg total) by mouth at bedtime.  Dispense: 90 tablet; Refill: 0    2. Prediabetes  Note: Will recheck an A1c  - Hemoglobin A1C [E]; Future    3. Encounter for screening for lung cancer  Note: May not be covered through insurance.  Patient will call to check  - CT LUNG LD SCREENING(CPT=71271); Future    4. Pain in both feet  Note: Small amount of refill provided  - HYDROcodone-acetaminophen (NORCO) 5-325 MG Oral Tab; Take 1 tablet by mouth daily as needed for Pain.  Dispense: 20 tablet; Refill: 0    5. Anxiety  Note: Recommended to use it sparingly.  Continue Lexapro  - ALPRAZolam (XANAX) 0.5 MG Oral Tab; Take 1 tablet (0.5 mg total) by mouth nightly as needed.  Dispense: 30 tablet; Refill: 0    6.  Encounter for tobacco cessation counseling  Note: Discussed the risks of continued tobacco use.  Patient understands and will try to quit on her own    Return to clinic in 6 weeks for blood pressure check    Patient/Caregiver Education: Patient/Caregiver Education: There are no barriers to learning. Medical education done. Outcome: Patient verbalizes understanding. Patient is notified to call with any questions, complications, allergies, or worsening or changing symptoms.  Patient is to call with any side effects or complications from the treatments as a result of today.      Reviewed Past Medical History and   Patient Active Problem List   Diagnosis    Closed fracture of shaft of metacarpal bone(s)    Cognitive deficits    Crohn's disease of colon without complication (HCC)    Seizure (HCC)    Anxiety    Raynaud's phenomenon without gangrene    Excessive sleepiness    Intractable chronic migraine without aura and with status migrainosus     Class 1 obesity without serious comorbidity with body mass index (BMI) of 30.0 to 30.9 in adult    Gastroesophageal reflux disease    Cervical radiculopathy    CKD (chronic kidney disease) stage 3, GFR 30-59 ml/min ([facility])       Orders Placed This Encounter   Procedures    Hemoglobin A1C [E]     Standing Status:   Future     Standing Expiration Date:   7/30/2025     Order Specific Question:   Release to patient     Answer:   Immediate     Requested Prescriptions     Signed Prescriptions Disp Refills    HYDROcodone-acetaminophen (NORCO) 5-325 MG Oral Tab 20 tablet 0     Sig: Take 1 tablet by mouth daily as needed for Pain.    amLODIPine 5 MG Oral Tab 90 tablet 0     Sig: Take 1 tablet (5 mg total) by mouth at bedtime.    ALPRAZolam (XANAX) 0.5 MG Oral Tab 30 tablet 0     Sig: Take 1 tablet (0.5 mg total) by mouth nightly as needed.         Anh Rosado DO  CC:  Chief Complaint   Patient presents with    Follow - Up     BP         HPI:   Leila Alarcon is a 58-year-old female who presents for a follow-up    Hypertension: She has not been checking her blood pressure.  She took enalapril last time but she felt that the blood pressure dropped significantly.  She does not want to take any of that class of medicines and would like to try a different one if needed.  Sugars: She has been checking her sugar at home while fasting and it has been above 180.  She is concerned for diabetes and would like to have it checked  Anxiety/foot pain: She has been taking Norco very sparingly and is requesting Xanax    Past Medical History:    Anxiety state    Back problem    DEGENERATIVE DISC    Crohn disease ([facility])    Depression    Diverticulosis of large intestine    Dizziness    Migraines    Seizure disorder ([facility])       Past Surgical History:   Procedure Laterality Date    Bunionectomy Right 2023    Colon surgery      partial resection    Hernia repair      Coral localization wire 1 site left (cpt=19281) Left     benign ,  several yrs ago    Removal gallbladder      Repair of hammertoe,one Right 2023    Wrist fracture surgery      plate and screws  left       Social History:  Social History     Socioeconomic History    Marital status: Single   Tobacco Use    Smoking status: Every Day     Current packs/day: 0.50     Average packs/day: 0.5 packs/day for 43.6 years (21.8 ttl pk-yrs)     Types: Cigarettes     Start date: 1/1/1981    Smokeless tobacco: Never   Vaping Use    Vaping status: Never Used   Substance and Sexual Activity    Alcohol use: Not Currently    Drug use: Never   Other Topics Concern    Caffeine Concern No    Exercise Yes     Comment: walking     Social Determinants of Health      [facility]     Family History:  Family History   Family history unknown: Yes     Allergies:  Allergies   Allergen Reactions    Dust Mite Extract OTHER (SEE COMMENTS)    Seasonal OTHER (SEE COMMENTS)     Runny nose     Current Meds:  Current Outpatient Medications on File Prior to Visit   Medication Sig Dispense Refill    triamcinolone 0.1 % External Cream Use twice a day to affected area for 2 weeks then take a break for 2 weeks then apply again for 2 weeks 45 g 3    escitalopram (LEXAPRO) 20 MG Oral Tab Take one tablet daily 90 tablet 1    levETIRAcetam 500 MG Oral Tab Take 1 tablet (500 mg total) by mouth 2 (two) times daily. 180 tablet 3    SUMAtriptan 50 MG Oral Tab TAKE 1 TABLET BY MOUTH AT ONSET OF HEADACHE. MAY REPEAT ONCE AFTER 2 HOURS- TAKE MAXIMUM OF 2 TABLETS PER DAY 9 tablet 3    meclizine 25 MG Oral Tab Take 1 tablet (25 mg total) by mouth 3 (three) times daily as needed. 30 tablet 0    pantoprazole 40 MG Oral Tab EC Take 1 tablet (40 mg total) by mouth before breakfast. 90 tablet 1    budesonide 3 MG Oral Cap DR Particles Take 1 capsule (3 mg total) by mouth 3 (three) times daily before meals as needed. 180 capsule 1     Current Facility-Administered Medications on File Prior to Visit   Medication  Dose Route Frequency Provider Last Rate Last Admin    cyanocobalamin (Vitamin B12) 1000 MCG/ML injection 1,000 mcg  1,000 mcg Intramuscular Q30 Days Anh Rosado DO   1,000 mcg at 04/14/23 1230       Tobacco:  Social History     Tobacco Use   Smoking Status Every Day    Current packs/day: 0.50    Average packs/day: 0.5 packs/day for 43.6 years (21.8 ttl pk-yrs)    Types: Cigarettes    Start date: 1/1/1981   Smokeless Tobacco Never     E-Cigarettes/Vaping       Questions Responses    E-Cigarette Use Never User          E-Cigarette/Vaping Substances       Questions Responses    Nicotine No    THC No    CBD No    Flavoring No          E-Cigarette/Vaping Devices       Questions Responses    Disposable No    Pre-filled or Refillable Cartridge No    Refillable Tank No    Pre-filled Pod No           Tobacco cessation counseling for <3 minutes.     REVIEW OF SYSTEMS   Constitutional: no fatigue normal energy no weight changes   HENT: normal sinuses and no mouth issues   Eyes: . normal vision no eye pain   Respiratory: normal respirations no cough   Cardiovascular: no CP, or palpitations   Gastrointestinal: normal bowels and no abd pains   Genitourinary:  normal urination no hematuria, no frequency   Musculoskeletal: no pains in arms/legs, normal range of motion   Skin: no rashes or skin lesions that are new   Neurological:  no weakness, no numbness, normal gait   Hematological:  no bruises or bleeding   Psychiatric/Behavioral: normal mood no anxiety normal behavior     /90 (BP Location: Right arm, Patient Position: Sitting, Cuff Size: adult)   Pulse 82   Temp 98.1 °F (36.7 °C) (Temporal)   Resp 16   Ht 5' 3\" (1.6 m)   Wt 188 lb 6.4 oz (85.5 kg)   LMP  (LMP Unknown)   SpO2 100%   BMI 33.37 kg/m²     PHYSICAL EXAM:   Constitutional: Vital signs reviewed as noted, well developed, in no acute distress.   HENT: NCAT  Eyes: pupils reactive bilaterally  Cardiovascular: nl s1 s2 no m/r/g  Pulmonary/Chest: CTA  bilaterally with no wheezes  Extremities: no pedal edema   Neurological:  no weakness in UE and LE, reflexes are normal  Skin: no rashes or bruises on visualized skin, not undressed   Psychiatric:normal mood

## 2024-07-30 NOTE — PATIENT INSTRUCTIONS
I have ordered a new medication called amlodipine for your blood pressure. It may cause swelling in the legs    I have refilled your norco and xanax     I highly recommend to stop smoking     I have Ordered blood tests for you    See me back in 6 weeks for weight check but I will let you know sooner if your A1c is really elevated    I have ordered a lung cancer screening for you please see if it is covered through your insurance

## 2024-08-12 DIAGNOSIS — G43.711 INTRACTABLE CHRONIC MIGRAINE WITHOUT AURA AND WITH STATUS MIGRAINOSUS: ICD-10-CM

## 2024-08-12 RX ORDER — MECLIZINE HYDROCHLORIDE 25 MG/1
25 TABLET ORAL 3 TIMES DAILY PRN
Qty: 30 TABLET | Refills: 0 | Status: SHIPPED | OUTPATIENT
Start: 2024-08-12

## 2024-08-12 NOTE — TELEPHONE ENCOUNTER
PASS    Future Appointments   Date Time Provider Department Center   8/29/2024  9:00 AM Alejandra Luque MD [facility] [address] [facility] [address]   9/5/2024 10:00 AM Anh Rosado DO [facility] [address] [facility] [place]

## 2024-08-29 ENCOUNTER — OFFICE VISIT (OUTPATIENT)
Dept: NEUROLOGY | Facility: CLINIC | Age: 59
End: 2024-08-29
Payer: MEDICAID

## 2024-08-29 VITALS
RESPIRATION RATE: 16 BRPM | WEIGHT: 185 LBS | BODY MASS INDEX: 33 KG/M2 | SYSTOLIC BLOOD PRESSURE: 120 MMHG | HEART RATE: 79 BPM | DIASTOLIC BLOOD PRESSURE: 84 MMHG

## 2024-08-29 DIAGNOSIS — G43.711 INTRACTABLE CHRONIC MIGRAINE WITHOUT AURA AND WITH STATUS MIGRAINOSUS: ICD-10-CM

## 2024-08-29 DIAGNOSIS — M54.12 CERVICAL RADICULOPATHY: Primary | ICD-10-CM

## 2024-08-29 DIAGNOSIS — R42 DIZZINESS: ICD-10-CM

## 2024-08-29 DIAGNOSIS — G40.909 SEIZURE DISORDER (HCC): ICD-10-CM

## 2024-08-29 PROBLEM — J41.0 SMOKERS' COUGH (HCC): Chronic | Status: ACTIVE | Noted: 2024-08-29

## 2024-08-29 PROCEDURE — 99214 OFFICE O/P EST MOD 30 MIN: CPT | Performed by: OTHER

## 2024-08-29 RX ORDER — ERENUMAB-AOOE 70 MG/ML
70 INJECTION SUBCUTANEOUS
Qty: 1 ML | Refills: 11 | Status: SHIPPED | OUTPATIENT
Start: 2024-08-29 | End: 2025-08-24

## 2024-08-29 RX ORDER — GABAPENTIN 300 MG/1
300 CAPSULE ORAL 3 TIMES DAILY
COMMUNITY

## 2024-08-29 RX ORDER — ERENUMAB-AOOE 70 MG/ML
INJECTION SUBCUTANEOUS
COMMUNITY
Start: 2024-08-12 | End: 2024-08-29

## 2024-08-29 RX ORDER — MECLIZINE HYDROCHLORIDE 25 MG/1
25 TABLET ORAL 3 TIMES DAILY PRN
Qty: 30 TABLET | Refills: 3 | Status: SHIPPED | OUTPATIENT
Start: 2024-08-29

## 2024-08-29 RX ORDER — SUMATRIPTAN 50 MG/1
TABLET, FILM COATED ORAL
Qty: 9 TABLET | Refills: 3 | Status: SHIPPED | OUTPATIENT
Start: 2024-08-29

## 2024-08-29 NOTE — PROGRESS NOTES
[facility] Neurology Outpatient Progress Note  Date of service: 8/29/2024    Assessment:     ICD-10-CM    1. Cervical radiculopathy  M54.12 Pain Management Referral - In Network      2. Intractable chronic migraine without aura and with status migrainosus  G43.711 meclizine 25 MG Oral Tab      3. Dizziness  R42       4. Seizure disorder (HCC)  G40.909           Plan:      Procedures    Pain Management Referral - In Network     Cont aimovig for migraine prevention  Cont keppra as it may also help migraine  Imitrex, meclizine prn, refills sent  PCP to follow  See orders and medications filed with this encounter. The patient indicates understanding of these issues and agrees with the plan.  RTC 6 months  Pt should go ER for any new or worsening symptoms.    Subjective:   History:  Patient here to follow up regarding migraine and dizziness, neck pain seems worsening with radiating to right arm, has tried therapy, medication did not help. C spine MRI showed multilevel degenerative changes.  Pt decided to stay on Keppra, no side effects reported.  She saw Dr Ojeda (epileptologist) at [facility] before, has not followed up recently.    History/Other:   REVIEW OF SYSTEMS:  A 10-point system was reviewed. Pertinent positives and negatives are noted as above       Current Outpatient Medications:     gabapentin 300 MG Oral Cap, Take 1 capsule (300 mg total) by mouth 3 (three) times daily., Disp: , Rfl:     SUMAtriptan 50 MG Oral Tab, TAKE 1 TABLET BY MOUTH AT ONSET OF HEADACHE. MAY REPEAT ONCE AFTER 2 HOURS- TAKE MAXIMUM OF 2 TABLETS PER DAY, Disp: 9 tablet, Rfl: 3    meclizine 25 MG Oral Tab, Take 1 tablet (25 mg total) by mouth 3 (three) times daily as needed for Dizziness., Disp: 30 tablet, Rfl: 3    erenumab-aooe (AIMOVIG) 70 MG/ML Subcutaneous, Inject 1 mL (70 mg total) into the skin every 30 (thirty) days., Disp: 1 mL, Rfl: 11    amLODIPine 5 MG Oral Tab, Take 1 tablet (5 mg total) by mouth at bedtime., Disp: 90 tablet, Rfl: 0     ALPRAZolam (XANAX) 0.5 MG Oral Tab, Take 1 tablet (0.5 mg total) by mouth nightly as needed., Disp: 30 tablet, Rfl: 0    triamcinolone 0.1 % External Cream, Use twice a day to affected area for 2 weeks then take a break for 2 weeks then apply again for 2 weeks, Disp: 45 g, Rfl: 3    escitalopram (LEXAPRO) 20 MG Oral Tab, Take one tablet daily, Disp: 90 tablet, Rfl: 1    levETIRAcetam 500 MG Oral Tab, Take 1 tablet (500 mg total) by mouth 2 (two) times daily., Disp: 180 tablet, Rfl: 3    pantoprazole 40 MG Oral Tab EC, Take 1 tablet (40 mg total) by mouth before breakfast., Disp: 90 tablet, Rfl: 1    budesonide 3 MG Oral Cap DR Particles, Take 1 capsule (3 mg total) by mouth 3 (three) times daily before meals as needed., Disp: 180 capsule, Rfl: 1  Allergies:  Allergies   Allergen Reactions    Dust Mite Extract OTHER (SEE COMMENTS)    Seasonal OTHER (SEE COMMENTS)     Runny nose     Past Medical History:    Anxiety state    Back problem    DEGENERATIVE DISC    Crohn disease (HCC)    Depression    Diverticulosis of large intestine    Dizziness    Migraines    Seizure disorder (HCC)     Past Surgical History:   Procedure Laterality Date    Bunionectomy Right 2023    Colon surgery      partial resection    Hernia repair      Coral localization wire 1 site left (cpt=19281) Left     benign , several yrs ago    Removal gallbladder      Repair of hammertoe,one Right 2023    Wrist fracture surgery      plate and screws  left     Social History:  Social History     Tobacco Use    Smoking status: Every Day     Current packs/day: 0.50     Average packs/day: 0.5 packs/day for 43.7 years (21.8 ttl pk-yrs)     Types: Cigarettes     Start date: 1/1/1981    Smokeless tobacco: Never   Substance Use Topics    Alcohol use: Not Currently     Family History   Family history unknown: Yes   Patient denies any pertinent family history associated with the current problem    Objective:   Neurological Examination:  /84   Pulse 79   Resp  16   Wt 185 lb (83.9 kg)   LMP  (LMP Unknown)   BMI 32.77 kg/m²   Mental status: A & O X 3  Language: no aphasia  Speech: no dysarthria  CN II-XII: intact   Motor strength: 5/5 all extremities  Tone: normal  Coordination: normal  Sensory: symmetric  Gait: normal    Test reviewed on 8/29/2024      Alejandra \"Adrien\" MD [facility]  [facility]  8/29/2024, 10:29 AM  CC: Anh Rosado DO

## 2024-08-29 NOTE — PATIENT INSTRUCTIONS
Refill policies:    Allow 2-3 business days for refills; controlled substances may take longer.  Contact your pharmacy at least 5 days prior to running out of medication and have them send an electronic request or submit request through the “request refill” option in your [employer] account.  Refills are not addressed on weekends; covering physicians do not authorize routine medications on weekends.  No narcotics or controlled substances are refilled after noon on Fridays or by on call physicians.  By law, narcotics must be electronically prescribed.  A 30 day supply with no refills is the maximum allowed.  If your prescription is due for a refill, you may be due for a follow up appointment.  To best provide you care, patients receiving routine medications need to be seen at least once a year.  Patients receiving narcotic/controlled substance medications need to be seen at least once every 3 months.  In the event that your preferred pharmacy does not have the requested medication in stock (e.g. Backordered), it is your responsibility to find another pharmacy that has the requested medication available.  We will gladly send a new prescription to that pharmacy at your request.    Scheduling Tests:    If your physician has ordered radiology tests such as MRI or CT scans, please contact Central Scheduling at [phone] right away to schedule the test.  Once scheduled, the [facility] Centralized Referral Team will work with your insurance carrier to obtain pre-certification or prior authorization.  Depending on your insurance carrier, approval may take 3-10 days.  It is highly recommended patients assure they have received an authorization before having a test performed.  If test is done without insurance authorization, patient may be responsible for the entire amount billed.      Precertification and Prior Authorizations:  If your physician has recommended that you have a procedure or additional testing performed the [facility]  Centralized Referral Team will contact your insurance carrier to obtain pre-certification or prior authorization.    You are strongly encouraged to contact your insurance carrier to verify that your procedure/test has been approved and is a COVERED benefit.  Although the [facility] Centralized Referral Team does its due diligence, the insurance carrier gives the disclaimer that \"Although the procedure is authorized, this does not guarantee payment.\"    Ultimately the patient is responsible for payment.   Thank you for your understanding in this matter.  Paperwork Completion:  If you require FMLA or disability paperwork for your recovery, please make sure to either drop it off or have it faxed to our office at [phone]. Be sure the form has your name and date of birth on it.  The form will be faxed to our Forms Department and they will complete it for you.  There is a 25$ fee for all forms that need to be filled out.  Please be aware there is a 10-14 day turnaround time.  You will need to sign a release of information (DOMINGA) form if your paperwork does not come with one.  You may call the Forms Department with any questions at [phone].  Their fax number is [phone].

## 2024-09-04 ENCOUNTER — OFFICE VISIT (OUTPATIENT)
Dept: PAIN CLINIC | Facility: CLINIC | Age: 59
End: 2024-09-04
Payer: MEDICARE

## 2024-09-04 ENCOUNTER — HOSPITAL ENCOUNTER (OUTPATIENT)
Dept: GENERAL RADIOLOGY | Facility: HOSPITAL | Age: 59
Discharge: HOME | End: 2024-09-04
Attending: PHYSICIAN ASSISTANT
Payer: MEDICARE

## 2024-09-04 VITALS — HEART RATE: 90 BPM | DIASTOLIC BLOOD PRESSURE: 76 MMHG | SYSTOLIC BLOOD PRESSURE: 132 MMHG | OXYGEN SATURATION: 96 %

## 2024-09-04 DIAGNOSIS — M54.16 LUMBAR RADICULITIS: ICD-10-CM

## 2024-09-04 DIAGNOSIS — M54.12 CERVICAL RADICULITIS: Primary | ICD-10-CM

## 2024-09-04 PROCEDURE — 3075F SYST BP GE 130 - 139MM HG: CPT | Performed by: PHYSICIAN ASSISTANT

## 2024-09-04 PROCEDURE — 99204 OFFICE O/P NEW MOD 45 MIN: CPT | Performed by: PHYSICIAN ASSISTANT

## 2024-09-04 PROCEDURE — 3078F DIAST BP <80 MM HG: CPT | Performed by: PHYSICIAN ASSISTANT

## 2024-09-04 PROCEDURE — 72114 X-RAY EXAM L-S SPINE BENDING: CPT | Performed by: PHYSICIAN ASSISTANT
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
## 2024-09-04 NOTE — PROGRESS NOTES
Patient presents in office today with reported pain in neck radiating down right arm and right leg    Current pain level reported = 7/10    Last reported dose of NA today    Narcotic Contract renewal NA  Urine Drug screen NA

## 2024-09-04 NOTE — PATIENT INSTRUCTIONS
Refill policies:    Allow 2-3 business days for refills; controlled substances may take longer.  Contact your pharmacy at least 5 days prior to running out of medication and have them send an electronic request or submit request through the “request refill” option in your [employer] account.  Refills are not addressed on weekends; covering physicians do not authorize routine medications on weekends.  No narcotics or controlled substances are refilled after noon on Fridays or by on call physicians.  By law, narcotics must be electronically prescribed.  A 30 day supply with no refills is the maximum allowed.  If your prescription is due for a refill, you may be due for a follow up appointment.  To best provide you care, patients receiving routine medications need to be seen at least once a year.  Patients receiving narcotic/controlled substance medications need to be seen at least once every 3 months.  In the event that your preferred pharmacy does not have the requested medication in stock (e.g. Backordered), it is your responsibility to find another pharmacy that has the requested medication available.  We will gladly send a new prescription to that pharmacy at your request.    Scheduling Tests:    If your physician has ordered radiology tests such as MRI or CT scans, please contact Central Scheduling at [phone] right away to schedule the test.  Once scheduled, the [facility] Centralized Referral Team will work with your insurance carrier to obtain pre-certification or prior authorization.  Depending on your insurance carrier, approval may take 3-10 days.  It is highly recommended patients assure they have received an authorization before having a test performed.  If test is done without insurance authorization, patient may be responsible for the entire amount billed.      Precertification and Prior Authorizations:  If your physician has recommended that you have a procedure or additional testing performed the [facility]  Centralized Referral Team will contact your insurance carrier to obtain pre-certification or prior authorization.    You are strongly encouraged to contact your insurance carrier to verify that your procedure/test has been approved and is a COVERED benefit.  Although the [facility] Centralized Referral Team does its due diligence, the insurance carrier gives the disclaimer that \"Although the procedure is authorized, this does not guarantee payment.\"    Ultimately the patient is responsible for payment.   Thank you for your understanding in this matter.  Paperwork Completion:  If you require FMLA or disability paperwork for your recovery, please make sure to either drop it off or have it faxed to our office at [phone]. Be sure the form has your name and date of birth on it.  The form will be faxed to our Forms Department and they will complete it for you.  There is a 25$ fee for all forms that need to be filled out.  Please be aware there is a 10-14 day turnaround time.  You will need to sign a release of information (DOMINGA) form if your paperwork does not come with one.  You may call the Forms Department with any questions at [phone].  Their fax number is [phone].
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
## 2024-09-05 ENCOUNTER — TELEPHONE (OUTPATIENT)
Dept: INTERNAL MEDICINE CLINIC | Facility: CLINIC | Age: 59
End: 2024-09-05

## 2024-09-05 ENCOUNTER — OFFICE VISIT (OUTPATIENT)
Dept: INTERNAL MEDICINE CLINIC | Facility: CLINIC | Age: 59
End: 2024-09-05
Payer: MEDICARE

## 2024-09-05 VITALS
DIASTOLIC BLOOD PRESSURE: 82 MMHG | WEIGHT: 182.63 LBS | HEART RATE: 84 BPM | BODY MASS INDEX: 32.36 KG/M2 | TEMPERATURE: 98 F | RESPIRATION RATE: 16 BRPM | HEIGHT: 63 IN | SYSTOLIC BLOOD PRESSURE: 136 MMHG | OXYGEN SATURATION: 98 %

## 2024-09-05 DIAGNOSIS — R73.03 PREDIABETES: ICD-10-CM

## 2024-09-05 DIAGNOSIS — R56.9 SEIZURE (HCC): ICD-10-CM

## 2024-09-05 DIAGNOSIS — M54.12 CERVICAL RADICULOPATHY: ICD-10-CM

## 2024-09-05 DIAGNOSIS — K21.9 GASTROESOPHAGEAL REFLUX DISEASE, UNSPECIFIED WHETHER ESOPHAGITIS PRESENT: ICD-10-CM

## 2024-09-05 DIAGNOSIS — I10 PRIMARY HYPERTENSION: Primary | ICD-10-CM

## 2024-09-05 DIAGNOSIS — E66.9 CLASS 1 OBESITY WITHOUT SERIOUS COMORBIDITY WITH BODY MASS INDEX (BMI) OF 30.0 TO 30.9 IN ADULT, UNSPECIFIED OBESITY TYPE: ICD-10-CM

## 2024-09-05 DIAGNOSIS — G43.711 INTRACTABLE CHRONIC MIGRAINE WITHOUT AURA AND WITH STATUS MIGRAINOSUS: ICD-10-CM

## 2024-09-05 DIAGNOSIS — I73.00 RAYNAUD'S PHENOMENON WITHOUT GANGRENE: ICD-10-CM

## 2024-09-05 DIAGNOSIS — E53.8 VITAMIN B12 DEFICIENCY: ICD-10-CM

## 2024-09-05 DIAGNOSIS — K50.10 CROHN'S DISEASE OF COLON WITHOUT COMPLICATION (HCC): ICD-10-CM

## 2024-09-05 DIAGNOSIS — F41.9 ANXIETY: ICD-10-CM

## 2024-09-05 PROBLEM — N18.30 CKD (CHRONIC KIDNEY DISEASE) STAGE 3, GFR 30-59 ML/MIN (HCC): Chronic | Status: RESOLVED | Noted: 2024-04-25 | Resolved: 2024-09-05

## 2024-09-05 PROBLEM — J41.0 SMOKERS' COUGH (HCC): Chronic | Status: RESOLVED | Noted: 2024-08-29 | Resolved: 2024-09-05

## 2024-09-05 PROCEDURE — 3079F DIAST BP 80-89 MM HG: CPT | Performed by: INTERNAL MEDICINE

## 2024-09-05 PROCEDURE — 3075F SYST BP GE 130 - 139MM HG: CPT | Performed by: INTERNAL MEDICINE

## 2024-09-05 PROCEDURE — 99214 OFFICE O/P EST MOD 30 MIN: CPT | Performed by: INTERNAL MEDICINE

## 2024-09-05 PROCEDURE — 3008F BODY MASS INDEX DOCD: CPT | Performed by: INTERNAL MEDICINE

## 2024-09-05 PROCEDURE — 96372 THER/PROPH/DIAG INJ SC/IM: CPT | Performed by: INTERNAL MEDICINE

## 2024-09-05 RX ORDER — CYANOCOBALAMIN 1000 UG/ML
1000 INJECTION, SOLUTION INTRAMUSCULAR; SUBCUTANEOUS ONCE
Status: COMPLETED | OUTPATIENT
Start: 2024-09-05 | End: 2024-09-05

## 2024-09-05 RX ADMIN — CYANOCOBALAMIN 1000 MCG: 1000 INJECTION, SOLUTION INTRAMUSCULAR; SUBCUTANEOUS at 10:35:00

## 2024-09-05 NOTE — PATIENT INSTRUCTIONS
You received the vitamin B12 shot today. Come every month for the shot through our nurse    Continue your medications    Start PT    Please see our GI doctor for Crohn's as well. I have provided you with a new referral     See me back in 6 months

## 2024-09-05 NOTE — PROGRESS NOTES
Patient Office Visit    ASSESSMENT AND PLAN:   1. Primary hypertension  Note: continue with amlodipine     2. Anxiety  Note: continue lexapro. Takes xanax sparingly     3. Class 1 obesity without serious comorbidity with body mass index (BMI) of 30.0 to 30.9 in adult, unspecified obesity type  Note: continue with diet and lifestyle changes     4. Intractable chronic migraine without aura and with status migrainosus  Note: on aimovig and sumatriptan as needed. Taking keppra for migraines as well. Follows with the neurologist     5. Gastroesophageal reflux disease, unspecified whether esophagitis present  Note: continue pantoprazole     6. Seizure (HCC)  Note: follows with neurology and is on keppra. No recent seizure activity in several years     7. Raynaud's phenomenon without gangrene  Note: on amlodipine    8. Cervical radiculopathy  Note: continue gabapentin and follows with pain specialist. She will start PT soon     9. Vitamin B12 deficiency  Note: recommended to schedule appointment every month  - cyanocobalamin (Vitamin B12) 1000 MCG/ML injection 1,000 mcg    10. Crohn's disease of colon without complication (HCC)  Note: on budesonide and reminded patient to follow up with GI     11. Prediabetes  Note: diet controlled     RTC in 6 months         Patient/Caregiver Education: Patient/Caregiver Education: There are no barriers to learning. Medical education done. Outcome: Patient verbalizes understanding. Patient is notified to call with any questions, complications, allergies, or worsening or changing symptoms.  Patient is to call with any side effects or complications from the treatments as a result of today.      Reviewed Past Medical History and   Patient Active Problem List   Diagnosis    Closed fracture of shaft of metacarpal bone(s)    Cognitive deficits    Crohn's disease of colon without complication (HCC)    Seizure (HCC)    Anxiety    Raynaud's phenomenon without gangrene    Excessive sleepiness     Intractable chronic migraine without aura and with status migrainosus    Class 1 obesity without serious comorbidity with body mass index (BMI) of 30.0 to 30.9 in adult    Gastroesophageal reflux disease    Primary hypertension    Cervical radiculopathy    Vitamin B12 deficiency       No orders of the defined types were placed in this encounter.    Requested Prescriptions      No prescriptions requested or ordered in this encounter         Anh Rosado DO  CC:  Chief Complaint   Patient presents with    Follow - Up         HPI:   Leila Alarcon is a 58 year old female who presents for a follow up    Hypertension: tolerating amlodipine well  Vitamin B12 deficiency: has not received the shot in some time  Cervical radiculopathy: will start PT soon and hoping to get injections  GERD/Anxiety/migraine: stable     Past Medical History:    Anxiety state    Back problem    DEGENERATIVE DISC    Crohn disease (HCC)    Depression    Diverticulosis of large intestine    Dizziness    Migraines    Seizure disorder (HCC)       Past Surgical History:   Procedure Laterality Date    Bunionectomy Right 2023    Colon surgery      partial resection    Hernia repair      Coral localization wire 1 site left (cpt=19281) Left     benign , several yrs ago    Removal gallbladder      Repair of hammertoe,one Right 2023    Wrist fracture surgery      plate and screws  left       Social History:  Social History     Socioeconomic History    Marital status: Single   Tobacco Use    Smoking status: Every Day     Current packs/day: 0.50     Average packs/day: 0.5 packs/day for 43.7 years (21.8 ttl pk-yrs)     Types: Cigarettes     Start date: 1/1/1981    Smokeless tobacco: Never   Vaping Use    Vaping status: Never Used   Substance and Sexual Activity    Alcohol use: Not Currently    Drug use: Never   Other Topics Concern    Caffeine Concern Yes     Comment: occ    Exercise Yes     Comment: walking     Social Determinants of Health       [facility]     Family History:  Family History   Family history unknown: Yes     Allergies:  Allergies   Allergen Reactions    Dust Mite Extract OTHER (SEE COMMENTS)    Seasonal OTHER (SEE COMMENTS)     Runny nose     Current Meds:  Current Outpatient Medications on File Prior to Visit   Medication Sig Dispense Refill    gabapentin 300 MG Oral Cap Take 1 capsule (300 mg total) by mouth 3 (three) times daily.      SUMAtriptan 50 MG Oral Tab TAKE 1 TABLET BY MOUTH AT ONSET OF HEADACHE. MAY REPEAT ONCE AFTER 2 HOURS- TAKE MAXIMUM OF 2 TABLETS PER DAY 9 tablet 3    meclizine 25 MG Oral Tab Take 1 tablet (25 mg total) by mouth 3 (three) times daily as needed for Dizziness. 30 tablet 3    erenumab-aooe (AIMOVIG) 70 MG/ML Subcutaneous Inject 1 mL (70 mg total) into the skin every 30 (thirty) days. 1 mL 11    amLODIPine 5 MG Oral Tab Take 1 tablet (5 mg total) by mouth at bedtime. 90 tablet 0    ALPRAZolam (XANAX) 0.5 MG Oral Tab Take 1 tablet (0.5 mg total) by mouth nightly as needed. 30 tablet 0    triamcinolone 0.1 % External Cream Use twice a day to affected area for 2 weeks then take a break for 2 weeks then apply again for 2 weeks 45 g 3    escitalopram (LEXAPRO) 20 MG Oral Tab Take one tablet daily 90 tablet 1    levETIRAcetam 500 MG Oral Tab Take 1 tablet (500 mg total) by mouth 2 (two) times daily. 180 tablet 3    pantoprazole 40 MG Oral Tab EC Take 1 tablet (40 mg total) by mouth before breakfast. 90 tablet 1    budesonide 3 MG Oral Cap DR Particles Take 1 capsule (3 mg total) by mouth 3 (three) times daily before meals as needed. 180 capsule 1     Current Facility-Administered Medications on File Prior to Visit   Medication Dose Route Frequency Provider Last Rate Last Admin    cyanocobalamin (Vitamin B12) 1000 MCG/ML injection 1,000 mcg  1,000 mcg Intramuscular Q30 Days Anh Rosado DO   1,000 mcg at 04/14/23 1230         REVIEW OF SYSTEMS    Constitutional: no fatigue normal energy no weight changes   HENT: normal sinuses and no mouth issues   Eyes: . normal vision no eye pain   Respiratory: normal respirations no cough   Cardiovascular: no CP, or palpitations   Gastrointestinal: normal bowels and no abd pains   Genitourinary:  normal urination no hematuria, no frequency   Musculoskeletal: no pains in arms/legs, normal range of motion   Skin: no rashes or skin lesions that are new   Neurological:  no weakness, no numbness, normal gait   Hematological:  no bruises or bleeding   Psychiatric/Behavioral: normal mood no anxiety normal behavior     /82 (BP Location: Right arm, Patient Position: Sitting, Cuff Size: adult)   Pulse 84   Temp 97.7 °F (36.5 °C) (Temporal)   Resp 16   Ht 5' 3\" (1.6 m)   Wt 182 lb 9.6 oz (82.8 kg)   LMP  (LMP Unknown)   SpO2 98%   BMI 32.35 kg/m²     PHYSICAL EXAM:   Constitutional: Vital signs reviewed as noted, well developed, in no acute distress.   HENT: NCAT  Eyes: pupils reactive bilaterally  Cardiovascular: nl s1 s2 no m/r/g  Pulmonary/Chest: CTA bilaterally with no wheezes  Extremities: no pedal edema   Neurological:  no weakness in UE and LE, reflexes are normal  Skin: no rashes or bruises on visualized skin, not undressed   Psychiatric:normal mood

## 2024-09-09 ENCOUNTER — TELEPHONE (OUTPATIENT)
Dept: INTERNAL MEDICINE CLINIC | Facility: CLINIC | Age: 59
End: 2024-09-09

## 2024-09-09 DIAGNOSIS — K04.7 DENTAL INFECTION: Primary | ICD-10-CM

## 2024-09-09 RX ORDER — AMOXICILLIN 500 MG/1
500 CAPSULE ORAL 2 TIMES DAILY
Qty: 14 CAPSULE | Refills: 0 | Status: SHIPPED | OUTPATIENT
Start: 2024-09-09 | End: 2024-09-16

## 2024-09-09 NOTE — TELEPHONE ENCOUNTER
Patient requesting an antibiotic for a tooth infection because she doesn't want to drive 45 minutes to our clinic. She states she doesn't have a dentist. Please advise on this request

## 2024-09-09 NOTE — TELEPHONE ENCOUNTER
I have sent antibiotics to the pharmacy. If symptoms worsen then needs to be seen in the UC/ER.  Anh Rosado DO

## 2024-09-10 ENCOUNTER — APPOINTMENT (OUTPATIENT)
Dept: ULTRASOUND IMAGING | Facility: HOSPITAL | Age: 59
End: 2024-09-10
Attending: EMERGENCY MEDICINE
Payer: MEDICARE

## 2024-09-10 ENCOUNTER — HOSPITAL ENCOUNTER (EMERGENCY)
Facility: HOSPITAL | Age: 59
Discharge: HOME | End: 2024-09-10
Attending: EMERGENCY MEDICINE
Payer: MEDICARE

## 2024-09-10 VITALS
DIASTOLIC BLOOD PRESSURE: 98 MMHG | OXYGEN SATURATION: 96 % | TEMPERATURE: 98 F | HEIGHT: 63 IN | BODY MASS INDEX: 32.07 KG/M2 | WEIGHT: 181 LBS | SYSTOLIC BLOOD PRESSURE: 160 MMHG | HEART RATE: 80 BPM | RESPIRATION RATE: 18 BRPM

## 2024-09-10 DIAGNOSIS — R53.83 OTHER FATIGUE: ICD-10-CM

## 2024-09-10 DIAGNOSIS — M79.89 SWELLING OF RIGHT UPPER EXTREMITY: Primary | ICD-10-CM

## 2024-09-10 LAB
ALBUMIN SERPL-MCNC: 4.6 G/DL (ref 3.2–4.8)
ALBUMIN/GLOB SERPL: 2.1 {RATIO} (ref 1–2)
ALP LIVER SERPL-CCNC: 98 U/L
ALT SERPL-CCNC: <7 U/L
ANION GAP SERPL CALC-SCNC: 7 MMOL/L (ref 0–18)
AST SERPL-CCNC: 13 U/L (ref ?–34)
BASOPHILS # BLD AUTO: 0.04 X10(3) UL (ref 0–0.2)
BASOPHILS NFR BLD AUTO: 0.5 %
BILIRUB SERPL-MCNC: 0.3 MG/DL (ref 0.3–1.2)
BILIRUB UR QL STRIP.AUTO: NEGATIVE
BUN BLD-MCNC: 10 MG/DL (ref 9–23)
CALCIUM BLD-MCNC: 9.5 MG/DL (ref 8.7–10.4)
CHLORIDE SERPL-SCNC: 109 MMOL/L (ref 98–112)
CO2 SERPL-SCNC: 25 MMOL/L (ref 21–32)
COLOR UR AUTO: YELLOW
CREAT BLD-MCNC: 0.88 MG/DL
EGFRCR SERPLBLD CKD-EPI 2021: 77 ML/MIN/1.73M2 (ref 60–?)
EOSINOPHIL # BLD AUTO: 0.21 X10(3) UL (ref 0–0.7)
EOSINOPHIL NFR BLD AUTO: 2.7 %
ERYTHROCYTE [DISTWIDTH] IN BLOOD BY AUTOMATED COUNT: 13.1 %
GLOBULIN PLAS-MCNC: 2.2 G/DL (ref 2–3.5)
GLUCOSE BLD-MCNC: 100 MG/DL (ref 70–99)
GLUCOSE UR STRIP.AUTO-MCNC: NORMAL MG/DL
HCT VFR BLD AUTO: 43.7 %
HGB BLD-MCNC: 15.1 G/DL
IMM GRANULOCYTES # BLD AUTO: 0.05 X10(3) UL (ref 0–1)
IMM GRANULOCYTES NFR BLD: 0.6 %
KETONES UR STRIP.AUTO-MCNC: NEGATIVE MG/DL
LEUKOCYTE ESTERASE UR QL STRIP.AUTO: 25
LYMPHOCYTES # BLD AUTO: 1.52 X10(3) UL (ref 1–4)
LYMPHOCYTES NFR BLD AUTO: 19.4 %
MCH RBC QN AUTO: 30.3 PG (ref 26–34)
MCHC RBC AUTO-ENTMCNC: 34.6 G/DL (ref 31–37)
MCV RBC AUTO: 87.8 FL
MONOCYTES # BLD AUTO: 0.7 X10(3) UL (ref 0.1–1)
MONOCYTES NFR BLD AUTO: 8.9 %
NEUTROPHILS # BLD AUTO: 5.31 X10 (3) UL (ref 1.5–7.7)
NEUTROPHILS # BLD AUTO: 5.31 X10(3) UL (ref 1.5–7.7)
NEUTROPHILS NFR BLD AUTO: 67.9 %
NITRITE UR QL STRIP.AUTO: NEGATIVE
OSMOLALITY SERPL CALC.SUM OF ELEC: 291 MOSM/KG (ref 275–295)
PH UR STRIP.AUTO: 7 [PH] (ref 5–8)
PLATELET # BLD AUTO: 279 10(3)UL (ref 150–450)
POTASSIUM SERPL-SCNC: 4.3 MMOL/L (ref 3.5–5.1)
PROT SERPL-MCNC: 6.8 G/DL (ref 5.7–8.2)
PROT UR STRIP.AUTO-MCNC: 20 MG/DL
RBC # BLD AUTO: 4.98 X10(6)UL
RBC UR QL AUTO: NEGATIVE
SODIUM SERPL-SCNC: 141 MMOL/L (ref 136–145)
SP GR UR STRIP.AUTO: 1.02 (ref 1–1.03)
UROBILINOGEN UR STRIP.AUTO-MCNC: NORMAL MG/DL
WBC # BLD AUTO: 7.8 X10(3) UL (ref 4–11)

## 2024-09-10 PROCEDURE — 81001 URINALYSIS AUTO W/SCOPE: CPT | Performed by: EMERGENCY MEDICINE

## 2024-09-10 PROCEDURE — 99284 EMERGENCY DEPT VISIT MOD MDM: CPT

## 2024-09-10 PROCEDURE — 85025 COMPLETE CBC W/AUTO DIFF WBC: CPT | Performed by: EMERGENCY MEDICINE

## 2024-09-10 PROCEDURE — 99285 EMERGENCY DEPT VISIT HI MDM: CPT

## 2024-09-10 PROCEDURE — 36415 COLL VENOUS BLD VENIPUNCTURE: CPT

## 2024-09-10 PROCEDURE — 80053 COMPREHEN METABOLIC PANEL: CPT | Performed by: EMERGENCY MEDICINE

## 2024-09-10 PROCEDURE — 87086 URINE CULTURE/COLONY COUNT: CPT | Performed by: EMERGENCY MEDICINE

## 2024-09-10 PROCEDURE — 93971 EXTREMITY STUDY: CPT | Performed by: EMERGENCY MEDICINE

## 2024-09-10 NOTE — ED PROVIDER NOTES
Patient Seen in: [facility] Emergency Department      History     Chief Complaint   Patient presents with    Swelling    Weakness     Stated Complaint: generalized weakness, pt reports swelling to face and right arm    Subjective:   HPI    Patient is a 59-year-old female with a history of migraine seizure disorder Crohn's disease among other medical problems outlined below presents to ED for evaluation for multiple plaints.  Patient reports that she is noted some swelling to the left side of her face.  Does admit to poor dentition.  Also right arm is sore in the appears to be slightly more swollen.  No shortness of breath or chest pain or lower extremity symptoms.  Although does describe generalized weakness fatigue.  No fever chills or any constitutional symptoms otherwise.  No vomiting diarrhea abdominal pain.  No blood per orifice.  No headache.  No other complaints.    Objective:   Past Medical History:    Anxiety state    Back problem    DEGENERATIVE DISC    Crohn disease (HCC)    Depression    Diverticulosis of large intestine    Dizziness    Migraines    Seizure disorder (HCC)              Past Surgical History:   Procedure Laterality Date    Bunionectomy Right 2023    Colon surgery      partial resection    Hernia repair      Coral localization wire 1 site left (cpt=19281) Left     benign , several yrs ago    Removal gallbladder      Repair of hammertoe,one Right 2023    Wrist fracture surgery      plate and screws  left                Social History     Socioeconomic History    Marital status: Single   Tobacco Use    Smoking status: Every Day     Current packs/day: 0.50     Average packs/day: 0.5 packs/day for 43.7 years (21.8 ttl pk-yrs)     Types: Cigarettes     Start date: 1/1/1981    Smokeless tobacco: Never   Vaping Use    Vaping status: Never Used   Substance and Sexual Activity    Alcohol use: Not Currently    Drug use: Never   Other Topics Concern    Caffeine Concern Yes     Comment: occ     Exercise Yes     Comment: walking     Social Determinants of Health      [facility]              Review of Systems    Positive for stated Chief Complaint: Swelling and Weakness    Other systems are as noted in HPI.  Constitutional and vital signs reviewed.      All other systems reviewed and negative except as noted above.    Physical Exam     ED Triage Vitals [09/10/24 1035]   BP (!) 162/97   Pulse 91   Resp 18   Temp 97.9 °F (36.6 °C)   Temp src Temporal   SpO2 96 %   O2 Device        Current Vitals:   Vital Signs  BP: (!) 162/97  Pulse: 91  Resp: 18  Temp: 97.9 °F (36.6 °C)  Temp src: Temporal    Oxygen Therapy  SpO2: 96 %            Physical Exam  Vitals and nursing note reviewed.   Constitutional:       General: She is not in acute distress.     Appearance: She is well-developed. She is not toxic-appearing.   HENT:      Head: Normocephalic and atraumatic.      Comments: There is some subtle swelling noted along the angle of the left lower jaw.  Poor dentition noted throughout.  Eyes:      General: No scleral icterus.     Conjunctiva/sclera: Conjunctivae normal.   Cardiovascular:      Rate and Rhythm: Normal rate.   Pulmonary:      Effort: Pulmonary effort is normal. No respiratory distress.   Abdominal:      General: There is no distension.      Tenderness: There is no abdominal tenderness.   Musculoskeletal:         General: No tenderness. Normal range of motion.      Cervical back: Normal range of motion and neck supple.      Right lower leg: No edema.      Left lower leg: No edema.   Skin:     General: Skin is warm and dry.      Findings: No rash.   Neurological:      Mental Status: She is alert and oriented to person, place, and time.      Motor: No abnormal muscle tone.      Coordination: Coordination normal.   Psychiatric:         Behavior: Behavior normal.              ED Course     Labs Reviewed   COMP METABOLIC PANEL (14) - Abnormal; Notable for the  following components:       Result Value    Glucose 100 (*)     ALT <7 (*)     A/G Ratio 2.1 (*)     All other components within normal limits   URINALYSIS WITH CULTURE REFLEX - Abnormal; Notable for the following components:    Clarity Urine Turbid (*)     Protein Urine 20 (*)     Leukocyte Esterase Urine 25 (*)     Bacteria Urine Rare (*)     Squamous Epi. Cells Few (*)     All other components within normal limits   CBC WITH DIFFERENTIAL WITH PLATELET   RAINBOW DRAW LAVENDER   RAINBOW DRAW LIGHT GREEN   RAINBOW DRAW GOLD   RAINBOW DRAW BLUE   URINE CULTURE, ROUTINE                       MDM          -Pulse oximetry was interpreted by me and was normal.  Pulse oximeter was ordered to monitor patient for hypoxia.             -Comorbidities did add complexity to the management are mentioned in the HPI above        -I personally reviewed the prior external notes and the medical record to obtain additional history I did review patient's outpatient visit April 2024, patient has been seen for lower extremity edema.        -DDX: Includes but not limited to -electrolyte disturbance,   nephrotic syndrome,   DVT             -I personally reviewed the US  findings and it shows -no DVT  Please refer to radiology report for official interpretation      US VENOUS DOPPLER ARM RIGHT - DIAG IMG (CPT=93971)   Final Result   PROCEDURE:  US VENOUS DOPPLER ARM RIGHT - DIAG IMG (CPT=93971)       COMPARISON:  None.       INDICATIONS:  eval for DVT, right upper extremity swelling and pain       TECHNIQUE:  Real time, grey scale, and duplex ultrasound was used to    evaluate the upper extremity venous system. B-mode two-dimensional images    of the vascular structures, Doppler spectral analysis, and color flow.     Doppler imaging were performed.  The    following veins were imaged:  Subclavian, Jugular, Axillary, Brachial,    Basilic, Cephalic, and the contralateral Subclavian and Jugular.       PATIENT STATED HISTORY: (As transcribed by  Technologist)              FINDINGS:     EXTREMITY:  Right upper extremity   THROMBI:  None visible.   COMPRESSION:  Normal compressibility, phasicity, and augmentation of the    subclavian, jugular, axillary, brachial, basilic, and cephalic veins.   OTHER:  Negative.                         =====   CONCLUSION:  Unremarkable right upper extremity venous ultrasound    examination.           LOCATION:  [facility]           Dictated by (CST): Chris Aguirre MD on 9/10/2024 at 12:30 PM        Finalized by (CST): Chris Aguirre MD on 9/10/2024 at 12:30 PM           Medications - No data to display    Labs Reviewed   COMP METABOLIC PANEL (14) - Abnormal; Notable for the following components:       Result Value    Glucose 100 (*)     ALT <7 (*)     A/G Ratio 2.1 (*)     All other components within normal limits   URINALYSIS WITH CULTURE REFLEX - Abnormal; Notable for the following components:    Clarity Urine Turbid (*)     Protein Urine 20 (*)     Leukocyte Esterase Urine 25 (*)     Bacteria Urine Rare (*)     Squamous Epi. Cells Few (*)     All other components within normal limits   CBC WITH DIFFERENTIAL WITH PLATELET   RAINBOW DRAW LAVENDER   RAINBOW DRAW LIGHT GREEN   RAINBOW DRAW GOLD   RAINBOW DRAW BLUE   URINE CULTURE, ROUTINE     Labs reviewed no UTI.  Subtle protein in the UA.  I will refer the patient to the PCP.  CMP is reassuring.  CBC is normal    No acute hematologic metabolic disturbance identified.    Patient was screened and evaluated during this visit.  I performed a medical screening examination.  As a treating physician attending to the patient, I determined, within reasonable clinical confidence and prior to discharge, that an emergency medical condition was not or was no longer present.  There was no indication for further evaluation, treatment or admission on an emergency basis.  Comprehensive verbal and written discharge and follow-up instructions were provided to help prevent relapse or worsening.   Patient was instructed to follow-up with her primary care provider for further evaluation and treatment, but to return immediately to the ER for worsening, concerning, new, changing or persisting symptoms.  I discussed the case with the patient and the patient demonstrated understanding.  They had no questions, complaints, or concerns.  Patient felt comfortable going home.                                         Medical Decision Making      Disposition and Plan     Clinical Impression:  1. Swelling of right upper extremity    2. Other fatigue         Disposition:  Discharge  9/10/2024 12:41 pm    Follow-up:  Anh Rosado DO  [address] [address]  [phone]    Follow up            Medications Prescribed:  Current Discharge Medication List

## 2024-09-11 ENCOUNTER — TELEPHONE (OUTPATIENT)
Dept: NEUROLOGY | Facility: CLINIC | Age: 59
End: 2024-09-11

## 2024-09-11 ENCOUNTER — PATIENT OUTREACH (OUTPATIENT)
Dept: CASE MANAGEMENT | Age: 59
End: 2024-09-11

## 2024-09-11 PROBLEM — G43.909 MIGRAINE: Status: ACTIVE | Noted: 2024-09-11

## 2024-09-11 PROBLEM — N39.3 FEMALE STRESS INCONTINENCE: Status: ACTIVE | Noted: 2021-03-04

## 2024-09-11 PROBLEM — G40.309 NONINTRACTABLE GENERALIZED IDIOPATHIC EPILEPSY WITHOUT STATUS EPILEPTICUS (HCC): Status: ACTIVE | Noted: 2022-01-17

## 2024-09-11 PROBLEM — R31.9 HEMATURIA: Status: ACTIVE | Noted: 2021-03-04

## 2024-09-11 NOTE — TELEPHONE ENCOUNTER
Received fax from [facility] requesting Neurology Clearance for upcoming procedure:    Procedure: EGD/Colonoscopy  Procedure date: 12/2/24    Patient last seen on 8/29/24 for cervical radiculopathy, seizures, migraines and dizziness.    Medications prescribed by neurology are:  levetiracetam, sumatripan, meclizine and Aimovig.    Form endorsed to provider for review and completion if appropriate.

## 2024-09-11 NOTE — PROGRESS NOTES
ÁNGEL spoke with pt briefly states this is not a good time to talk, she is at a doctor's appointment. ÁNGEL will try calling back later.

## 2024-09-12 ENCOUNTER — TELEPHONE (OUTPATIENT)
Dept: NEUROLOGY | Facility: CLINIC | Age: 59
End: 2024-09-12

## 2024-09-12 DIAGNOSIS — M54.12 CERVICAL RADICULOPATHY: Primary | ICD-10-CM

## 2024-09-12 RX ORDER — GABAPENTIN 300 MG/1
600 CAPSULE ORAL 3 TIMES DAILY
Qty: 180 CAPSULE | Refills: 4 | Status: SHIPPED | OUTPATIENT
Start: 2024-09-12

## 2024-09-12 NOTE — PROGRESS NOTES
NCM attempted to contact patient for DEENA, patient said \"hello\" then call was dropped. NCM will attempt calling patient later.

## 2024-09-12 NOTE — TELEPHONE ENCOUNTER
LMTCB to clarify dosage before sending to provider.     Historical in Epic as 300mg three times daily.     Has been given by Dr. Luque in the past. Same dosing, different directions.

## 2024-09-12 NOTE — TELEPHONE ENCOUNTER
Patient would like a refill on gabapentin 300 MG Oral Cap and send to         [facility] - [place], [place] - [address] [phone], [phone]   [address] [address]   Phone: [phone] Fax: [phone]   Hours: Not open 24 hours

## 2024-09-12 NOTE — TELEPHONE ENCOUNTER
Rx pended for review    Per patient when she called back office she indicated she was taking 600mg three times daily

## 2024-09-17 ENCOUNTER — MED REC SCAN ONLY (OUTPATIENT)
Dept: NEUROLOGY | Facility: CLINIC | Age: 59
End: 2024-09-17

## 2024-09-17 NOTE — TELEPHONE ENCOUNTER
Letter and form faxed back to [facility]    Confirmation received.     Sent for scanning on med rec encounter dated 9/17/24

## 2024-10-03 ENCOUNTER — HOSPITAL ENCOUNTER (OUTPATIENT)
Dept: MAMMOGRAPHY | Age: 59
Discharge: HOME | End: 2024-10-03
Attending: INTERNAL MEDICINE
Payer: MEDICARE

## 2024-10-03 ENCOUNTER — NURSE ONLY (OUTPATIENT)
Dept: INTERNAL MEDICINE CLINIC | Facility: CLINIC | Age: 59
End: 2024-10-03
Payer: MEDICARE

## 2024-10-03 DIAGNOSIS — Z12.31 BREAST CANCER SCREENING BY MAMMOGRAM: ICD-10-CM

## 2024-10-03 DIAGNOSIS — R92.30 DENSE BREAST: Primary | ICD-10-CM

## 2024-10-03 PROCEDURE — 77067 SCR MAMMO BI INCL CAD: CPT | Performed by: INTERNAL MEDICINE

## 2024-10-03 PROCEDURE — 77063 BREAST TOMOSYNTHESIS BI: CPT | Performed by: INTERNAL MEDICINE

## 2024-10-03 PROCEDURE — 96372 THER/PROPH/DIAG INJ SC/IM: CPT | Performed by: INTERNAL MEDICINE

## 2024-10-03 RX ADMIN — CYANOCOBALAMIN 1000 MCG: 1000 INJECTION, SOLUTION INTRAMUSCULAR; SUBCUTANEOUS at 10:46:00

## 2024-10-03 RX ADMIN — CYANOCOBALAMIN 1000 MCG: 1000 INJECTION, SOLUTION INTRAMUSCULAR; SUBCUTANEOUS at 10:32:00

## 2024-10-10 ENCOUNTER — TELEPHONE (OUTPATIENT)
Dept: INTERNAL MEDICINE CLINIC | Facility: CLINIC | Age: 59
End: 2024-10-10

## 2024-10-10 NOTE — TELEPHONE ENCOUNTER
Outgoing call in regards to scheduling an earlier appointment to address light headedness, left message to call back. Per Dr Rosado, patient is okay to schedule on SDA slot.

## 2024-11-06 ENCOUNTER — PATIENT MESSAGE (OUTPATIENT)
Dept: INTERNAL MEDICINE CLINIC | Facility: CLINIC | Age: 59
End: 2024-11-06

## 2024-12-01 ENCOUNTER — ANESTHESIA EVENT (OUTPATIENT)
Dept: ENDOSCOPY | Facility: HOSPITAL | Age: 59
End: 2024-12-01
Payer: MEDICARE

## 2024-12-02 ENCOUNTER — HOSPITAL ENCOUNTER (OUTPATIENT)
Facility: HOSPITAL | Age: 59
Setting detail: HOSPITAL OUTPATIENT SURGERY
Discharge: HOME | End: 2024-12-02
Attending: INTERNAL MEDICINE | Admitting: INTERNAL MEDICINE
Payer: MEDICARE

## 2024-12-02 ENCOUNTER — ANESTHESIA (OUTPATIENT)
Dept: ENDOSCOPY | Facility: HOSPITAL | Age: 59
End: 2024-12-02
Payer: MEDICARE

## 2024-12-02 VITALS
DIASTOLIC BLOOD PRESSURE: 67 MMHG | HEIGHT: 63.75 IN | BODY MASS INDEX: 33.01 KG/M2 | SYSTOLIC BLOOD PRESSURE: 138 MMHG | OXYGEN SATURATION: 92 % | RESPIRATION RATE: 16 BRPM | TEMPERATURE: 97 F | HEART RATE: 60 BPM | WEIGHT: 191 LBS

## 2024-12-02 DIAGNOSIS — R13.19 ESOPHAGEAL DYSPHAGIA: ICD-10-CM

## 2024-12-02 DIAGNOSIS — R12 HEARTBURN: ICD-10-CM

## 2024-12-02 DIAGNOSIS — Z12.11 COLON CANCER SCREENING: ICD-10-CM

## 2024-12-02 DIAGNOSIS — K59.00 CONSTIPATION, UNSPECIFIED CONSTIPATION TYPE: ICD-10-CM

## 2024-12-02 PROCEDURE — 0DBK8ZX EXCISION OF ASCENDING COLON, VIA NATURAL OR ARTIFICIAL OPENING ENDOSCOPIC, DIAGNOSTIC: ICD-10-PCS | Performed by: INTERNAL MEDICINE

## 2024-12-02 PROCEDURE — 0DB78ZX EXCISION OF STOMACH, PYLORUS, VIA NATURAL OR ARTIFICIAL OPENING ENDOSCOPIC, DIAGNOSTIC: ICD-10-PCS | Performed by: INTERNAL MEDICINE

## 2024-12-02 PROCEDURE — 88305 TISSUE EXAM BY PATHOLOGIST: CPT | Performed by: INTERNAL MEDICINE

## 2024-12-02 PROCEDURE — 0DB18ZX EXCISION OF UPPER ESOPHAGUS, VIA NATURAL OR ARTIFICIAL OPENING ENDOSCOPIC, DIAGNOSTIC: ICD-10-PCS | Performed by: INTERNAL MEDICINE

## 2024-12-02 PROCEDURE — 0DB38ZX EXCISION OF LOWER ESOPHAGUS, VIA NATURAL OR ARTIFICIAL OPENING ENDOSCOPIC, DIAGNOSTIC: ICD-10-PCS | Performed by: INTERNAL MEDICINE

## 2024-12-02 PROCEDURE — 0DB98ZX EXCISION OF DUODENUM, VIA NATURAL OR ARTIFICIAL OPENING ENDOSCOPIC, DIAGNOSTIC: ICD-10-PCS | Performed by: INTERNAL MEDICINE

## 2024-12-02 RX ORDER — SODIUM CHLORIDE, SODIUM LACTATE, POTASSIUM CHLORIDE, CALCIUM CHLORIDE 600; 310; 30; 20 MG/100ML; MG/100ML; MG/100ML; MG/100ML
INJECTION, SOLUTION INTRAVENOUS CONTINUOUS
Status: DISCONTINUED | OUTPATIENT
Start: 2024-12-02 | End: 2024-12-02

## 2024-12-02 RX ORDER — HYDROMORPHONE HYDROCHLORIDE 1 MG/ML
0.2 INJECTION, SOLUTION INTRAMUSCULAR; INTRAVENOUS; SUBCUTANEOUS EVERY 5 MIN PRN
Status: DISCONTINUED | OUTPATIENT
Start: 2024-12-02 | End: 2024-12-02

## 2024-12-02 RX ORDER — HYDROMORPHONE HYDROCHLORIDE 1 MG/ML
0.4 INJECTION, SOLUTION INTRAMUSCULAR; INTRAVENOUS; SUBCUTANEOUS EVERY 5 MIN PRN
Status: DISCONTINUED | OUTPATIENT
Start: 2024-12-02 | End: 2024-12-02

## 2024-12-02 RX ORDER — NALOXONE HYDROCHLORIDE 0.4 MG/ML
0.08 INJECTION, SOLUTION INTRAMUSCULAR; INTRAVENOUS; SUBCUTANEOUS AS NEEDED
Status: DISCONTINUED | OUTPATIENT
Start: 2024-12-02 | End: 2024-12-02

## 2024-12-02 RX ORDER — PROCHLORPERAZINE EDISYLATE 5 MG/ML
5 INJECTION INTRAMUSCULAR; INTRAVENOUS EVERY 8 HOURS PRN
Status: DISCONTINUED | OUTPATIENT
Start: 2024-12-02 | End: 2024-12-02

## 2024-12-02 RX ORDER — ONDANSETRON 2 MG/ML
4 INJECTION INTRAMUSCULAR; INTRAVENOUS EVERY 6 HOURS PRN
Status: DISCONTINUED | OUTPATIENT
Start: 2024-12-02 | End: 2024-12-02

## 2024-12-02 RX ORDER — LIDOCAINE HYDROCHLORIDE 10 MG/ML
INJECTION, SOLUTION EPIDURAL; INFILTRATION; INTRACAUDAL; PERINEURAL AS NEEDED
Status: DISCONTINUED | OUTPATIENT
Start: 2024-12-02 | End: 2024-12-02 | Stop reason: SURG

## 2024-12-02 RX ORDER — NALOXONE HYDROCHLORIDE 0.4 MG/ML
0.08 INJECTION, SOLUTION INTRAMUSCULAR; INTRAVENOUS; SUBCUTANEOUS ONCE AS NEEDED
Status: DISCONTINUED | OUTPATIENT
Start: 2024-12-02 | End: 2024-12-02

## 2024-12-02 RX ORDER — HYDROMORPHONE HYDROCHLORIDE 1 MG/ML
0.6 INJECTION, SOLUTION INTRAMUSCULAR; INTRAVENOUS; SUBCUTANEOUS EVERY 5 MIN PRN
Status: DISCONTINUED | OUTPATIENT
Start: 2024-12-02 | End: 2024-12-02

## 2024-12-02 RX ADMIN — SODIUM CHLORIDE, SODIUM LACTATE, POTASSIUM CHLORIDE, CALCIUM CHLORIDE: 600; 310; 30; 20 INJECTION, SOLUTION INTRAVENOUS at 07:14:00

## 2024-12-02 RX ADMIN — SODIUM CHLORIDE, SODIUM LACTATE, POTASSIUM CHLORIDE, CALCIUM CHLORIDE: 600; 310; 30; 20 INJECTION, SOLUTION INTRAVENOUS at 07:58:00

## 2024-12-02 RX ADMIN — LIDOCAINE HYDROCHLORIDE 25 MG: 10 INJECTION, SOLUTION EPIDURAL; INFILTRATION; INTRACAUDAL; PERINEURAL at 07:21:00

## 2024-12-02 NOTE — OPERATIVE REPORT
EGD Operative Report  Patient Name: Leila Alarcon  YOB: 1965  MRN: [id number]  Procedure: Esophagogastroduodenoscopy (EGD)  with cold forceps biopsies  Pre-operative Diagnosis & Indication: dysphagia, abdominal pain   Post-operative Diagnosis:   4 cm hiatal hernia  LA grade C esophagitis  Attending Endoscopist: Ramiro Celestin MD  Informed Consent: The planned procedure(s), the explanation of the procedure, its expected benefits, the potential complications and risks and possible alternatives and their benefits and risks were discussed with the patient or the patient's surrogate. The discussion of risks, not limited to but including bleeding, infection, perforation, adverse effects from anesthesia, need for emergency surgery/prolonged hospitalization,  cardiac arrhythmias,  and aspiration were discussed with patient.  Pt and/or surrogate understood the proposed procedure(s), its risks, benefits and alternatives and wish to proceed with procedure(s). All questions answered in full.  After all questions were answered to their satisfaction, a signed, informed, and witnessed consent was obtained.  Physical Exam: Heart: regular rate and rhythm. No rubs, murmurs, or gallops. Lungs: Clear to auscultation bilaterally. Abdomen: Soft, non-tender, non distended. No rebound tenderness, no guarding.   A TIME OUT WAS COMPLETED prior to the procedure to confirm the patient, procedure(s) and complete endoscopy safety procedure.  Sedation: Monitored Anesthesia Care; ASA class per anesthesiology team   Monitoring: Pulsoximetry, pulse, respirations, and blood pressure , vitals were monitored throughout the entire procedure under monitored anesthesia care.   Procedure: The patient was then brought to the endoscopy suite where his/her pulse, pulse oximetry and blood pressure were monitored. The patient was placed in the left lateral decubitus position and deep sedation was administered. Once adequate sedation was  achieved, a bite block was placed and a lubricated tip of an Olympus video upper endoscope was inserted through the oropharynx and gently manipulated through the esophagus into the stomach and the second portion of the duodenum. Upon withdrawal of the endoscope, careful visualization of the mucosa was performed. The endoscope was then withdrawn into the gastric antrum and placed in a retroflexed position.  The endoscope was then righted, and air was suctioned from the stomach.  The endoscope was then withdrawn from the patient, with careful visual inspection of the mucosa. The patient left the procedure room in stable condition for recovery. Findings and endotherapy as listed below  Toleration: Patient tolerated procedure well   Complications: No immediate complications   Technical Difficulty:  The procedure was not technically difficult   Estimated Blood Loss: Minimal, less than 5mL of estimated blood loss.   Findings and Therapeutics:  Esophagus:   LA grade C esophagitis was present.  There were no strictures or stenosis. The diaphragmatic impression was present at 35 cm from the incisors. GEJ junction traversed with endoscope without resistance.  The Z-line was irregular, appreciated at 31 cm from the incisors. A 4 cm hiatal hernia was present.   Biopsies were obtained with cold forceps in the proximal and distal esophagus.   Stomach:    The gastric body, antrum, fundus, cardia, and angularis were normal. No ulcers, erosions or masses visualized. Endoscope was placed in a retroflexion view in the stomach. There was  evidence of a hiatal hernia. Biopsies with cold forceps were obtained in the antrum and body.  Duodenum:   The entire examined duodenum was normal.  Biopsies with cold forceps were obtained.   Recommendations:  Post EGD precautions, watch for bleeding, infection, perforation, adverse drug reactions   Follow-up biopsies  Increase pantoprazole to 40mg bid x2 months, then decrease to daily  Avoid  non-aspirin NSAID  Repeat EGD in 3 months  Consider surgery referral for evaluation of hiatal hernia repair    Ramiro Celestin MD  12/2/2024  7:54 AM

## 2024-12-02 NOTE — OPERATIVE REPORT
Colonoscopy Operative Report  Patient Name: Leila Alarcon  YOB: 1965  MRN: [id number]  Procedure: Colonoscopy with cold biopsy polypectomy  Pre-operative Diagnosis & Indication: abdominal pain  Post-operative Diagnosis:   2mm ascending colon polyp resected with cold biopsy forceps  Left sided diverticulosis  Attending Endoscopist: Ramiro Celestin MD  Informed Consent: The planned procedure(s), the explanation of the procedure, its expected benefits, the potential complications and risks and possible alternatives and their benefits and risks were discussed with the patient or the patient's surrogate. The discussion of risks, not limited to but including bleeding, infection, perforation, adverse effects from anesthesia, need for emergency surgery, medication effects, cardiac arrhythmias, missed polyps, and aspiration and death, were discussed with patient.  Pt and/or surrogate understood the proposed procedure(s), its risks, benefits and alternatives and wish to proceed with procedure(s). All questions answered in full.  After all questions were answered to their satisfaction, a signed, informed, and witnessed consent was obtained.  Physical Exam: Heart: regular rate and rhythm. No rubs, murmurs, or gallops. Lungs: Clear to auscultation bilaterally. Abdomen: Soft, non-tender, non distended. Positive bowel sounds. No rebound tenderness, no guarding.   A TIME OUT WAS COMPLETED prior to the procedure to confirm the patient, procedure and complete endoscopy safety procedure.   Sedation: Monitored Anesthesia Care; ASA class per anesthesiology team   Monitoring: Pulsoximetry, pulse, respirations, and blood pressure, vitals were monitored throughout the entire procedure under monitored anesthesia care.   Preparation Quality:  [place] Bowel Prep Score: 6  [Right 2/ Transverse 2/ Left 2 ]   Procedure: After achieving adequate sedation, and placing the patient in the left lateral decubitus position, a  digital rectal examination was performed.  The lubricated tip of the pediatric colonoscope was then introduced into the rectum and advanced to the terminal ileum.  The appendiceal orifice and ileocecal valve were clearly and distinctly visualized, thus verifying the cecum. The terminal ileum was intubated.  The endoscope was then carefully withdrawn from the patient with careful visualization of the colonic mucosa to the best of my ability, with bowel preparation quality as noted above.  Air was suctioned to the best of my ability, during withdrawal of the endoscope.  When the endoscope reached the rectum, it was placed in a retroflexed position, and the rectal bulb was thus visualized.  The endoscope was righted, and air was suctioned from the colon to the best of my ability, as it was during withdrawn from the colon.  The endoscope was then removed from the patient.  The patient tolerated the procedure without apparent procedural complications.  The patient left the procedure room in stable condition for recovery.  Toleration: Patient tolerated procedure well   Complications: No immediate complications   Technical Difficulty:  The procedure was not technically difficult   Estimated Blood Loss: Minimal, less than 5mL of estimated blood loss.     Findings and Therapeutics:  Terminal Ileum:  The entire examined ileum was normal.   Colon: There was a 2mm ascending colon polyp that was resected with cold forceps and retrieved. The entire visualized colon was otherwise normal.  No signs of inflammation, ulceration or erosions. There were diverticula noted throughout the entire visualized left colon.   Rectum: There were small sized, internal hemorrhoids seen on retroflexion.   Recommendations:    Post Colonoscopy/polypectomy precautions, watch for bleeding, infection, perforation, adverse drug reactions.   High fiber diet  Recall colonoscopy in 5 years    Ramiro Celestin MD  12/2/2024  7:56 AM

## 2024-12-02 NOTE — ANESTHESIA POSTPROCEDURE EVALUATION
[facility]    Leila Alarcon Patient Status:  Hospital Outpatient Surgery   Age/Gender 59 year old female MRN [id number]   Location [facility] ENDOSCOPY [facility] Attending Ramiro Celestin MD   Hosp Day # 0 PCP Anh Rosado DO       Anesthesia Post-op Note    ESOPHAGOGASTRODUODENOSCOPY (EGD) with biopsies, COLONOSCOPY with forcep polypectomy    Procedure Summary       Date: 12/02/24 Room / Location:  ENDOSCOPY 02 / [facility]    Anesthesia Start: 0715 Anesthesia Stop: 0758    Procedures:       ESOPHAGOGASTRODUODENOSCOPY (EGD) with biopsies, COLONOSCOPY with forcep polypectomy      COLONOSCOPY Diagnosis:       Colon cancer screening      Heartburn      Esophageal dysphagia      Constipation, unspecified constipation type      (EGD- LA grade C Esophagitis, Hiatal Hernia   COLON-Polyp, Diverticulosis)    Surgeons: Ramiro Celestin MD Anesthesiologist: Ray Carreno MD    Anesthesia Type: MAC ASA Status: 3            Anesthesia Type: MAC    Vitals Value Taken Time   /79 12/02/24 0758   Temp 97.3 °F (36.3 °C) 12/02/24 0758   Pulse 78 12/02/24 0758   Resp 16 12/02/24 0758   SpO2 100 % 12/02/24 0758       Patient Location: Endoscopy    Anesthesia Type: MAC    Airway Patency: patent    Postop Pain Control: adequate    Mental Status: mildly sedated but able to meaningfully participate in the post-anesthesia evaluation    Nausea/Vomiting: none    Cardiopulmonary/Hydration status: stable euvolemic    Complications: no apparent anesthesia related complications    Postop vital signs: stable    Dental Exam: Unchanged from Preop    Patient to be discharged home when criteria met.

## 2024-12-02 NOTE — H&P
History & Physical Examination    Patient Name: Leila Alarcon  MRN: [id number]  CSN: [id number]  YOB: 1965    Diagnosis: dysphagia, abdominal pain    Present Illness: 60 y/o F presents for EGD/colonoscopy     Prescriptions Prior to Admission[1]  Current Facility-Administered Medications   Medication Dose Route Frequency    lactated ringers infusion   Intravenous Continuous       Allergies: Allergies[2]    Past Medical History:    Abdominal pain    Anxiety state    Back pain    Back problem    DEGENERATIVE DISC    Bloating    Body piercing    Cancer (HCC)    biopsy of breast and polyps    Constipation    Crohn disease (HCC)    Decorative tattoo    Depression    Diverticulosis of large intestine    Dizziness    Heartburn    History of depression    Indigestion    Irregular bowel habits    Itch of skin    Migraines    Nausea and vomiting    Night sweats    Pain with bowel movements    Seizure disorder (HCC)    Stool incontinence    Stress    Ulcerative colitis (HCC)    Vomiting    Wears glasses    Weight gain     Past Surgical History:   Procedure Laterality Date    Bunionectomy Right 2023    Colectomy      Colon surgery      partial resection    Hernia repair      Hernia surgery      Coral localization wire 1 site left (cpt=19281) Left     benign , several yrs ago    Removal gallbladder      Repair of hammertoe,one Right 2023    Wrist fracture surgery      plate and screws  left     Family History   Problem Relation Age of Onset    Mental Disorder Mother     Diabetes Mother      Social History     Tobacco Use    Smoking status: Every Day     Current packs/day: 0.50     Average packs/day: 0.5 packs/day for 43.9 years (22.0 ttl pk-yrs)     Types: Cigarettes     Start date: 1/1/1981    Smokeless tobacco: Never   Substance Use Topics    Alcohol use: Not Currently       SYSTEM Check if Review is Normal Check if Physical Exam is Normal If not normal, please explain:   HEENT [ x] [x ]    NECK & BACK [ x]  [ x]    HEART [ x] [x ]    LUNGS [ x] [ x]    ABDOMEN [ x] [x ]    UROGENITAL [ n/a] [ n/a]    EXTREMITIES [ x] [x ]    OTHER        [ x ] I have discussed the risks and benefits and alternatives with the patient/family.  They understand and agree to proceed with plan of care.  [ x ] I have reviewed the History and Physical done within the last 30 days.  Any changes noted above.    Ramiro Celestin MD  12/2/2024  7:13 AM            [1]   Facility-Administered Medications Prior to Admission   Medication Dose Route Frequency Provider Last Rate Last Admin    [COMPLETED] cyanocobalamin (Vitamin B12) 1000 MCG/ML injection 1,000 mcg  1,000 mcg Intramuscular Once    1,000 mcg at 09/05/24 1035    cyanocobalamin (Vitamin B12) 1000 MCG/ML injection 1,000 mcg  1,000 mcg Intramuscular Q30 Days Anh Rosado, DO   1,000 mcg at 10/03/24 1046     Medications Prior to Admission   Medication Sig Dispense Refill Last Dose/Taking    PEG 3350-KCl-Na Bicarb-NaCl 420 g Oral Recon Soln Take as directed by physician. 4000 mL 0 12/1/2024    gabapentin 300 MG Oral Cap Take 2 capsules (600 mg total) by mouth in the morning, at noon, and at bedtime. 180 capsule 4 Past Week    SUMAtriptan 50 MG Oral Tab TAKE 1 TABLET BY MOUTH AT ONSET OF HEADACHE. MAY REPEAT ONCE AFTER 2 HOURS- TAKE MAXIMUM OF 2 TABLETS PER DAY 9 tablet 3 Past Week    meclizine 25 MG Oral Tab Take 1 tablet (25 mg total) by mouth 3 (three) times daily as needed for Dizziness. 30 tablet 3 Past Week    levETIRAcetam 500 MG Oral Tab Take 1 tablet (500 mg total) by mouth 2 (two) times daily. 180 tablet 3 12/2/2024 Morning    budesonide 3 MG Oral Cap DR Particles Take 1 capsule (3 mg total) by mouth 3 (three) times daily before meals as needed. 180 capsule 1 Past Month    erenumab-aooe (AIMOVIG) 70 MG/ML Subcutaneous Inject 1 mL (70 mg total) into the skin every 30 (thirty) days. 1 mL 11 More than a month    amLODIPine 5 MG Oral Tab Take 1 tablet (5 mg total) by mouth at bedtime.  (Patient taking differently: Take 1 tablet (5 mg total) by mouth as needed.) 90 tablet 0 More than a month    ALPRAZolam (XANAX) 0.5 MG Oral Tab Take 1 tablet (0.5 mg total) by mouth nightly as needed. 30 tablet 0 More than a month    triamcinolone 0.1 % External Cream Use twice a day to affected area for 2 weeks then take a break for 2 weeks then apply again for 2 weeks 45 g 3 Unknown    escitalopram (LEXAPRO) 20 MG Oral Tab Take one tablet daily (Patient taking differently: as needed. Take one tablet daily) 90 tablet 1 More than a month    pantoprazole 40 MG Oral Tab EC Take 1 tablet (40 mg total) by mouth before breakfast. 90 tablet 1 More than a month   [2]   Allergies  Allergen Reactions    Dust Mite Extract OTHER (SEE COMMENTS)    Seasonal OTHER (SEE COMMENTS)     Runny nose

## 2024-12-02 NOTE — DISCHARGE INSTRUCTIONS
Home Care Instructions for Colonoscopy and EGD With Sedation    Diet:  - Resume your regular diet as tolerated unless otherwise instructed.  - start with light meals to minimize bloating.  - Do not drink alcohol today.    Medication:  - If you have questions about resuming your normal medications, please contact your Primary Care Physician.    Activities:  - Take it easy today. Do not return to work today.  - Do not drive today.  - Do not operate any machinery today (including kitchen equipment).    Colonoscopy:  - You may notice some rectal \"spotting\" (a little blood on the toilet tissue) for a day or two after the exam. This is normal.  - If you experience any rectal bleeding (not spotting), persistent tenderness or sharp severe abdominal pains, oral temperature over 100 degrees Farenheit, light-headedness or dizziness, or any other problems, contact your doctor.    EGD:  - You may have a sore throat for 2-3 days following the exam. This is normal. Gargling with warm salt water (1/2 tsp salt to 1 glass warm water) or using throat lozenges will help.  - If you experience any sharp pain in your neck, abdomen or chest, vomiting of blood, oral temperature over 100 degrees Farenheit, light-headedness or dizziness, or any other problems, contact your doctor.    **If unable to reach your doctor, please go to the [facility] Emergency Room**    - Your referring physician will receive a full report of your examination.  - If you do not hear from your doctor's office within two weeks of your biopsy, please call them for your results.    Additional Comments/Instructions (if applicable):

## 2024-12-02 NOTE — ANESTHESIA PREPROCEDURE EVALUATION
PRE-OP EVALUATION    Patient Name: Leila Alarcon    Admit Diagnosis: Colon cancer screening [Z12.11]  Heartburn [R12]  Esophageal dysphagia [R13.19]  Constipation, unspecified constipation type [K59.00]    Pre-op Diagnosis: Colon cancer screening [Z12.11]  Heartburn [R12]  Esophageal dysphagia [R13.19]  Constipation, unspecified constipation type [K59.00]    ESOPHAGOGASTRODUODENOSCOPY (EGD), COLONOSCOPY    Anesthesia Procedure: ESOPHAGOGASTRODUODENOSCOPY (EGD), COLONOSCOPY  COLONOSCOPY    Surgeons and Role:     * Ramiro Celestin MD - Primary    Pre-op vitals reviewed.        Body mass index is 31.83 kg/m².    Current medications reviewed.  Hospital Medications:   cyanocobalamin (Vitamin B12) 1000 MCG/ML injection 1,000 mcg  1,000 mcg Intramuscular Q30 Days       Outpatient Medications:   Prescriptions Prior to Admission[1]    Allergies: Dust mite extract and Seasonal      Anesthesia Evaluation    Patient summary reviewed.    Anesthetic Complications           GI/Hepatic/Renal      (+) GERD                  (+) Crohn's disease  (+) ulcerative colitis       Cardiovascular                (+) obesity  (+) hypertension                                     Endo/Other                                  Pulmonary                           Neuro/Psych      (+) depression  (+) anxiety       (+) seizures    (+) headaches                   Past Surgical History:   Procedure Laterality Date    Bunionectomy Right 2023    Colectomy      Colon surgery      partial resection    Hernia repair      Hernia surgery      Coral localization wire 1 site left (cpt=19281) Left     benign , several yrs ago    Removal gallbladder      Repair of hammertoe,one Right 2023    Wrist fracture surgery      plate and screws  left     Social History     Socioeconomic History    Marital status: Single   Tobacco Use    Smoking status: Every Day     Current packs/day: 0.50     Average packs/day: 0.5 packs/day for 43.9 years (22.0 ttl pk-yrs)      Types: Cigarettes     Start date: 1/1/1981    Smokeless tobacco: Never   Vaping Use    Vaping status: Never Used   Substance and Sexual Activity    Alcohol use: Not Currently    Drug use: Never   Other Topics Concern    Caffeine Concern Yes     Comment: occ    Exercise Yes     Comment: walking     History   Drug Use Unknown     Available pre-op labs reviewed.  Lab Results   Component Value Date    WBC 7.8 09/10/2024    RBC 4.98 09/10/2024    HGB 15.1 09/10/2024    HCT 43.7 09/10/2024    MCV 87.8 09/10/2024    MCH 30.3 09/10/2024    MCHC 34.6 09/10/2024    RDW 13.1 09/10/2024    .0 09/10/2024     Lab Results   Component Value Date     09/10/2024    K 4.3 09/10/2024     09/10/2024    CO2 25.0 09/10/2024    BUN 10 09/10/2024    CREATSERUM 0.88 09/10/2024     (H) 09/10/2024    CA 9.5 09/10/2024            Airway      Mallampati: II  Mouth opening: >3 FB  TM distance: > 6 cm  Neck ROM: full Cardiovascular    Cardiovascular exam normal.  Rhythm: regular  Rate: normal     Dental             Pulmonary    Pulmonary exam normal.                 Other findings              ASA: 3   Plan: MAC  NPO status verified and patient meets guidelines.        Comment: We discussed TIVA, administering medications through the IV to relax and help with pain control. We also discussed the risk of awareness during the procedure, and the possibility of requiring general anesthesia and placement of a breathing tube if needed. Risks of allergic reaction to medications, aspiration, cardiopulmonary complications also described. All questions were answered.       Plan/risks discussed with: patient                Present on Admission:  **None**             [1]   No medications prior to admission.

## 2025-01-07 PROBLEM — R31.9 HEMATURIA: Status: RESOLVED | Noted: 2021-03-04 | Resolved: 2025-01-07

## 2025-01-07 PROBLEM — G43.909 MIGRAINE: Status: RESOLVED | Noted: 2024-09-11 | Resolved: 2025-01-07

## 2025-01-07 PROBLEM — F33.1 MODERATE EPISODE OF RECURRENT MAJOR DEPRESSIVE DISORDER (HCC): Status: ACTIVE | Noted: 2025-01-07

## 2025-01-07 PROBLEM — R56.9 SEIZURE (HCC): Status: RESOLVED | Noted: 2021-11-16 | Resolved: 2025-01-07

## 2025-01-07 PROBLEM — K50.10 CROHN'S DISEASE OF COLON WITHOUT COMPLICATION (HCC): Status: RESOLVED | Noted: 2021-10-26 | Resolved: 2025-01-07

## 2025-01-07 PROBLEM — K58.1 IRRITABLE BOWEL SYNDROME WITH CONSTIPATION: Status: ACTIVE | Noted: 2025-01-07

## 2025-01-20 ENCOUNTER — TELEPHONE (OUTPATIENT)
Dept: INTERNAL MEDICINE CLINIC | Facility: CLINIC | Age: 60
End: 2025-01-20

## 2025-01-20 DIAGNOSIS — M54.12 CERVICAL RADICULOPATHY: Primary | ICD-10-CM

## 2025-01-20 NOTE — TELEPHONE ENCOUNTER
States has been using gabapentin for years and has not been working. Is wondering if something else can be prescribed.    Would like script going to [facility] if appropriate.

## 2025-01-21 RX ORDER — PREGABALIN 25 MG/1
25 CAPSULE ORAL 2 TIMES DAILY
Qty: 60 CAPSULE | Refills: 0 | Status: SHIPPED | OUTPATIENT
Start: 2025-01-21 | End: 2025-01-21

## 2025-01-21 RX ORDER — GABAPENTIN 300 MG/1
600 CAPSULE ORAL 3 TIMES DAILY
COMMUNITY
Start: 2025-01-21

## 2025-01-21 NOTE — TELEPHONE ENCOUNTER
Lets have her stop the gabapentin and start Lyrica.  She will need to take it twice a day and it may cause drowsiness.  Lets do it for 1 month to see if she finds any improvement.  Anh Rosado DO

## 2025-01-21 NOTE — TELEPHONE ENCOUNTER
Spoke with patient. She clarified she doesn't want to stop Gabapentin, but does want to see if there is any medication that can help with the pain of her right arm.     SV: Pt doesn't want to stop taking Gabapentin, but does want to see if there is any medication that can help with the pain of her right arm temporarily. Please advise, TY!

## 2025-01-21 NOTE — TELEPHONE ENCOUNTER
Can we put the gabapentin back and call pharmacy to not fill lyrica. She can take tylenol or apply voltaren gel  Anh Rosado DO

## 2025-01-21 NOTE — TELEPHONE ENCOUNTER
Spoke with pt's preferred pharmacy to not fill Lyrica.     Spoke with patient to relay Dr. Rosado's recommendation. Pt states she has already tried that, but will continue to do so.

## 2025-01-21 NOTE — TELEPHONE ENCOUNTER
Pt is just calling to follow up, I let her know SV was out of the office yesterday and will try to respond as soon as she can

## 2025-01-22 ENCOUNTER — MED REC SCAN ONLY (OUTPATIENT)
Dept: INTERNAL MEDICINE CLINIC | Facility: CLINIC | Age: 60
End: 2025-01-22

## 2025-01-22 ENCOUNTER — TELEPHONE (OUTPATIENT)
Dept: INTERNAL MEDICINE CLINIC | Facility: CLINIC | Age: 60
End: 2025-01-22

## 2025-01-22 NOTE — TELEPHONE ENCOUNTER
Pt called demanding that her appt for 2/7 be cancelled and saying that she wanted the appt w/ Dr. Rosado for her pain but we 'messed up' her appts. I asked if she meant the appt on 2/10 for b12 which she stated ' YES, you people are so stupid up there.'     Pt then states 'I'm not sorry, but I'm frustrated and I can't stand the people who answer the phones, they're so stupid!' I told the pt that I was the one who scheduled her and we have patient service reps from  as well as medical assistants assigned to answer phones.    Pt then stated 'Well I don't care! You guys are always messing things up! Cancel my appt!', after that she abruptly hung up.

## 2025-01-22 NOTE — TELEPHONE ENCOUNTER
PT did call back to apologize to the PSR who took her call, I did ask her if there was something I could help her with, to which she started to get upset again about the same topic and mentioned wanting to same appointment time back. I let her know I could get the nurse visit back for the B-12, but she wanted the OV back with Sv which was not available. I offered her the next slot which she was upset that she did not want to do a NV and OV on different says due to travel. I explained if she wants an OV we can cancel the NV and just do the b-12 at the ov visit. She agreed to that, but again was still upset. I did try to explain that for the following b-12 injections they do not need to be OV only NV's and she was not happy about that. I let her know she can discuss it further with SV at the upcoming appt.     Future Appointments   Date Time Provider Department Center   2/12/2025 12:00 PM Anh Rosado,  [facility] 8 [facility] [place]   3/10/2025 11:15 AM [facility] 08 NURSE [facility] [place]   3/10/2025  1:00 PM Ramiro Celestin MD [facility]   3/24/2025 12:40 PM [facility]   4/10/2025 11:30 AM [facility] 08 NURSE [facility] [place]   5/12/2025 11:30 AM [facility] 08 NURSE [facility] 8 [facility] [place]

## 2025-02-11 ENCOUNTER — OFFICE VISIT (OUTPATIENT)
Dept: INTERNAL MEDICINE CLINIC | Facility: CLINIC | Age: 60
End: 2025-02-11
Payer: MEDICARE

## 2025-02-11 VITALS
SYSTOLIC BLOOD PRESSURE: 128 MMHG | OXYGEN SATURATION: 99 % | HEIGHT: 63.75 IN | TEMPERATURE: 98 F | WEIGHT: 194.63 LBS | RESPIRATION RATE: 16 BRPM | HEART RATE: 85 BPM | DIASTOLIC BLOOD PRESSURE: 76 MMHG | BODY MASS INDEX: 33.64 KG/M2

## 2025-02-11 DIAGNOSIS — E53.8 VITAMIN B12 DEFICIENCY: ICD-10-CM

## 2025-02-11 DIAGNOSIS — F41.9 ANXIETY: ICD-10-CM

## 2025-02-11 DIAGNOSIS — E66.811 CLASS 1 OBESITY WITHOUT SERIOUS COMORBIDITY WITH BODY MASS INDEX (BMI) OF 30.0 TO 30.9 IN ADULT, UNSPECIFIED OBESITY TYPE: ICD-10-CM

## 2025-02-11 DIAGNOSIS — J45.21 MILD INTERMITTENT ASTHMA WITH EXACERBATION (HCC): Primary | ICD-10-CM

## 2025-02-11 DIAGNOSIS — F33.1 MODERATE EPISODE OF RECURRENT MAJOR DEPRESSIVE DISORDER (HCC): ICD-10-CM

## 2025-02-11 PROCEDURE — 96372 THER/PROPH/DIAG INJ SC/IM: CPT | Performed by: INTERNAL MEDICINE

## 2025-02-11 PROCEDURE — 3074F SYST BP LT 130 MM HG: CPT | Performed by: INTERNAL MEDICINE

## 2025-02-11 PROCEDURE — 99214 OFFICE O/P EST MOD 30 MIN: CPT | Performed by: INTERNAL MEDICINE

## 2025-02-11 PROCEDURE — 3008F BODY MASS INDEX DOCD: CPT | Performed by: INTERNAL MEDICINE

## 2025-02-11 PROCEDURE — 3078F DIAST BP <80 MM HG: CPT | Performed by: INTERNAL MEDICINE

## 2025-02-11 RX ORDER — AZITHROMYCIN 250 MG/1
TABLET, FILM COATED ORAL
Qty: 6 TABLET | Refills: 0 | Status: SHIPPED | OUTPATIENT
Start: 2025-02-11 | End: 2025-02-16

## 2025-02-11 RX ORDER — ALPRAZOLAM 0.5 MG
0.5 TABLET ORAL NIGHTLY PRN
Qty: 30 TABLET | Refills: 0 | Status: SHIPPED | OUTPATIENT
Start: 2025-02-11

## 2025-02-11 RX ORDER — PREDNISONE 20 MG/1
20 TABLET ORAL DAILY
Qty: 5 TABLET | Refills: 0 | Status: SHIPPED | OUTPATIENT
Start: 2025-02-11 | End: 2025-02-16

## 2025-02-11 RX ORDER — ESCITALOPRAM OXALATE 10 MG/1
10 TABLET ORAL DAILY
Qty: 90 TABLET | Refills: 0 | Status: SHIPPED | OUTPATIENT
Start: 2025-02-11

## 2025-02-11 RX ORDER — ALBUTEROL SULFATE 90 UG/1
1 INHALANT RESPIRATORY (INHALATION) EVERY 6 HOURS PRN
Qty: 18 G | Refills: 0 | Status: SHIPPED | OUTPATIENT
Start: 2025-02-11

## 2025-02-11 RX ORDER — TIRZEPATIDE 2.5 MG/.5ML
2.5 INJECTION, SOLUTION SUBCUTANEOUS WEEKLY
Qty: 2 ML | Refills: 0 | Status: SHIPPED | OUTPATIENT
Start: 2025-02-11

## 2025-02-11 RX ADMIN — CYANOCOBALAMIN 1000 MCG: 1000 INJECTION, SOLUTION INTRAMUSCULAR; SUBCUTANEOUS at 13:40:00

## 2025-02-11 NOTE — PATIENT INSTRUCTIONS
Do not take the Zoloft and we will just continue with the Lexapro    I have sent Zepbound for you to see if it is covered through insurance.  If it is covered please schedule an appointment with the nurse to learn how to use it or you can bring it during her next visit    I have sent an antibiotic, steroid and an inhaler to see if that helps with your cough.  Let me know if your symptoms do not get better.

## 2025-02-11 NOTE — PROGRESS NOTES
Patient Office Visit    ASSESSMENT AND PLAN:   1. Mild intermittent asthma with exacerbation (HCC)  Note: No wheezing heard today.  Since patient is smoking will start treatment below.  Patient will follow-up in 5 days and let us know if the symptoms are improving  - predniSONE 20 MG Oral Tab; Take 1 tablet (20 mg total) by mouth daily for 5 days.  Dispense: 5 tablet; Refill: 0  - azithromycin 250 MG Oral Tab; Take 2 tablets (500 mg total) by mouth daily for 1 day, THEN 1 tablet (250 mg total) daily for 4 days.  Dispense: 6 tablet; Refill: 0  - albuterol 108 (90 Base) MCG/ACT Inhalation Aero Soln; Inhale 1 puff into the lungs every 6 (six) hours as needed.  Dispense: 18 g; Refill: 0    2. Vitamin B12 deficiency  Note: B12 shot provided today    3. Moderate episode of recurrent major depressive disorder (HCC)  Note: Will stop the Zoloft and continue with the Lexapro and Zoloft may increase risk for seizures and as patient has tolerated Lexapro in the past we will continue  - escitalopram 10 MG Oral Tab; Take 1 tablet (10 mg total) by mouth daily.  Dispense: 90 tablet; Refill: 0    4. Anxiety  Note: Refill provided and discussed to use it sparingly  - ALPRAZolam (XANAX) 0.5 MG Oral Tab; Take 1 tablet (0.5 mg total) by mouth nightly as needed.  Dispense: 30 tablet; Refill: 0  - escitalopram 10 MG Oral Tab; Take 1 tablet (10 mg total) by mouth daily.  Dispense: 90 tablet; Refill: 0    5. Class 1 obesity without serious comorbidity with body mass index (BMI) of 30.0 to 30.9 in adult, unspecified obesity type  Note: Recommended to work on diet and lifestyle modifications. Once it is approved patient will schedule an appointment with the nurse to learn how to use it.  She denies any personal or family history of medullary thyroid cancer or pancreatitis and she understands the side effects of nausea, vomiting, constipation and diarrhea  - Tirzepatide-Weight Management (ZEPBOUND) 2.5 MG/0.5ML Subcutaneous Solution  Auto-injector; Inject 2.5 mg into the skin once a week.  Dispense: 2 mL; Refill: 0    Return to clinic in 1 month      Patient/Caregiver Education: Patient/Caregiver Education: There are no barriers to learning. Medical education done. Outcome: Patient verbalizes understanding. Patient is notified to call with any questions, complications, allergies, or worsening or changing symptoms.  Patient is to call with any side effects or complications from the treatments as a result of today.      Reviewed Past Medical History and   Patient Active Problem List   Diagnosis    Cognitive deficits    Anxiety    Raynaud's phenomenon without gangrene    Excessive sleepiness    Intractable chronic migraine without aura and with status migrainosus    Class 1 obesity without serious comorbidity with body mass index (BMI) of 30.0 to 30.9 in adult    Gastroesophageal reflux disease    Primary hypertension    Cervical radiculopathy    Vitamin B12 deficiency    Prediabetes    Female stress incontinence    Nonintractable generalized idiopathic epilepsy without status epilepticus (HCC)    Irritable bowel syndrome with constipation    Moderate episode of recurrent major depressive disorder (HCC)       No orders of the defined types were placed in this encounter.    Requested Prescriptions     Signed Prescriptions Disp Refills    predniSONE 20 MG Oral Tab 5 tablet 0     Sig: Take 1 tablet (20 mg total) by mouth daily for 5 days.    azithromycin 250 MG Oral Tab 6 tablet 0     Sig: Take 2 tablets (500 mg total) by mouth daily for 1 day, THEN 1 tablet (250 mg total) daily for 4 days.    albuterol 108 (90 Base) MCG/ACT Inhalation Aero Soln 18 g 0     Sig: Inhale 1 puff into the lungs every 6 (six) hours as needed.    ALPRAZolam (XANAX) 0.5 MG Oral Tab 30 tablet 0     Sig: Take 1 tablet (0.5 mg total) by mouth nightly as needed.    escitalopram 10 MG Oral Tab 90 tablet 0     Sig: Take 1 tablet (10 mg total) by mouth daily.    Tirzepatide-Weight  Management (ZEPBOUND) 2.5 MG/0.5ML Subcutaneous Solution Auto-injector 2 mL 0     Sig: Inject 2.5 mg into the skin once a week.         Anh Rosado DO  CC:  Follow up        HPI:   Liela Alarcon is a 59-year-old female who presents for the following concerns    Wheezing and cough: Has been going on for the past couple weeks and it has slightly improved but not completely.  She continues to smoke and it is difficult for her to stop smoking.  She has not been on prednisone for a while but she is interested.  Depression: She never started the Zoloft as she was not sure what it is.  She would like to just remain on the Lexapro  Obesity: She wants to try to see if Zepbound is covered.  She denies any personal or family history of medullary thyroid cancer or pancreatitis.  She is aware of the side effects of nausea, vomiting, constipation and diarrhea    Past Medical History:    Abdominal pain    Allergic rhinitis    Anxiety    Anxiety state    Back pain    Back problem    DEGENERATIVE DISC    Bloating    Body piercing    Cancer (HCC)    biopsy of breast and polyps    Constipation    Crohn disease (HCC)    Decorative tattoo    Depression    Diverticulosis of large intestine    Dizziness    Esophageal reflux    Heartburn    History of depression    Hypothyroidism    Don't know    Indigestion    Irregular bowel habits    Itch of skin    Migraines    Nausea and vomiting    Night sweats    Obesity    Pain with bowel movements    Seizure disorder (HCC)    Stool incontinence    Stress    Ulcerative colitis (HCC)    Vomiting    Wears glasses    Weight gain       Past Surgical History:   Procedure Laterality Date    Bunionectomy Right 2023    Cholecystectomy      Colectomy      Colon surgery      partial resection    Colonoscopy N/A 12/02/2024    Procedure: COLONOSCOPY;  Surgeon: Ramiro Celestin MD;  Location:  ENDOSCOPY    Hernia repair      Hernia surgery      Coral localization wire 1 site left (cpt=19281)  Left     benign , several yrs ago          Other surgical history      Removal gallbladder      Repair of hammertoe,one Right     Wrist fracture surgery      plate and screws  left       Social History:  Social History     Socioeconomic History    Marital status: Single   Tobacco Use    Smoking status: Every Day     Current packs/day: 0.50     Average packs/day: 0.5 packs/day for 44.1 years (22.1 ttl pk-yrs)     Types: Cigarettes     Start date: 1981    Smokeless tobacco: Never   Vaping Use    Vaping status: Never Used   Substance and Sexual Activity    Alcohol use: Not Currently    Drug use: Not Currently     Types: Cannabis, Cocaine   Other Topics Concern    Caffeine Concern No    Stress Concern No    Weight Concern Yes    Special Diet No    Exercise Yes    Seat Belt Yes     Social Drivers of Health      Received from [facility]    [facility]     Family History:  Family History   Problem Relation Age of Onset    Mental Disorder Mother     Diabetes Mother     Asthma Mother     Cancer Father         Father's side had different types of cancer    Diabetes Father         My aunt and uncle had cancer and passed away from it    Cancer Maternal Grandfather      Allergies:  Allergies[1]  Current Meds:  Medications Ordered Prior to Encounter[2]      REVIEW OF SYSTEMS   Constitutional: no fatigue normal energy no weight changes   HENT: normal sinuses and no mouth issues   Eyes: . normal vision no eye pain   Respiratory: As above  Cardiovascular: no CP, or palpitations   Gastrointestinal: normal bowels and no abd pains   Genitourinary:  normal urination no hematuria, no frequency   Musculoskeletal: no pains in arms/legs, normal range of motion   Skin: no rashes or skin lesions that are new   Neurological:  no weakness, no numbness, normal gait   Hematological:  no bruises or bleeding   Psychiatric/Behavioral: normal mood no anxiety normal behavior     /76 (BP Location: Right arm, Patient  Position: Sitting, Cuff Size: adult)   Pulse 85   Temp 97.6 °F (36.4 °C) (Temporal)   Resp 16   Ht 5' 3.75\" (1.619 m)   Wt 194 lb 9.6 oz (88.3 kg)   LMP  (LMP Unknown)   SpO2 99%   BMI 33.67 kg/m²     PHYSICAL EXAM:   Constitutional: Vital signs reviewed as noted, well developed, in no acute distress.   HENT: NCAT  Eyes: pupils reactive bilaterally  Cardiovascular: nl s1 s2 no m/r/g  Pulmonary/Chest: CTA bilaterally with no wheezes  Extremities: no pedal edema   Neurological:  no weakness in UE and LE, reflexes are normal  Skin: no rashes or bruises on visualized skin, not undressed   Psychiatric:normal mood              [1]   Allergies  Allergen Reactions    Dust Mite Extract OTHER (SEE COMMENTS)    Seasonal OTHER (SEE COMMENTS)     Runny nose   [2]   Current Outpatient Medications on File Prior to Visit   Medication Sig Dispense Refill    gabapentin 300 MG Oral Cap Take 2 capsules (600 mg total) by mouth in the morning, at noon, and at bedtime.      pantoprazole 40 MG Oral Tab EC Take 1 tablet (40 mg total) by mouth 2 (two) times daily before meals. 180 tablet 3    dicyclomine 10 MG Oral Cap Take 1 capsule (10 mg total) by mouth 3 (three) times daily before meals. 90 capsule 1    PEG 3350-KCl-Na Bicarb-NaCl 420 g Oral Recon Soln Take as directed by physician. 4000 mL 0    SUMAtriptan 50 MG Oral Tab TAKE 1 TABLET BY MOUTH AT ONSET OF HEADACHE. MAY REPEAT ONCE AFTER 2 HOURS- TAKE MAXIMUM OF 2 TABLETS PER DAY 9 tablet 3    meclizine 25 MG Oral Tab Take 1 tablet (25 mg total) by mouth 3 (three) times daily as needed for Dizziness. 30 tablet 3    erenumab-aooe (AIMOVIG) 70 MG/ML Subcutaneous Inject 1 mL (70 mg total) into the skin every 30 (thirty) days. 1 mL 11    triamcinolone 0.1 % External Cream Use twice a day to affected area for 2 weeks then take a break for 2 weeks then apply again for 2 weeks 45 g 3    levETIRAcetam 500 MG Oral Tab Take 1 tablet (500 mg total) by mouth 2 (two) times daily. 180 tablet 3      Current Facility-Administered Medications on File Prior to Visit   Medication Dose Route Frequency Provider Last Rate Last Admin    cyanocobalamin (Vitamin B12) 1000 MCG/ML injection 1,000 mcg  1,000 mcg Intramuscular Q30 Days Anh Rosado DO   1,000 mcg at 02/11/25 7977

## 2025-02-13 ENCOUNTER — TELEPHONE (OUTPATIENT)
Dept: INTERNAL MEDICINE CLINIC | Facility: CLINIC | Age: 60
End: 2025-02-13

## 2025-03-10 ENCOUNTER — OFFICE VISIT (OUTPATIENT)
Dept: INTERNAL MEDICINE CLINIC | Facility: CLINIC | Age: 60
End: 2025-03-10
Payer: MEDICARE

## 2025-03-10 VITALS
OXYGEN SATURATION: 99 % | SYSTOLIC BLOOD PRESSURE: 142 MMHG | HEIGHT: 63.75 IN | HEART RATE: 110 BPM | BODY MASS INDEX: 33.91 KG/M2 | DIASTOLIC BLOOD PRESSURE: 78 MMHG | TEMPERATURE: 98 F | WEIGHT: 196.19 LBS | RESPIRATION RATE: 16 BRPM

## 2025-03-10 DIAGNOSIS — E66.811 CLASS 1 OBESITY WITHOUT SERIOUS COMORBIDITY WITH BODY MASS INDEX (BMI) OF 30.0 TO 30.9 IN ADULT, UNSPECIFIED OBESITY TYPE: Primary | ICD-10-CM

## 2025-03-10 DIAGNOSIS — F33.1 MODERATE EPISODE OF RECURRENT MAJOR DEPRESSIVE DISORDER (HCC): ICD-10-CM

## 2025-03-10 DIAGNOSIS — R05.3 CHRONIC COUGH: ICD-10-CM

## 2025-03-10 DIAGNOSIS — E53.8 VITAMIN B12 DEFICIENCY: ICD-10-CM

## 2025-03-10 DIAGNOSIS — I10 PRIMARY HYPERTENSION: ICD-10-CM

## 2025-03-10 RX ORDER — TIRZEPATIDE 2.5 MG/.5ML
2.5 INJECTION, SOLUTION SUBCUTANEOUS WEEKLY
Qty: 2 ML | Refills: 0 | Status: SHIPPED | OUTPATIENT
Start: 2025-03-10

## 2025-03-10 RX ADMIN — CYANOCOBALAMIN 1000 MCG: 1000 INJECTION, SOLUTION INTRAMUSCULAR; SUBCUTANEOUS at 12:10:00

## 2025-03-10 NOTE — PATIENT INSTRUCTIONS
I have resent the Zepbound    Continue with diet and lifestyle modifications    You received your vitamin B12 shot today    See me back on April 10 at 1P.m. for your physical

## 2025-03-10 NOTE — PROGRESS NOTES
Patient Office Visit    ASSESSMENT AND PLAN:   1. Class 1 obesity without serious comorbidity with body mass index (BMI) of 30.0 to 30.9 in adult, unspecified obesity type  Note: Will send Zepbound again to see if it is covered.  Patient is still prediabetic and will benefit from Zepbound  - Tirzepatide-Weight Management (ZEPBOUND) 2.5 MG/0.5ML Subcutaneous Solution Auto-injector; Inject 2.5 mg into the skin once a week.  Dispense: 2 mL; Refill: 0    2. Chronic cough  Note: Advised that patient needs her inhaler regularly.  She does not want to consider any inhalers.  She needs to stop the smoking.  Will obtain a chest x-ray  - XR CHEST PA + LAT CHEST (CPT=71046); Future    3. Vitamin B12 deficiency  Note: Vitamin B12 was provided today    4.  Depressive  Note: Recently lost her father but she has been taking the citalopram and has needing more Xanax than usual but she is trying to take it sparingly    5.  Primary hypertension  Note: Patient was on medications before but was stopped as her blood pressure was dropping too low.  Today she was a bit anxious due to grief therefore we will continue to monitor for now.  Return to clinic in a month for her physical      Patient/Caregiver Education: Patient/Caregiver Education: There are no barriers to learning. Medical education done. Outcome: Patient verbalizes understanding. Patient is notified to call with any questions, complications, allergies, or worsening or changing symptoms.  Patient is to call with any side effects or complications from the treatments as a result of today.      Reviewed Past Medical History and   Patient Active Problem List   Diagnosis    Cognitive deficits    Anxiety    Raynaud's phenomenon without gangrene    Excessive sleepiness    Intractable chronic migraine without aura and with status migrainosus    Class 1 obesity without serious comorbidity with body mass index (BMI) of 30.0 to 30.9 in adult    Gastroesophageal reflux disease    Primary  hypertension    Cervical radiculopathy    Vitamin B12 deficiency    Prediabetes    Female stress incontinence    Nonintractable generalized idiopathic epilepsy without status epilepticus (HCC)    Irritable bowel syndrome with constipation    Moderate episode of recurrent major depressive disorder (HCC)       No orders of the defined types were placed in this encounter.    Requested Prescriptions     Signed Prescriptions Disp Refills    Tirzepatide-Weight Management (ZEPBOUND) 2.5 MG/0.5ML Subcutaneous Solution Auto-injector 2 mL 0     Sig: Inject 2.5 mg into the skin once a week.         Anh Rosado DO  CC:  Chief Complaint   Patient presents with    Follow - Up         HPI:   Leila Alarcon is a 59-year-old female who presents for a follow-up    Obesity: She called the insurance and she still has not gotten the Zepbound.  She is requesting the medications to be sent again  Depression: She recently lost her father and even though she did not have a relationship with them for a long time she did reconcile.  She has been taking the Lexapro.  She needed Xanax more than usual this time but she tries to take it sparingly  Cough: She continues to smoke and has not scheduled her CT lung get.  She does not use her inhalers.  She just wants to make sure her lungs are okay.   Past Medical History:    Abdominal pain    Allergic rhinitis    Anxiety    Anxiety state    Back pain    Back problem    DEGENERATIVE DISC    Bloating    Body piercing    Cancer (HCC)    biopsy of breast and polyps    Constipation    Crohn disease (HCC)    Decorative tattoo    Depression    Diverticulosis of large intestine    Dizziness    Esophageal reflux    Heartburn    History of depression    Hypothyroidism    Don't know    Indigestion    Irregular bowel habits    Itch of skin    Migraines    Nausea and vomiting    Night sweats    Obesity    Pain with bowel movements    Seizure disorder (HCC)    Stool incontinence    Stress     Ulcerative colitis (HCC)    Vomiting    Wears glasses    Weight gain       Past Surgical History:   Procedure Laterality Date    Bunionectomy Right     Cholecystectomy      Colectomy      Colon surgery      partial resection    Colonoscopy N/A 2024    Procedure: COLONOSCOPY;  Surgeon: Ramiro Celestin MD;  Location:  ENDOSCOPY    Hernia repair      Hernia surgery      Coral localization wire 1 site left (cpt=19281) Left     benign , several yrs ago          Other surgical history      Removal gallbladder      Repair of hammertoe,one Right     Wrist fracture surgery      plate and screws  left       Social History:  Social History     Socioeconomic History    Marital status: Single   Tobacco Use    Smoking status: Every Day     Current packs/day: 0.50     Average packs/day: 0.5 packs/day for 44.2 years (22.1 ttl pk-yrs)     Types: Cigarettes     Start date: 1981    Smokeless tobacco: Never   Vaping Use    Vaping status: Never Used   Substance and Sexual Activity    Alcohol use: Not Currently    Drug use: Not Currently     Types: Cannabis, Cocaine   Other Topics Concern    Caffeine Concern No    Stress Concern No    Weight Concern Yes    Special Diet No    Exercise Yes    Seat Belt Yes     Social Drivers of Health      Received from [place]    [facility]     Family History:  Family History   Problem Relation Age of Onset    Mental Disorder Mother     Diabetes Mother     Asthma Mother     Cancer Father         Father's side had different types of cancer    Diabetes Father         My aunt and uncle had cancer and passed away from it    Cancer Maternal Grandfather      Allergies:  Allergies[1]  Current Meds:  Medications Ordered Prior to Encounter[2]      REVIEW OF SYSTEMS   Constitutional: no fatigue normal energy no weight changes   HENT: normal sinuses and no mouth issues   Eyes: . normal vision no eye pain   Respiratory: normal respirations no cough   Cardiovascular: no CP, or  palpitations   Gastrointestinal: normal bowels and no abd pains   Genitourinary:  normal urination no hematuria, no frequency   Musculoskeletal: no pains in arms/legs, normal range of motion   Skin: no rashes or skin lesions that are new   Neurological:  no weakness, no numbness, normal gait   Hematological:  no bruises or bleeding   Psychiatric/Behavioral: normal mood no anxiety normal behavior     /78 (BP Location: Right arm, Patient Position: Sitting, Cuff Size: adult)   Pulse 110   Temp 97.6 °F (36.4 °C) (Temporal)   Resp 16   Ht 5' 3.75\" (1.619 m)   Wt 196 lb 3.2 oz (89 kg)   LMP  (LMP Unknown)   SpO2 99%   BMI 33.94 kg/m²     PHYSICAL EXAM:   Constitutional: Vital signs reviewed as noted, well developed, in no acute distress.   HENT: NCAT  Eyes: pupils reactive bilaterally  Cardiovascular: nl s1 s2 no m/r/g  Pulmonary/Chest: CTA bilaterally with no wheezes  Extremities: no pedal edema   Neurological:  no weakness in UE and LE, reflexes are normal  Skin: no rashes or bruises on visualized skin, not undressed   Psychiatric:normal mood              [1]   Allergies  Allergen Reactions    Dust Mite Extract OTHER (SEE COMMENTS)    Seasonal OTHER (SEE COMMENTS)     Runny nose   [2]   Current Outpatient Medications on File Prior to Visit   Medication Sig Dispense Refill    albuterol 108 (90 Base) MCG/ACT Inhalation Aero Soln Inhale 1 puff into the lungs every 6 (six) hours as needed. 18 g 0    ALPRAZolam (XANAX) 0.5 MG Oral Tab Take 1 tablet (0.5 mg total) by mouth nightly as needed. 30 tablet 0    escitalopram 10 MG Oral Tab Take 1 tablet (10 mg total) by mouth daily. 90 tablet 0    gabapentin 300 MG Oral Cap Take 2 capsules (600 mg total) by mouth in the morning, at noon, and at bedtime.      pantoprazole 40 MG Oral Tab EC Take 1 tablet (40 mg total) by mouth 2 (two) times daily before meals. 180 tablet 3    dicyclomine 10 MG Oral Cap Take 1 capsule (10 mg total) by mouth 3 (three) times daily before  meals. 90 capsule 1    PEG 3350-KCl-Na Bicarb-NaCl 420 g Oral Recon Soln Take as directed by physician. 4000 mL 0    SUMAtriptan 50 MG Oral Tab TAKE 1 TABLET BY MOUTH AT ONSET OF HEADACHE. MAY REPEAT ONCE AFTER 2 HOURS- TAKE MAXIMUM OF 2 TABLETS PER DAY 9 tablet 3    meclizine 25 MG Oral Tab Take 1 tablet (25 mg total) by mouth 3 (three) times daily as needed for Dizziness. 30 tablet 3    erenumab-aooe (AIMOVIG) 70 MG/ML Subcutaneous Inject 1 mL (70 mg total) into the skin every 30 (thirty) days. 1 mL 11    triamcinolone 0.1 % External Cream Use twice a day to affected area for 2 weeks then take a break for 2 weeks then apply again for 2 weeks 45 g 3    levETIRAcetam 500 MG Oral Tab Take 1 tablet (500 mg total) by mouth 2 (two) times daily. 180 tablet 3     Current Facility-Administered Medications on File Prior to Visit   Medication Dose Route Frequency Provider Last Rate Last Admin    cyanocobalamin (Vitamin B12) 1000 MCG/ML injection 1,000 mcg  1,000 mcg Intramuscular Q30 Days Anh Rosado DO   1,000 mcg at 02/11/25 1340

## 2025-03-14 ENCOUNTER — TELEPHONE (OUTPATIENT)
Dept: INTERNAL MEDICINE CLINIC | Facility: CLINIC | Age: 60
End: 2025-03-14

## 2025-03-14 NOTE — TELEPHONE ENCOUNTER
Incoming fax from [facility] request for patients Zepbound 2.5mg/0.5mL pen    Placed in SV in basket for review

## 2025-03-17 ENCOUNTER — TELEPHONE (OUTPATIENT)
Dept: NEUROLOGY | Facility: CLINIC | Age: 60
End: 2025-03-17

## 2025-03-17 NOTE — TELEPHONE ENCOUNTER
Rec'd DOS 3/13/25 Neurology Clearance Request for EGD, date of procedure TBD. Placed in RN bin for p/u.

## 2025-03-17 NOTE — TELEPHONE ENCOUNTER
Received fax from [facility] requesting Neurology Clearance for upcoming procedure:     Procedure: EGD/Colonoscopy  Procedure date:TBD pending clearance     Patient last seen on 8/29/24 for cervical radiculopathy, seizures, migraines and dizziness.     Medications prescribed by neurology are:  levetiracetam, sumatripan, meclizine and Aimovig.     Form endorsed to provider for review and completion if appropriate.

## 2025-04-01 ENCOUNTER — HOSPITAL ENCOUNTER (OUTPATIENT)
Dept: ULTRASOUND IMAGING | Age: 60
Discharge: HOME | End: 2025-04-01
Attending: INTERNAL MEDICINE
Payer: MEDICARE

## 2025-04-01 DIAGNOSIS — R92.30 DENSE BREAST: ICD-10-CM

## 2025-04-01 PROCEDURE — 76641 ULTRASOUND BREAST COMPLETE: CPT | Performed by: INTERNAL MEDICINE

## 2025-04-03 ENCOUNTER — HOSPITAL ENCOUNTER (OUTPATIENT)
Age: 60
Discharge: HOME | End: 2025-04-03
Payer: MEDICARE

## 2025-04-03 ENCOUNTER — TELEPHONE (OUTPATIENT)
Dept: NEUROLOGY | Facility: CLINIC | Age: 60
End: 2025-04-03

## 2025-04-03 ENCOUNTER — OFFICE VISIT (OUTPATIENT)
Dept: NEUROLOGY | Facility: CLINIC | Age: 60
End: 2025-04-03
Payer: MEDICARE

## 2025-04-03 VITALS
HEART RATE: 76 BPM | WEIGHT: 195 LBS | TEMPERATURE: 98 F | BODY MASS INDEX: 34.55 KG/M2 | SYSTOLIC BLOOD PRESSURE: 126 MMHG | OXYGEN SATURATION: 99 % | HEIGHT: 63 IN | RESPIRATION RATE: 22 BRPM | DIASTOLIC BLOOD PRESSURE: 85 MMHG

## 2025-04-03 VITALS
DIASTOLIC BLOOD PRESSURE: 72 MMHG | RESPIRATION RATE: 16 BRPM | BODY MASS INDEX: 34 KG/M2 | WEIGHT: 196 LBS | HEART RATE: 73 BPM | SYSTOLIC BLOOD PRESSURE: 122 MMHG

## 2025-04-03 DIAGNOSIS — G43.711 INTRACTABLE CHRONIC MIGRAINE WITHOUT AURA AND WITH STATUS MIGRAINOSUS: Primary | ICD-10-CM

## 2025-04-03 DIAGNOSIS — L30.9 DERMATITIS: ICD-10-CM

## 2025-04-03 DIAGNOSIS — B09 VIRAL EXANTHEM: ICD-10-CM

## 2025-04-03 DIAGNOSIS — B08.4 HAND, FOOT AND MOUTH DISEASE: Primary | ICD-10-CM

## 2025-04-03 DIAGNOSIS — G43.711 INTRACTABLE CHRONIC MIGRAINE WITHOUT AURA AND WITH STATUS MIGRAINOSUS: ICD-10-CM

## 2025-04-03 DIAGNOSIS — R51.9 INTRACTABLE EPISODIC HEADACHE, UNSPECIFIED HEADACHE TYPE: Primary | ICD-10-CM

## 2025-04-03 DIAGNOSIS — M54.12 CERVICAL RADICULOPATHY: ICD-10-CM

## 2025-04-03 DIAGNOSIS — R51.9 INTRACTABLE EPISODIC HEADACHE, UNSPECIFIED HEADACHE TYPE: ICD-10-CM

## 2025-04-03 DIAGNOSIS — G40.909 SEIZURE DISORDER (HCC): ICD-10-CM

## 2025-04-03 PROCEDURE — 99213 OFFICE O/P EST LOW 20 MIN: CPT

## 2025-04-03 PROCEDURE — 3074F SYST BP LT 130 MM HG: CPT | Performed by: OTHER

## 2025-04-03 PROCEDURE — G2211 COMPLEX E/M VISIT ADD ON: HCPCS | Performed by: OTHER

## 2025-04-03 PROCEDURE — 99215 OFFICE O/P EST HI 40 MIN: CPT | Performed by: OTHER

## 2025-04-03 PROCEDURE — 3078F DIAST BP <80 MM HG: CPT | Performed by: OTHER

## 2025-04-03 RX ORDER — MECLIZINE HYDROCHLORIDE 25 MG/1
25 TABLET ORAL 3 TIMES DAILY PRN
Qty: 30 TABLET | Refills: 3 | Status: SHIPPED | OUTPATIENT
Start: 2025-04-03

## 2025-04-03 RX ORDER — ATOGEPANT 60 MG/1
60 TABLET ORAL DAILY
Qty: 8 TABLET | Refills: 0 | COMMUNITY
Start: 2025-04-03

## 2025-04-03 RX ORDER — METHYLPREDNISOLONE 4 MG/1
TABLET ORAL
Qty: 1 EACH | Refills: 0 | Status: SHIPPED | OUTPATIENT
Start: 2025-04-03

## 2025-04-03 RX ORDER — LEVETIRACETAM 500 MG/1
500 TABLET ORAL 2 TIMES DAILY
Qty: 180 TABLET | Refills: 3 | Status: SHIPPED | OUTPATIENT
Start: 2025-04-03

## 2025-04-03 RX ORDER — ATOGEPANT 60 MG/1
60 TABLET ORAL DAILY
Qty: 30 TABLET | Refills: 5 | Status: SHIPPED | OUTPATIENT
Start: 2025-04-03

## 2025-04-03 RX ORDER — HYDROXYZINE HYDROCHLORIDE 25 MG/1
TABLET, FILM COATED ORAL EVERY 4 HOURS PRN
Qty: 20 TABLET | Refills: 0 | Status: SHIPPED | OUTPATIENT
Start: 2025-04-03 | End: 2025-05-03

## 2025-04-03 RX ORDER — SUMATRIPTAN 50 MG/1
TABLET, FILM COATED ORAL
Qty: 9 TABLET | Refills: 3 | Status: SHIPPED | OUTPATIENT
Start: 2025-04-03

## 2025-04-03 NOTE — DISCHARGE INSTRUCTIONS
Increase water intake 2-3 liters daily   You may apply Aquaphor or Vaseline to your forearms to help moisturize the skin.  Please trim your nails so that you do not scratch your nails and impaired the skin integrity to your forearms.  If you develop any signs or symptoms of infection such as redness with swelling and discharge please return for care.   Spine appears normal, range of motion is not limited, no muscle or joint tenderness

## 2025-04-03 NOTE — TELEPHONE ENCOUNTER
Patient in office today for follow up.  Per Dr. Luque, to give samples of Qulipta.     Qulipta samples given to pt by MA staff.     Documented sample in Epic.

## 2025-04-03 NOTE — ED INITIAL ASSESSMENT (HPI)
Presents for rash to right forearm that started a few days ago, itching and burning sensation. Unsure of shingles vaccine status.

## 2025-04-03 NOTE — PROGRESS NOTES
[facility] Neurology Outpatient Progress Note  Date of service: 4/3/2025    Assessment:     ICD-10-CM    1. Intractable episodic headache, unspecified headache type  R51.9       2. Seizure disorder (HCC) non convulsive; seizure free G40.909       3. Cervical radiculopathy  M54.12         Assessment & Plan    Dc  aimovig  Start qulipta 60mg  Cont keppra 500 mg bid  Imitrex, meclizine prn, refills sent  PCP to follow  See orders and medications filed with this encounter. The patient indicates understanding of these issues and agrees with the plan.  RTC 4 months  Pt should go ER for any new or worsening symptoms.  A total of 40 minutes were spent on patient care,  more than 50% was spent counseling patient/family regarding studies' results, assessment, treatment options and care plan, 10 minutes were spent in (pre- and/or during visit) reviewing clinical data including imaging, labs and progress notes related to therapy or treatment.      Subjective:   History:  History of Present Illness      Patient here to follow up regarding migraine and dizziness, neck pain.   States not sure if aimovig still helps, seems not lasting long as initially; no seizure reported.  She saw Dr Ojeda (epileptologist) at [facility] before, has not followed up recently.  Pt has been following up with me regularly since 2021 for complicated neurologic conditions management. migraine and questionable non convulsive seizure who she decided to stay on keppra.    History/Other:   REVIEW OF SYSTEMS:  A 10-point system was reviewed. Pertinent positives and negatives are noted as above     Current Outpatient Medications:   Atogepant (QULIPTA) 60 MG Oral Tab, Take 60 mg by mouth daily., Disp: 30 tablet, Rfl: 5  levETIRAcetam 500 MG Oral Tab, Take 1 tablet (500 mg total) by mouth 2 (two) times daily., Disp: 180 tablet, Rfl: 3  SUMAtriptan 50 MG Oral Tab, TAKE 1 TABLET BY MOUTH AT ONSET OF HEADACHE. MAY REPEAT ONCE AFTER 2 HOURS- TAKE MAXIMUM OF 2 TABLETS PER DAY,  Disp: 9 tablet, Rfl: 3  Tirzepatide-Weight Management (ZEPBOUND) 2.5 MG/0.5ML Subcutaneous Solution Auto-injector, Inject 2.5 mg into the skin once a week., Disp: 2 mL, Rfl: 0  albuterol 108 (90 Base) MCG/ACT Inhalation Aero Soln, Inhale 1 puff into the lungs every 6 (six) hours as needed., Disp: 18 g, Rfl: 0  ALPRAZolam (XANAX) 0.5 MG Oral Tab, Take 1 tablet (0.5 mg total) by mouth nightly as needed., Disp: 30 tablet, Rfl: 0  escitalopram 10 MG Oral Tab, Take 1 tablet (10 mg total) by mouth daily., Disp: 90 tablet, Rfl: 0  gabapentin 300 MG Oral Cap, Take 2 capsules (600 mg total) by mouth in the morning, at noon, and at bedtime., Disp: , Rfl:   pantoprazole 40 MG Oral Tab EC, Take 1 tablet (40 mg total) by mouth 2 (two) times daily before meals., Disp: 180 tablet, Rfl: 3  meclizine 25 MG Oral Tab, Take 1 tablet (25 mg total) by mouth 3 (three) times daily as needed for Dizziness., Disp: 30 tablet, Rfl: 3  triamcinolone 0.1 % External Cream, Use twice a day to affected area for 2 weeks then take a break for 2 weeks then apply again for 2 weeks, Disp: 45 g, Rfl: 3  Allergies:  Allergies[1]  Past Medical History:    Abdominal pain    Allergic rhinitis    Anxiety    Anxiety state    Back pain    Back problem    DEGENERATIVE DISC    Bloating    Body piercing    Cancer (HCC)    biopsy of breast and polyps    Constipation    Crohn disease (HCC)    Decorative tattoo    Depression    Diverticulosis of large intestine    Dizziness    Esophageal reflux    Heartburn    History of depression    Hypothyroidism    Don't know    Indigestion    Irregular bowel habits    Itch of skin    Migraines    Nausea and vomiting    Night sweats    Obesity    Pain with bowel movements    Seizure disorder (HCC)    Stool incontinence    Stress    Ulcerative colitis (HCC)    Vomiting    Wears glasses    Weight gain     Past Surgical History:   Procedure Laterality Date    Bunionectomy Right 2023    Cholecystectomy      Colectomy      Colon  surgery      partial resection    Colonoscopy N/A 2024    Procedure: COLONOSCOPY;  Surgeon: Ramiro Celestin MD;  Location:  ENDOSCOPY    [facility]      Hernia repair      Hernia surgery      Coral localization wire 1 site left (cpt=19281) Left     benign , several yrs ago          Other surgical history      Removal gallbladder      Repair of hammertoe,one Right     Wrist fracture surgery      plate and screws  left     Social History:  Social History     Tobacco Use    Smoking status: Every Day     Current packs/day: 0.50     Average packs/day: 0.5 packs/day for 44.3 years (22.1 ttl pk-yrs)     Types: Cigarettes     Start date: 1981    Smokeless tobacco: Never   Substance Use Topics    Alcohol use: Not Currently     Family History   Problem Relation Age of Onset    Mental Disorder Mother     Diabetes Mother     Asthma Mother     Cancer Father         Father's side had different types of cancer    Diabetes Father         My aunt and uncle had cancer and passed away from it    Cancer Maternal Grandfather        Objective:   /72   Pulse 73   Resp 16   Wt 196 lb (88.9 kg)   LMP  (LMP Unknown)   BMI 33.91 kg/m²   Mental status: A & O X 3  Language: no aphasia  Speech: no dysarthria  CN II-XII: intact   Motor strength: 5/5 all extremities  Tone: normal  Coordination: normal  Sensory: symmetric  Gait: normal    Test reviewed on 4/3/2025      Alejandra Luque MD  Neurology  [facility]  4/3/2025, 10:04 AM  CC: Anh Rosado DO         [1]   Allergies  Allergen Reactions    Dust Mite Extract OTHER (SEE COMMENTS)    Seasonal OTHER (SEE COMMENTS)     Runny nose

## 2025-04-03 NOTE — PATIENT INSTRUCTIONS
Refill policies:    Allow 2-3 business days for refills; controlled substances may take longer.  Contact your pharmacy at least 5 days prior to running out of medication and have them send an electronic request or submit request through the “request refill” option in your [employer] account.  Refills are not addressed on weekends; covering physicians do not authorize routine medications on weekends.  No narcotics or controlled substances are refilled after noon on Fridays or by on call physicians.  By law, narcotics must be electronically prescribed.  A 30 day supply with no refills is the maximum allowed.  If your prescription is due for a refill, you may be due for a follow up appointment.  To best provide you care, patients receiving routine medications need to be seen at least once a year.  Patients receiving narcotic/controlled substance medications need to be seen at least once every 3 months.  In the event that your preferred pharmacy does not have the requested medication in stock (e.g. Backordered), it is your responsibility to find another pharmacy that has the requested medication available.  We will gladly send a new prescription to that pharmacy at your request.    Scheduling Tests:    If your physician has ordered radiology tests such as MRI or CT scans, please contact Central Scheduling at [phone] right away to schedule the test.  Once scheduled, the [facility] Centralized Referral Team will work with your insurance carrier to obtain pre-certification or prior authorization.  Depending on your insurance carrier, approval may take 3-10 days.  It is highly recommended patients assure they have received an authorization before having a test performed.  If test is done without insurance authorization, patient may be responsible for the entire amount billed.      Precertification and Prior Authorizations:  If your physician has recommended that you have a procedure or additional testing performed the [facility]  Centralized Referral Team will contact your insurance carrier to obtain pre-certification or prior authorization.    You are strongly encouraged to contact your insurance carrier to verify that your procedure/test has been approved and is a COVERED benefit.  Although the [facility] Centralized Referral Team does its due diligence, the insurance carrier gives the disclaimer that \"Although the procedure is authorized, this does not guarantee payment.\"    Ultimately the patient is responsible for payment.   Thank you for your understanding in this matter.  Paperwork Completion:  If you require FMLA or disability paperwork for your recovery, please make sure to either drop it off or have it faxed to our office at [phone]. Be sure the form has your name and date of birth on it.  The form will be faxed to our Forms Department and they will complete it for you.  There is a 25$ fee for all forms that need to be filled out.  Please be aware there is a 10-14 day turnaround time.  You will need to sign a release of information (DOMINGA) form if your paperwork does not come with one.  You may call the Forms Department with any questions at [phone].  Their fax number is [phone].

## 2025-04-03 NOTE — ED PROVIDER NOTES
Patient Seen in: [facility]      History     Chief Complaint   Patient presents with    Rash     Stated Complaint: rash on right arm    Subjective:   HPI      59-year-old female with anxiety, Crohn's disease, thyroid disorder presents today with complaints of rash to bilateral forearms and hands.  Patient denies any changes in detergents or soaps.  Patient states that she applied an itch cream to her forearms with minimal relief.  Patient denies any exposure or contact with young children.    Objective:     Past Medical History:    Abdominal pain    Allergic rhinitis    Anxiety    Anxiety state    Back pain    Back problem    DEGENERATIVE DISC    Bloating    Body piercing    Cancer (HCC)    biopsy of breast and polyps    Constipation    Crohn disease (HCC)    Decorative tattoo    Depression    Diverticulosis of large intestine    Dizziness    Esophageal reflux    Heartburn    History of depression    Hypothyroidism    Don't know    Indigestion    Irregular bowel habits    Itch of skin    Migraines    Nausea and vomiting    Night sweats    Obesity    Pain with bowel movements    Seizure disorder (HCC)    Stool incontinence    Stress    Ulcerative colitis (HCC)    Vomiting    Wears glasses    Weight gain              Past Surgical History:   Procedure Laterality Date    Bunionectomy Right     Cholecystectomy      Colectomy      Colon surgery      partial resection    Colonoscopy N/A 2024    Procedure: COLONOSCOPY;  Surgeon: Ramiro Celestin MD;  Location:  ENDOSCOPY    [facility]      Hernia repair      Hernia surgery      [facility] localization wire 1 site left (cpt=19281) Left     benign , several yrs ago          Other surgical history      Removal gallbladder      Repair of hammertoe,one Right     Wrist fracture surgery      plate and screws  left                Social History     Socioeconomic History    Marital status: Single   Tobacco Use    Smoking status: Every Day     Current  packs/day: 0.50     Average packs/day: 0.5 packs/day for 44.3 years (22.1 ttl pk-yrs)     Types: Cigarettes     Start date: 1/1/1981    Smokeless tobacco: Never   Vaping Use    Vaping status: Never Used   Substance and Sexual Activity    Alcohol use: Not Currently    Drug use: Not Currently     Types: Cannabis, Cocaine   Other Topics Concern    Caffeine Concern No    Stress Concern No    Weight Concern Yes    Special Diet No    Exercise Yes    Seat Belt Yes     Social Drivers of Health      [facility] [place] [place]    [facility]              Review of Systems   Constitutional: Negative.    HENT: Negative.     Eyes: Negative.    Respiratory: Negative.     Cardiovascular: Negative.    Gastrointestinal: Negative.    Endocrine: Negative.    Genitourinary: Negative.    Musculoskeletal: Negative.    Skin:  Positive for rash.   Allergic/Immunologic: Negative.    Neurological: Negative.    Hematological: Negative.    Psychiatric/Behavioral: Negative.         Positive for stated complaint: rash on right arm  Other systems are as noted in HPI.  Constitutional and vital signs reviewed.      All other systems reviewed and negative except as noted above.    Physical Exam     ED Triage Vitals [04/03/25 1048]   /85   Pulse 76   Resp 22   Temp 97.8 °F (36.6 °C)   Temp src Oral   SpO2 99 %   O2 Device None (Room air)       Current Vitals:   Vital Signs  BP: 126/85  Pulse: 76  Resp: 22  Temp: 97.8 °F (36.6 °C)  Temp src: Oral    Oxygen Therapy  SpO2: 99 %  O2 Device: None (Room air)        Physical Exam  Vitals and nursing note reviewed.   Constitutional:       Appearance: Normal appearance. She is normal weight.   HENT:      Head: Normocephalic.      Right Ear: External ear normal.      Left Ear: External ear normal.      Mouth/Throat:      Mouth: Mucous membranes are moist.      Pharynx: Oropharynx is clear. Posterior oropharyngeal erythema present.      Comments: Erythemic small red spots appreciated in the  buccal surfaces of the mouth.  Eyes:      General: No scleral icterus.     Extraocular Movements: Extraocular movements intact.      Conjunctiva/sclera: Conjunctivae normal.      Pupils: Pupils are equal, round, and reactive to light.   Pulmonary:      Effort: Pulmonary effort is normal.   Musculoskeletal:      Cervical back: Normal range of motion and neck supple.   Skin:     Findings: Erythema and rash present.      Comments: Red raised dermatitis appreciated to bilateral forearms and left palm.   Neurological:      Mental Status: She is alert.   Psychiatric:         Mood and Affect: Mood normal.           ED Course   Labs Reviewed - No data to display                MDM      59-year-old female with anxiety, Crohn's disease, thyroid disorder presents today with complaints of rash to bilateral forearms and hands.  Patient denies any changes in detergents or soaps.  Patient states that she applied an itch cream to her forearms with minimal relief.  Patient denies any exposure or contact with young children.  Vital signs: Please see EMR.  Physical exam: Please see exam.  Differential diagnosis: Hand-foot-and-mouth, dermatitis, contact dermatitis.  Based on physical exam and HPI will diagnosed with hand-foot-and-mouth, viral exanthem and dermatitis.  Will prescribe hydroxyzine and a Medrol Dosepak.  Patient instructed to apply Aquaphor or Vaseline to her forearms.  ED precautions given.        Medical Decision Making  59-year-old female with anxiety, Crohn's disease, thyroid disorder presents today with complaints of rash to bilateral forearms and hands.  Patient denies any changes in detergents or soaps.  Patient states that she applied an itch cream to her forearms with minimal relief.  Patient denies any exposure or contact with young children.    Problems Addressed:  Dermatitis: acute illness or injury  Hand, foot and mouth disease: acute illness or injury  Viral exanthem: acute illness or  injury    Risk  Prescription drug management.        Disposition and Plan     Clinical Impression:  1. Hand, foot and mouth disease    2. Viral exanthem    3. Dermatitis         Disposition:  Discharge  4/3/2025 11:03 am    Follow-up:  Anh Rosado DO  [address] [address]  [phone]    Schedule an appointment as soon as possible for a visit             Medications Prescribed:  Current Discharge Medication List        START taking these medications    Details   methylPREDNISolone (MEDROL) 4 MG Oral Tablet Therapy Pack Dosepack: take as directed  Qty: 1 each, Refills: 0      hydrOXYzine 25 MG Oral Tab Take 1-2 tablets (25-50 mg total) by mouth every 4 (four) hours as needed for Itching.  Qty: 20 tablet, Refills: 0                 Supplementary Documentation:

## 2025-04-04 DIAGNOSIS — E66.811 CLASS 1 OBESITY WITHOUT SERIOUS COMORBIDITY WITH BODY MASS INDEX (BMI) OF 30.0 TO 30.9 IN ADULT, UNSPECIFIED OBESITY TYPE: ICD-10-CM

## 2025-04-04 DIAGNOSIS — M54.12 CERVICAL RADICULOPATHY: ICD-10-CM

## 2025-04-04 RX ORDER — TIRZEPATIDE 2.5 MG/.5ML
2.5 INJECTION, SOLUTION SUBCUTANEOUS WEEKLY
Qty: 2 ML | Refills: 0 | OUTPATIENT
Start: 2025-04-04

## 2025-04-04 RX ORDER — TIRZEPATIDE 5 MG/.5ML
5 INJECTION, SOLUTION SUBCUTANEOUS WEEKLY
Qty: 6 ML | Refills: 0 | Status: SHIPPED | OUTPATIENT
Start: 2025-04-04 | End: 2025-04-26

## 2025-04-04 RX ORDER — GABAPENTIN 300 MG/1
600 CAPSULE ORAL 3 TIMES DAILY
Qty: 180 CAPSULE | Refills: 3 | Status: SHIPPED | OUTPATIENT
Start: 2025-04-04

## 2025-04-04 NOTE — TELEPHONE ENCOUNTER
Zepbound 2.5 mg  Filled 3-10-25  Qty 2 mL  0 refills  Future Appointments   Date Time Provider Department Center   4/10/2025 10:30 AM [facility] XR RM1 [facility] [place]   4/10/2025 11:30 AM EMG 08 NURSE EM[facility] 8 [facility] [place]   4/10/2025  1:00 PM Anh Rosado DO [facility] 8 [facility] [place]   5/12/2025 11:30 AM EMG 08 NURSE EM[facility] 8 [facility] [place]   LOV 3-10-25 SV

## 2025-04-06 DIAGNOSIS — M54.12 CERVICAL RADICULOPATHY: ICD-10-CM

## 2025-04-06 DIAGNOSIS — E66.811 CLASS 1 OBESITY WITHOUT SERIOUS COMORBIDITY WITH BODY MASS INDEX (BMI) OF 30.0 TO 30.9 IN ADULT, UNSPECIFIED OBESITY TYPE: ICD-10-CM

## 2025-04-06 RX ORDER — LEVETIRACETAM 500 MG/1
500 TABLET ORAL 2 TIMES DAILY
Qty: 180 TABLET | Refills: 3 | OUTPATIENT
Start: 2025-04-06

## 2025-04-06 RX ORDER — GABAPENTIN 300 MG/1
600 CAPSULE ORAL 3 TIMES DAILY
Qty: 180 CAPSULE | Refills: 3 | OUTPATIENT
Start: 2025-04-06

## 2025-04-07 RX ORDER — TIRZEPATIDE 5 MG/.5ML
5 INJECTION, SOLUTION SUBCUTANEOUS WEEKLY
Qty: 6 ML | Refills: 0 | OUTPATIENT
Start: 2025-04-07 | End: 2025-04-29

## 2025-04-08 NOTE — TELEPHONE ENCOUNTER
Spoke with the pharmacy who states that the patient has refills on file and gabapentin needs to picked up. Will advise the patient.       Medication:  levETIRAcetam 500 MG Oral Tab + gabapentin 300 MG Oral Cap    Date of last refill: 04/03/2025 (#180/3) + 04/04/2025 (#180/3)  [place]     Last office visit: 04/03/2025  Due back to clinic per last office note:  Around 08/03/2025  Date next office visit scheduled:    Future Appointments   Date Time Provider Department Center   4/10/2025 10:30 AM [facility] XR RM1 [facility] XRAY [place]   4/10/2025 11:30 AM EMG 08 NURSE EM[facility] 8 [facility] [place]   4/10/2025  1:00 PM Anh Rosado, DO [facility] 8 [facility] [place]   5/12/2025 11:30 AM EMG 08 NURSE EMG 8 [facility] [place]   6/13/2025 10:00 AM COURI, PROCEDURE SGIEDW None           Last OV note recommendation:    Dc  aimovig  Start qulipta 60mg  Cont keppra 500 mg bid  Imitrex, meclizine prn, refills sent  PCP to follow  See orders and medications filed with this encounter. The patient indicates understanding of these issues and agrees with the plan.  RTC 4 months  Pt should go ER for any new or worsening symptoms.  A total of 40 minutes were spent on patient care,  more than 50% was spent counseling patient/family regarding studies' results, assessment, treatment options and care plan, 10 minutes were spent in (pre- and/or during visit) reviewing clinical data including imaging, labs and progress notes related to therapy or treatment.

## 2025-04-10 ENCOUNTER — HOSPITAL ENCOUNTER (OUTPATIENT)
Dept: GENERAL RADIOLOGY | Age: 60
Discharge: HOME | End: 2025-04-10
Attending: INTERNAL MEDICINE
Payer: MEDICARE

## 2025-04-10 DIAGNOSIS — R05.3 CHRONIC COUGH: ICD-10-CM

## 2025-04-10 PROCEDURE — 71046 X-RAY EXAM CHEST 2 VIEWS: CPT | Performed by: INTERNAL MEDICINE

## 2025-06-13 ENCOUNTER — ANESTHESIA (OUTPATIENT)
Dept: ENDOSCOPY | Facility: HOSPITAL | Age: 60
End: 2025-06-13
Payer: MEDICARE

## 2025-06-13 ENCOUNTER — HOSPITAL ENCOUNTER (OUTPATIENT)
Facility: HOSPITAL | Age: 60
Setting detail: HOSPITAL OUTPATIENT SURGERY
Discharge: HOME | End: 2025-06-13
Attending: INTERNAL MEDICINE | Admitting: INTERNAL MEDICINE
Payer: MEDICARE

## 2025-06-13 ENCOUNTER — ANESTHESIA EVENT (OUTPATIENT)
Dept: ENDOSCOPY | Facility: HOSPITAL | Age: 60
End: 2025-06-13
Payer: MEDICARE

## 2025-06-13 VITALS
BODY MASS INDEX: 33.66 KG/M2 | TEMPERATURE: 98 F | SYSTOLIC BLOOD PRESSURE: 131 MMHG | OXYGEN SATURATION: 96 % | HEIGHT: 63 IN | WEIGHT: 190 LBS | HEART RATE: 67 BPM | DIASTOLIC BLOOD PRESSURE: 88 MMHG | RESPIRATION RATE: 16 BRPM

## 2025-06-13 DIAGNOSIS — K21.9 GASTROESOPHAGEAL REFLUX DISEASE, UNSPECIFIED WHETHER ESOPHAGITIS PRESENT: ICD-10-CM

## 2025-06-13 PROCEDURE — 88305 TISSUE EXAM BY PATHOLOGIST: CPT | Performed by: INTERNAL MEDICINE

## 2025-06-13 RX ORDER — SODIUM CHLORIDE, SODIUM LACTATE, POTASSIUM CHLORIDE, CALCIUM CHLORIDE 600; 310; 30; 20 MG/100ML; MG/100ML; MG/100ML; MG/100ML
INJECTION, SOLUTION INTRAVENOUS CONTINUOUS
Status: DISCONTINUED | OUTPATIENT
Start: 2025-06-13 | End: 2025-06-13

## 2025-06-13 RX ADMIN — SODIUM CHLORIDE, SODIUM LACTATE, POTASSIUM CHLORIDE, CALCIUM CHLORIDE: 600; 310; 30; 20 INJECTION, SOLUTION INTRAVENOUS at 09:29:00

## 2025-06-13 NOTE — OPERATIVE REPORT
EGD Operative Report  Patient Name: Leila Alarcon  YOB: 1965  MRN: [id number]  Procedure: Esophagogastroduodenoscopy (EGD)  with cold forceps biopsies  Pre-operative Diagnosis & Indication:   LA grade C esophagitis  Post-operative Diagnosis:   5 cm hiatal hernia  LA grade A esophagitis, improved  Attending Endoscopist: Ramiro Celestin MD  Informed Consent: The planned procedure(s), the explanation of the procedure, its expected benefits, the potential complications and risks and possible alternatives and their benefits and risks were discussed with the patient or the patient's surrogate. The discussion of risks, not limited to but including bleeding, infection, perforation, adverse effects from anesthesia, need for emergency surgery/prolonged hospitalization,  cardiac arrhythmias,  and aspiration were discussed with patient.  Pt and/or surrogate understood the proposed procedure(s), its risks, benefits and alternatives and wish to proceed with procedure(s). All questions answered in full.  After all questions were answered to their satisfaction, a signed, informed, and witnessed consent was obtained.  Physical Exam: Heart: regular rate and rhythm. No rubs, murmurs, or gallops. Lungs: Clear to auscultation bilaterally. Abdomen: Soft, non-tender, non distended. No rebound tenderness, no guarding.   A TIME OUT WAS COMPLETED prior to the procedure to confirm the patient, procedure(s) and complete endoscopy safety procedure.  Sedation: Monitored Anesthesia Care; ASA class per anesthesiology team   Monitoring: Pulsoximetry, pulse, respirations, and blood pressure , vitals were monitored throughout the entire procedure under monitored anesthesia care.   Procedure: The patient was then brought to the endoscopy suite where his/her pulse, pulse oximetry and blood pressure were monitored. The patient was placed in the left lateral decubitus position and deep sedation was administered. Once adequate  sedation was achieved, a bite block was placed and a lubricated tip of an Olympus video upper endoscope was inserted through the oropharynx and gently manipulated through the esophagus into the stomach and the second portion of the duodenum. Upon withdrawal of the endoscope, careful visualization of the mucosa was performed. The endoscope was then withdrawn into the gastric antrum and placed in a retroflexed position.  The endoscope was then righted, and air was suctioned from the stomach.  The endoscope was then withdrawn from the patient, with careful visual inspection of the mucosa. The patient left the procedure room in stable condition for recovery. Findings and endotherapy as listed below  Toleration: Patient tolerated procedure well   Complications: No immediate complications   Technical Difficulty:  The procedure was not technically difficult   Estimated Blood Loss: Minimal, less than 5mL of estimated blood loss.   Findings and Therapeutics:  Esophagus:   2 foci of LA grade A esophagitis were present. The mucosa was otherwise normal.   There were no strictures or stenosis. GEJ junction traversed with endoscope without resistance.  The Z-line was irregular, appreciated at 30 cm from the incisors. The diaphragmatic impression was appreciated at 35 cm from the incisors. A 5 cm hiatal hernia was present.  Biopsies were obtained with cold forceps in the proximal and distal esophagus.   Stomach:    The gastric body, antrum, fundus, cardia, and angularis were normal. No ulcers, erosions or masses visualized. Endoscope was placed in a retroflexion view in the stomach. There was evidence of a hiatal hernia, Hill grade 3.   Duodenum:   In the 2nd portion of the duodenum, there was an elongated prominent fold vs submucosa lesion; entire examined duodenum was otherwise normal.  Biopsies with cold forceps were obtained of the fold vs submucosal lesion.   Recommendations:  Post EGD precautions, watch for bleeding,  infection, perforation, adverse drug reactions   Follow-up biopsies  Increase pantoprazole to 40mg bid  Avoid non-aspirin NSAID  Referral to Dr. Sparrow for hiatal hernia surgical evaluation  Consider EUS of duodenal lesion pending results of biopsies    Ramiro Celestin MD  6/13/2025  9:37 AM

## 2025-06-13 NOTE — DISCHARGE INSTRUCTIONS

## 2025-06-13 NOTE — ANESTHESIA PREPROCEDURE EVALUATION
PRE-OP EVALUATION    Patient Name: Leila Alarcon    Admit Diagnosis: Gastroesophageal reflux disease, unspecified whether esophagitis present [K21.9]    Pre-op Diagnosis: Gastroesophageal reflux disease, unspecified whether esophagitis present [K21.9]    ESOPHAGOGASTRODUODENOSCOPY (EGD)    Anesthesia Procedure: ESOPHAGOGASTRODUODENOSCOPY (EGD)    Surgeons and Role:     * Ramiro Celestin MD - Primary    Pre-op vitals reviewed.  Temp: 97.9 °F (36.6 °C)  Pulse: 70  Resp: 16  BP: 155/84  SpO2: 98 %  Body mass index is 33.66 kg/m².    Current medications reviewed.  Hospital Medications:  Current Medications[1]    Outpatient Medications:   Prescriptions Prior to Admission[2]    Allergies: Dust mite extract and Seasonal      Anesthesia Evaluation        Anesthetic Complications           GI/Hepatic/Renal      (+) GERD       (-) chronic renal disease   (-) liver disease                 Cardiovascular                (+) obesity  (-) hypertension     (-) CAD  (-) past MI  (-) CABG/stent  (-) pacemaker/AICD  (-) valvular problems/murmurs     (-) dysrhythmias   (-) CHF  (-) angina              Endo/Other      (-) diabetes                            Pulmonary  Comment: 47 year hx     (-) asthma  (-) COPD                   Neuro/Psych          (-) CVA     (+) seizures                       Past Surgical History[3]  Social Hx on file[4]  History   Drug Use Unknown     Available pre-op labs reviewed.               Airway      Mallampati: II  Mouth opening: 3 FB  TM distance: 4 - 6 cm  Neck ROM: full Cardiovascular    Cardiovascular exam normal.         Dental             Pulmonary    Pulmonary exam normal.                 Other findings              ASA: 3   Plan: MAC             Plan/risks discussed with: patient                Present on Admission:  **None**             [1]    lactated ringers infusion   Intravenous Continuous   [2]   Facility-Administered Medications Prior to Admission   Medication Dose Route Frequency  Provider Last Rate Last Admin    cyanocobalamin (Vitamin B12) 1000 MCG/ML injection 1,000 mcg  1,000 mcg Intramuscular Q30 Days Anh Rosado,    1,000 mcg at 03/10/25 1210     Medications Prior to Admission   Medication Sig Dispense Refill Last Dose/Taking    gabapentin 300 MG Oral Cap Take 2 capsules (600 mg total) by mouth in the morning, at noon, and at bedtime. 180 capsule 3 Past Week    levETIRAcetam 500 MG Oral Tab Take 1 tablet (500 mg total) by mouth 2 (two) times daily. 180 tablet 3 Past Week    SUMAtriptan 50 MG Oral Tab TAKE 1 TABLET BY MOUTH AT ONSET OF HEADACHE. MAY REPEAT ONCE AFTER 2 HOURS- TAKE MAXIMUM OF 2 TABLETS PER DAY 9 tablet 3 Past Week    meclizine 25 MG Oral Tab Take 1 tablet (25 mg total) by mouth 3 (three) times daily as needed for Dizziness. 30 tablet 3 Past Week    albuterol 108 (90 Base) MCG/ACT Inhalation Aero Soln Inhale 1 puff into the lungs every 6 (six) hours as needed. 18 g 0 Past Week    ALPRAZolam (XANAX) 0.5 MG Oral Tab Take 1 tablet (0.5 mg total) by mouth nightly as needed. 30 tablet 0 Past Week    escitalopram 10 MG Oral Tab Take 1 tablet (10 mg total) by mouth daily. 90 tablet 0 Past Week    pantoprazole 40 MG Oral Tab EC Take 1 tablet (40 mg total) by mouth 2 (two) times daily before meals. 180 tablet 3 Past Week    triamcinolone 0.1 % External Cream Use twice a day to affected area for 2 weeks then take a break for 2 weeks then apply again for 2 weeks 45 g 3 Past Month    [] Tirzepatide-Weight Management (ZEPBOUND) 5 MG/0.5ML Subcutaneous Solution Auto-injector Inject 5 mg into the skin once a week for 4 doses. (Patient not taking: Reported on 2025) 6 mL 0 Not Taking    Atogepant (QULIPTA) 60 MG Oral Tab Take 60 mg by mouth daily. (Patient taking differently: Take 60 mg by mouth as needed.) 30 tablet 5     methylPREDNISolone (MEDROL) 4 MG Oral Tablet Therapy Pack Dosepack: take as directed (Patient not taking: Reported on 2025) 1 each 0 Not  Taking    [] hydrOXYzine 25 MG Oral Tab Take 1-2 tablets (25-50 mg total) by mouth every 4 (four) hours as needed for Itching. (Patient not taking: Reported on 2025) 20 tablet 0 Not Taking   [3]   Past Surgical History:  Procedure Laterality Date    Bunionectomy Right     Cholecystectomy      Colectomy      Colon surgery      partial resection    Colonoscopy N/A 2024    Procedure: COLONOSCOPY;  Surgeon: Ramiro Celestin MD;  Location:  ENDOSCOPY    Egd      Hernia repair      Hernia surgery      Coral localization wire 1 site left (cpt=19281) Left     benign , several yrs ago          Other surgical history      Removal gallbladder      Repair of hammertoe,one Right     Wrist fracture surgery      plate and screws  left   [4]   Social History  Socioeconomic History    Marital status: Single   Tobacco Use    Smoking status: Every Day     Current packs/day: 0.50     Average packs/day: 0.5 packs/day for 44.4 years (22.2 ttl pk-yrs)     Types: Cigarettes     Start date: 1981    Smokeless tobacco: Never   Vaping Use    Vaping status: Never Used   Substance and Sexual Activity    Alcohol use: Not Currently    Drug use: Not Currently     Types: Cannabis, Cocaine   Other Topics Concern    Caffeine Concern No    Stress Concern No    Weight Concern Yes    Special Diet No    Exercise Yes    Seat Belt Yes

## 2025-06-13 NOTE — H&P
History & Physical Examination    Patient Name: Leila Alarcon  MRN: [id number]  CSN: [id number]  YOB: 1965    Diagnosis: LA C esophagitis    Present Illness: 60 y/o F history of esophagitis presents for EGD    Prescriptions Prior to Admission[1]  Current Hospital Medications[2]    Allergies: Allergies[3]    Past Medical History[4]  Past Surgical History[5]  Family History[6]  Social History     Tobacco Use    Smoking status: Every Day     Current packs/day: 0.50     Average packs/day: 0.5 packs/day for 44.4 years (22.2 ttl pk-yrs)     Types: Cigarettes     Start date: 1/1/1981    Smokeless tobacco: Never   Substance Use Topics    Alcohol use: Not Currently       SYSTEM Check if Review is Normal Check if Physical Exam is Normal If not normal, please explain:   HEENT [ x] [x ]    NECK & BACK [ x] [ x]    HEART [ x] [x ]    LUNGS [ x] [ x]    ABDOMEN [ x] [x ]    UROGENITAL [ n/a] [ n/a]    EXTREMITIES [ x] [x ]    OTHER        [ x ] I have discussed the risks and benefits and alternatives with the patient/family.  They understand and agree to proceed with plan of care.  [ x ] I have reviewed the History and Physical done within the last 30 days.  Any changes noted above.    Ramiro Celestin MD  6/13/2025  8:42 AM            [1]   Facility-Administered Medications Prior to Admission   Medication Dose Route Frequency Provider Last Rate Last Admin    cyanocobalamin (Vitamin B12) 1000 MCG/ML injection 1,000 mcg  1,000 mcg Intramuscular Q30 Days Anh Rosado, DO   1,000 mcg at 03/10/25 1210     Medications Prior to Admission   Medication Sig Dispense Refill Last Dose/Taking    gabapentin 300 MG Oral Cap Take 2 capsules (600 mg total) by mouth in the morning, at noon, and at bedtime. 180 capsule 3 Past Week    levETIRAcetam 500 MG Oral Tab Take 1 tablet (500 mg total) by mouth 2 (two) times daily. 180 tablet 3 Past Week    SUMAtriptan 50 MG Oral Tab TAKE 1 TABLET BY MOUTH AT ONSET OF HEADACHE. MAY  REPEAT ONCE AFTER 2 HOURS- TAKE MAXIMUM OF 2 TABLETS PER DAY 9 tablet 3 Past Week    meclizine 25 MG Oral Tab Take 1 tablet (25 mg total) by mouth 3 (three) times daily as needed for Dizziness. 30 tablet 3 Past Week    albuterol 108 (90 Base) MCG/ACT Inhalation Aero Soln Inhale 1 puff into the lungs every 6 (six) hours as needed. 18 g 0 Past Week    ALPRAZolam (XANAX) 0.5 MG Oral Tab Take 1 tablet (0.5 mg total) by mouth nightly as needed. 30 tablet 0 Past Week    escitalopram 10 MG Oral Tab Take 1 tablet (10 mg total) by mouth daily. 90 tablet 0 Past Week    pantoprazole 40 MG Oral Tab EC Take 1 tablet (40 mg total) by mouth 2 (two) times daily before meals. 180 tablet 3 Past Week    triamcinolone 0.1 % External Cream Use twice a day to affected area for 2 weeks then take a break for 2 weeks then apply again for 2 weeks 45 g 3 Past Month    [] Tirzepatide-Weight Management (ZEPBOUND) 5 MG/0.5ML Subcutaneous Solution Auto-injector Inject 5 mg into the skin once a week for 4 doses. (Patient not taking: Reported on 2025) 6 mL 0 Not Taking    Atogepant (QULIPTA) 60 MG Oral Tab Take 60 mg by mouth daily. (Patient taking differently: Take 60 mg by mouth as needed.) 30 tablet 5     methylPREDNISolone (MEDROL) 4 MG Oral Tablet Therapy Pack Dosepack: take as directed (Patient not taking: Reported on 2025) 1 each 0 Not Taking    [] hydrOXYzine 25 MG Oral Tab Take 1-2 tablets (25-50 mg total) by mouth every 4 (four) hours as needed for Itching. (Patient not taking: Reported on 2025) 20 tablet 0 Not Taking   [2]   Current Facility-Administered Medications   Medication Dose Route Frequency    lactated ringers infusion   Intravenous Continuous   [3]   Allergies  Allergen Reactions    Dust Mite Extract OTHER (SEE COMMENTS)    Seasonal OTHER (SEE COMMENTS)     Runny nose   [4]   Past Medical History:   Abdominal pain    Allergic rhinitis    Anxiety    Anxiety state    Back pain    Back problem     DEGENERATIVE DISC    Bloating    Body piercing    Cancer (HCC)    biopsy of breast and polyps    Constipation    Crohn disease (HCC)    Decorative tattoo    Depression    Disorder of thyroid    Diverticulosis of large intestine    Dizziness    Esophageal reflux    Heartburn    History of depression    Hypothyroidism    Don't know    Indigestion    Irregular bowel habits    Itch of skin    Migraines    Nausea and vomiting    Night sweats    Obesity    Pain with bowel movements    Seizure disorder (HCC)    Stool incontinence    Stress    Ulcerative colitis (HCC)    Vomiting    Wears glasses    Weight gain   [5]   Past Surgical History:  Procedure Laterality Date    Bunionectomy Right     Cholecystectomy      Colectomy      Colon surgery      partial resection    Colonoscopy N/A 2024    Procedure: COLONOSCOPY;  Surgeon: Ramiro Celestin MD;  Location:  ENDOSCOPY    Egd      Hernia repair      Hernia surgery      Coral localization wire 1 site left (cpt=19281) Left     benign , several yrs ago          Other surgical history      Removal gallbladder      Repair of hammertoe,one Right     Wrist fracture surgery      plate and screws  left   [6]   Family History  Problem Relation Age of Onset    Mental Disorder Mother     Diabetes Mother     Asthma Mother     Cancer Father         Father's side had different types of cancer    Diabetes Father         My aunt and uncle had cancer and passed away from it    Cancer Maternal Grandfather

## 2025-06-13 NOTE — ANESTHESIA POSTPROCEDURE EVALUATION
[facility]    Leila Alarcon Patient Status:  Hospital Outpatient Surgery   Age/Gender 59 year old female MRN [id number]   Location [facility] Attending Ramiro Celestin MD   Hosp Day # 0 PCP Anh Rosado DO       Anesthesia Post-op Note    ESOPHAGOGASTRODUODENOSCOPY (EGD) WITH BIOPSIES    Procedure Summary       Date: 06/13/25 Room / Location:  ENDOSCOPY 02 /  ENDOSCOPY    Anesthesia Start: 0924 Anesthesia Stop: 0942    Procedure: ESOPHAGOGASTRODUODENOSCOPY (EGD) WITH BIOPSIES Diagnosis:       Gastroesophageal reflux disease, unspecified whether esophagitis present      (esophagitis, hiatal hernia)    Surgeons: Ramiro Celestin MD Anesthesiologist: Donnell Dukcworth MD    Anesthesia Type: MAC ASA Status: 3            Anesthesia Type: MAC    Vitals Value Taken Time   /90 06/13/25 09:45   Temp 98 06/13/25 09:46   Pulse 80 06/13/25 09:46   Resp 12 06/13/25 09:46   SpO2 95 % 06/13/25 09:46   Vitals shown include unfiled device data.        Patient Location: Endoscopy    Anesthesia Type: MAC    Airway Patency: patent    Postop Pain Control: adequate    Mental Status: preanesthetic baseline    Nausea/Vomiting: none    Cardiopulmonary/Hydration status: stable euvolemic    Complications: no apparent anesthesia related complications    Postop vital signs: stable    Dental Exam: Unchanged from Preop    Patient to be discharged from PACU when criteria met.

## 2025-06-18 ENCOUNTER — LAB ENCOUNTER (OUTPATIENT)
Dept: LAB | Age: 60
End: 2025-06-18
Attending: INTERNAL MEDICINE
Payer: MEDICARE

## 2025-06-18 ENCOUNTER — OFFICE VISIT (OUTPATIENT)
Dept: INTERNAL MEDICINE CLINIC | Facility: CLINIC | Age: 60
End: 2025-06-18
Payer: MEDICARE

## 2025-06-18 VITALS
DIASTOLIC BLOOD PRESSURE: 88 MMHG | SYSTOLIC BLOOD PRESSURE: 138 MMHG | TEMPERATURE: 98 F | OXYGEN SATURATION: 98 % | WEIGHT: 190 LBS | RESPIRATION RATE: 16 BRPM | HEART RATE: 81 BPM | BODY MASS INDEX: 33.66 KG/M2 | HEIGHT: 63 IN

## 2025-06-18 DIAGNOSIS — G43.711 INTRACTABLE CHRONIC MIGRAINE WITHOUT AURA AND WITH STATUS MIGRAINOSUS: ICD-10-CM

## 2025-06-18 DIAGNOSIS — E66.811 CLASS 1 OBESITY WITHOUT SERIOUS COMORBIDITY WITH BODY MASS INDEX (BMI) OF 30.0 TO 30.9 IN ADULT, UNSPECIFIED OBESITY TYPE: ICD-10-CM

## 2025-06-18 DIAGNOSIS — K21.9 GASTROESOPHAGEAL REFLUX DISEASE, UNSPECIFIED WHETHER ESOPHAGITIS PRESENT: ICD-10-CM

## 2025-06-18 DIAGNOSIS — E53.8 VITAMIN B12 DEFICIENCY: ICD-10-CM

## 2025-06-18 DIAGNOSIS — Z00.00 ROUTINE PHYSICAL EXAMINATION: Primary | ICD-10-CM

## 2025-06-18 DIAGNOSIS — I10 PRIMARY HYPERTENSION: ICD-10-CM

## 2025-06-18 DIAGNOSIS — G40.309 NONINTRACTABLE GENERALIZED IDIOPATHIC EPILEPSY WITHOUT STATUS EPILEPTICUS (HCC): ICD-10-CM

## 2025-06-18 DIAGNOSIS — R73.03 PREDIABETES: ICD-10-CM

## 2025-06-18 DIAGNOSIS — M54.12 CERVICAL RADICULOPATHY: ICD-10-CM

## 2025-06-18 DIAGNOSIS — Z12.4 SCREENING FOR CERVICAL CANCER: ICD-10-CM

## 2025-06-18 DIAGNOSIS — Z00.00 ROUTINE PHYSICAL EXAMINATION: ICD-10-CM

## 2025-06-18 DIAGNOSIS — F41.9 ANXIETY: ICD-10-CM

## 2025-06-18 DIAGNOSIS — F33.1 MODERATE EPISODE OF RECURRENT MAJOR DEPRESSIVE DISORDER (HCC): ICD-10-CM

## 2025-06-18 LAB
ALT SERPL-CCNC: 11 U/L (ref 10–49)
ANION GAP SERPL CALC-SCNC: 12 MMOL/L (ref 0–18)
AST SERPL-CCNC: 15 U/L (ref ?–34)
BASOPHILS # BLD AUTO: 0.03 X10(3) UL (ref 0–0.2)
BASOPHILS NFR BLD AUTO: 0.4 %
BUN BLD-MCNC: 7 MG/DL (ref 9–23)
CALCIUM BLD-MCNC: 9.4 MG/DL (ref 8.7–10.6)
CHLORIDE SERPL-SCNC: 105 MMOL/L (ref 98–112)
CHOLEST SERPL-MCNC: 269 MG/DL (ref ?–200)
CO2 SERPL-SCNC: 24 MMOL/L (ref 21–32)
CREAT BLD-MCNC: 1.07 MG/DL (ref 0.55–1.02)
EGFRCR SERPLBLD CKD-EPI 2021: 61 ML/MIN/1.73M2 (ref 60–?)
EOSINOPHIL # BLD AUTO: 0.15 X10(3) UL (ref 0–0.7)
EOSINOPHIL NFR BLD AUTO: 2 %
ERYTHROCYTE [DISTWIDTH] IN BLOOD BY AUTOMATED COUNT: 13.2 %
EST. AVERAGE GLUCOSE BLD GHB EST-MCNC: 120 MG/DL (ref 68–126)
FASTING PATIENT LIPID ANSWER: YES
FASTING STATUS PATIENT QL REPORTED: YES
GLUCOSE BLD-MCNC: 88 MG/DL (ref 70–99)
HBA1C MFR BLD: 5.8 % (ref ?–5.7)
HCT VFR BLD AUTO: 44.9 % (ref 35–48)
HDLC SERPL-MCNC: 42 MG/DL (ref 40–59)
HGB BLD-MCNC: 14.6 G/DL (ref 12–16)
IMM GRANULOCYTES # BLD AUTO: 0.02 X10(3) UL (ref 0–1)
IMM GRANULOCYTES NFR BLD: 0.3 %
LDLC SERPL CALC-MCNC: 180 MG/DL (ref ?–100)
LYMPHOCYTES # BLD AUTO: 2.2 X10(3) UL (ref 1–4)
LYMPHOCYTES NFR BLD AUTO: 29 %
MCH RBC QN AUTO: 29.3 PG (ref 26–34)
MCHC RBC AUTO-ENTMCNC: 32.5 G/DL (ref 31–37)
MCV RBC AUTO: 90 FL (ref 80–100)
MONOCYTES # BLD AUTO: 0.54 X10(3) UL (ref 0.1–1)
MONOCYTES NFR BLD AUTO: 7.1 %
NEUTROPHILS # BLD AUTO: 4.65 X10 (3) UL (ref 1.5–7.7)
NEUTROPHILS # BLD AUTO: 4.65 X10(3) UL (ref 1.5–7.7)
NEUTROPHILS NFR BLD AUTO: 61.2 %
NONHDLC SERPL-MCNC: 227 MG/DL (ref ?–130)
OSMOLALITY SERPL CALC.SUM OF ELEC: 289 MOSM/KG (ref 275–295)
PLATELET # BLD AUTO: 342 10(3)UL (ref 150–450)
POTASSIUM SERPL-SCNC: 3.7 MMOL/L (ref 3.5–5.1)
RBC # BLD AUTO: 4.99 X10(6)UL (ref 3.8–5.3)
SODIUM SERPL-SCNC: 141 MMOL/L (ref 136–145)
TRIGL SERPL-MCNC: 244 MG/DL (ref 30–149)
TSI SER-ACNC: 1.8 UIU/ML (ref 0.55–4.78)
VIT B12 SERPL-MCNC: 306 PG/ML (ref 211–911)
VLDLC SERPL CALC-MCNC: 51 MG/DL (ref 0–30)
WBC # BLD AUTO: 7.6 X10(3) UL (ref 4–11)

## 2025-06-18 PROCEDURE — 84460 ALANINE AMINO (ALT) (SGPT): CPT

## 2025-06-18 PROCEDURE — 82607 VITAMIN B-12: CPT

## 2025-06-18 PROCEDURE — G0439 PPPS, SUBSEQ VISIT: HCPCS | Performed by: INTERNAL MEDICINE

## 2025-06-18 PROCEDURE — 3079F DIAST BP 80-89 MM HG: CPT | Performed by: INTERNAL MEDICINE

## 2025-06-18 PROCEDURE — 84443 ASSAY THYROID STIM HORMONE: CPT

## 2025-06-18 PROCEDURE — 96160 PT-FOCUSED HLTH RISK ASSMT: CPT | Performed by: INTERNAL MEDICINE

## 2025-06-18 PROCEDURE — 80061 LIPID PANEL: CPT

## 2025-06-18 PROCEDURE — 84450 TRANSFERASE (AST) (SGOT): CPT

## 2025-06-18 PROCEDURE — 85025 COMPLETE CBC W/AUTO DIFF WBC: CPT

## 2025-06-18 PROCEDURE — 3008F BODY MASS INDEX DOCD: CPT | Performed by: INTERNAL MEDICINE

## 2025-06-18 PROCEDURE — 36415 COLL VENOUS BLD VENIPUNCTURE: CPT

## 2025-06-18 PROCEDURE — 3075F SYST BP GE 130 - 139MM HG: CPT | Performed by: INTERNAL MEDICINE

## 2025-06-18 PROCEDURE — 83036 HEMOGLOBIN GLYCOSYLATED A1C: CPT

## 2025-06-18 PROCEDURE — 80048 BASIC METABOLIC PNL TOTAL CA: CPT

## 2025-06-18 RX ORDER — TIRZEPATIDE 2.5 MG/.5ML
2.5 INJECTION, SOLUTION SUBCUTANEOUS WEEKLY
Qty: 2 ML | Refills: 0 | Status: SHIPPED | OUTPATIENT
Start: 2025-06-18 | End: 2025-07-10

## 2025-06-18 RX ORDER — PANTOPRAZOLE SODIUM 40 MG/1
40 TABLET, DELAYED RELEASE ORAL
Qty: 180 TABLET | Refills: 3 | Status: SHIPPED | OUTPATIENT
Start: 2025-06-18

## 2025-06-18 RX ORDER — ALPRAZOLAM 0.5 MG
0.5 TABLET ORAL NIGHTLY PRN
Qty: 30 TABLET | Refills: 0 | Status: SHIPPED | OUTPATIENT
Start: 2025-06-18

## 2025-06-18 RX ORDER — SUMATRIPTAN 50 MG/1
TABLET, FILM COATED ORAL
Qty: 9 TABLET | Refills: 3 | Status: CANCELLED | OUTPATIENT
Start: 2025-06-18

## 2025-06-18 NOTE — PROGRESS NOTES
Patient Office Visit    ASSESSMENT AND PLAN:   1. Routine physical examination  Note: Continue to exercise at least 150 minutes a week and Eat a plant based diet. Please take 2000 IU of vitamin D daily for life to keep your bones strong. Please see your dentist every 6 months.  Continue with regular eye exams.  Highly recommended the pneumonia and the shingles vaccine through the pharmacy  - ALT(SGPT); Future  - AST (SGOT); Future  - Basic Metabolic Panel (8); Future  - Lipid Panel; Future  - CBC With Differential With Platelet; Future  - TSH W Reflex To Free T4; Future  - Hemoglobin A1C [E]; Future    2. Anxiety  Note: Continue citalopram and Xanax as needed  - ALPRAZolam (XANAX) 0.5 MG Oral Tab; Take 1 tablet (0.5 mg total) by mouth nightly as needed.  Dispense: 30 tablet; Refill: 0  - TSH W Reflex To Free T4; Future    3. Cervical radiculopathy  Note: Continue gabapentin    4. Class 1 obesity without serious comorbidity with body mass index (BMI) of 30.0 to 30.9 in adult, unspecified obesity type  Note: Patient will see if she can afford the Zepbound.  Continue with diet and lifestyle modifications  - ALT(SGPT); Future  - AST (SGOT); Future  - Lipid Panel; Future  - TSH W Reflex To Free T4; Future    5. Gastroesophageal reflux disease, unspecified whether esophagitis present  Note: Pantoprazole was recently increased to twice a day and she will be following up with the surgeon to discuss about the hiatal hernia  - CBC With Differential With Platelet; Future    6. Intractable chronic migraine without aura and with status migrainosus  Note: Continue follow-up with the neurologist and continue current management by him    7. Moderate episode of recurrent major depressive disorder (HCC)  Note: Continue citalopram    8. Prediabetes  Note: Continue with diet and lifestyle modifications  - Hemoglobin A1C [E]; Future    9. Nonintractable generalized idiopathic epilepsy without status epilepticus (HCC)  Note: On Keppra  per neurology    10. Primary hypertension  Note: Stable off medicine and patient continues to monitor it    11. Vitamin B12 deficiency  - Vitamin B12 [E]; Future    12. Screening for cervical cancer  - ThinPrep PAP with HPV Reflex Request B; Future    Return to clinic in 3 months      Patient/Caregiver Education: Patient/Caregiver Education: There are no barriers to learning. Medical education done. Outcome: Patient verbalizes understanding. Patient is notified to call with any questions, complications, allergies, or worsening or changing symptoms.  Patient is to call with any side effects or complications from the treatments as a result of today.      Reviewed Past Medical History and   Problem List[1]    Orders Placed This Encounter   Procedures    ALT(SGPT)     Standing Status:   Future     Expected Date:   6/18/2025     Expiration Date:   6/18/2026    AST (SGOT)     Standing Status:   Future     Expected Date:   6/18/2025     Expiration Date:   6/18/2026    Basic Metabolic Panel (8)     Standing Status:   Future     Expected Date:   6/18/2025     Expiration Date:   6/18/2026    Lipid Panel     Standing Status:   Future     Expected Date:   6/18/2025     Expiration Date:   6/18/2026    CBC With Differential With Platelet     Standing Status:   Future     Expected Date:   6/18/2025     Expiration Date:   6/18/2026    TSH W Reflex To Free T4     Standing Status:   Future     Expected Date:   6/18/2025     Expiration Date:   6/18/2026    Hemoglobin A1C [E]     Standing Status:   Future     Expected Date:   6/18/2025     Expiration Date:   6/18/2026     Release to patient:   Immediate    Vitamin B12 [E]     Standing Status:   Future     Expected Date:   6/18/2025     Expiration Date:   6/18/2026     Release to patient:   Immediate     Requested Prescriptions     Signed Prescriptions Disp Refills    ALPRAZolam (XANAX) 0.5 MG Oral Tab 30 tablet 0     Sig: Take 1 tablet (0.5 mg total) by mouth nightly as needed.     pantoprazole 40 MG Oral Tab  tablet 3     Sig: Take 1 tablet (40 mg total) by mouth 2 (two) times daily before meals.    Tirzepatide-Weight Management (ZEPBOUND) 2.5 MG/0.5ML Subcutaneous Solution Auto-injector 2 mL 0     Sig: Inject 2.5 mg into the skin once a week for 4 doses.         Anh Rosado,   CC:  Chief Complaint   Patient presents with    Annual     MAW         HPI:   Leila Alarcon is a 59-year-old female who presents for a routine physical    GERD: Was noted to have hiatal hernia and was recommended to increase the pantoprazole to twice a day.  She was also advised to see the surgeon  Depression: Has been controlled and she has been happy as her son is living with her now  Prediabetes/obesity: She did not realize that the Zepbound was approved and she will see how much the copayment is  Migraines: She was not taking her medicines regularly as she was concerned that it had a lot of side effects but noticed that the migraines were getting worse.  She will restart them  Cervical radiculopathy: Stable    Past Medical History[2]    Past Surgical History[3]    Social History:  Short Social Hx on File[4]  Family History:  Family History[5]  Allergies:  Allergies[6]  Current Meds:  Medications Ordered Prior to Encounter[7]      REVIEW OF SYSTEMS   Constitutional: no fatigue normal energy no weight changes   HENT: normal sinuses and no mouth issues   Eyes: . normal vision no eye pain   Respiratory: normal respirations no cough   Cardiovascular: no CP, or palpitations   Gastrointestinal: normal bowels and no abd pains   Genitourinary:  normal urination no hematuria, no frequency   Musculoskeletal: no pains in arms/legs, normal range of motion   Skin: no rashes or skin lesions that are new   Neurological:  no weakness, no numbness, normal gait   Hematological:  no bruises or bleeding   Psychiatric/Behavioral: normal mood no anxiety normal behavior     /88   Pulse 81   Temp 98.3 °F  (36.8 °C) (Temporal)   Resp 16   Ht 5' 3\" (1.6 m)   Wt 190 lb (86.2 kg)   LMP  (LMP Unknown)   SpO2 98%   BMI 33.66 kg/m²     PHYSICAL EXAM:   Constitutional: Vital signs reviewed as noted, well developed, in no acute distress.   HENT: NCAT, bilateral ear canal and tympanic membrane appear normal  Eyes: pupils reactive bilaterally  Neck: No thyroidmegaly  Cardiovascular: nl s1 s2 no m/r/g  Pulmonary/Chest: CTA bilaterally with no wheezes  Abdominal: Soft NT normal Bowel sounds  : normal external genitalia without skin lesions or rashes, normal cervix, no lesions, no abnormal discharge  Extremities: no pedal edema   Neurological:  no weakness in UE and LE, reflexes are normal  Skin: no rashes or bruises on visualized skin, not undressed   Psychiatric:normal mood     MA: Tabitha Worthy is present in the room          [1]   Patient Active Problem List  Diagnosis    Cognitive deficits    Anxiety    Raynaud's phenomenon without gangrene    Excessive sleepiness    Intractable chronic migraine without aura and with status migrainosus    Class 1 obesity without serious comorbidity with body mass index (BMI) of 30.0 to 30.9 in adult    Gastroesophageal reflux disease    Primary hypertension    Cervical radiculopathy    Vitamin B12 deficiency    Prediabetes    Female stress incontinence    Nonintractable generalized idiopathic epilepsy without status epilepticus (HCC)    Irritable bowel syndrome with constipation    Moderate episode of recurrent major depressive disorder (HCC)   [2]   Past Medical History:   Abdominal pain    Allergic rhinitis    Anxiety    Anxiety state    Back pain    Back problem    DEGENERATIVE DISC    Bloating    Body piercing    Cancer (HCC)    biopsy of breast and polyps    Constipation    Crohn disease (HCC)    Decorative tattoo    Depression    Disorder of thyroid    Diverticulosis of large intestine    Dizziness    Esophageal reflux    Heartburn    History of depression    Hypothyroidism     Don't know    Indigestion    Irregular bowel habits    Itch of skin    Migraines    Nausea and vomiting    Night sweats    Obesity    Pain with bowel movements    Seizure disorder (HCC)    Stool incontinence    Stress    Ulcerative colitis (HCC)    Vomiting    Wears glasses    Weight gain   [3]   Past Surgical History:  Procedure Laterality Date    Bunionectomy Right     Cholecystectomy      Colectomy      Colon surgery      partial resection    Colonoscopy N/A 2024    Procedure: COLONOSCOPY;  Surgeon: Ramiro Celestin MD;  Location:  ENDOSCOPY    Egd      Hernia repair      Hernia surgery      Coral localization wire 1 site left (cpt=19281) Left     benign , several yrs ago          Other surgical history      Removal gallbladder      Repair of hammertoe,one Right     Wrist fracture surgery      plate and screws  left   [4]   Social History  Socioeconomic History    Marital status: Single   Tobacco Use    Smoking status: Every Day     Current packs/day: 0.50     Average packs/day: 0.5 packs/day for 44.5 years (22.2 ttl pk-yrs)     Types: Cigarettes     Start date: 1981    Smokeless tobacco: Never   Vaping Use    Vaping status: Never Used   Substance and Sexual Activity    Alcohol use: Not Currently    Drug use: Not Currently     Types: Cannabis, Cocaine   Other Topics Concern    Caffeine Concern No    Stress Concern No    Weight Concern Yes    Special Diet No    Exercise Yes    Seat Belt Yes     Social Drivers of Health     Food Insecurity: No Food Insecurity (2025)    NCSS - Food Insecurity     Worried About Running Out of Food in the Last Year: No     Ran Out of Food in the Last Year: No   Transportation Needs: No Transportation Needs (2025)    NCSS - Transportation     Lack of Transportation: No   Housing Stability: Not At Risk (2025)    NCSS - Housing/Utilities     Has Housing: Yes     Worried About Losing Housing: No     Unable to Get Utilities: No   [5]   Family  History  Problem Relation Age of Onset    Mental Disorder Mother     Diabetes Mother     Asthma Mother     Cancer Father         Father's side had different types of cancer    Diabetes Father         My aunt and uncle had cancer and passed away from it    Cancer Maternal Grandfather    [6]   Allergies  Allergen Reactions    Dust Mite Extract OTHER (SEE COMMENTS)    Seasonal OTHER (SEE COMMENTS)     Runny nose   [7]   Current Outpatient Medications on File Prior to Visit   Medication Sig Dispense Refill    Atogepant (QULIPTA) 60 MG Oral Tab Take 60 mg by mouth daily. (Patient taking differently: Take 60 mg by mouth as needed.) 30 tablet 5    levETIRAcetam 500 MG Oral Tab Take 1 tablet (500 mg total) by mouth 2 (two) times daily. 180 tablet 3    SUMAtriptan 50 MG Oral Tab TAKE 1 TABLET BY MOUTH AT ONSET OF HEADACHE. MAY REPEAT ONCE AFTER 2 HOURS- TAKE MAXIMUM OF 2 TABLETS PER DAY 9 tablet 3    meclizine 25 MG Oral Tab Take 1 tablet (25 mg total) by mouth 3 (three) times daily as needed for Dizziness. 30 tablet 3    albuterol 108 (90 Base) MCG/ACT Inhalation Aero Soln Inhale 1 puff into the lungs every 6 (six) hours as needed. 18 g 0    escitalopram 10 MG Oral Tab Take 1 tablet (10 mg total) by mouth daily. 90 tablet 0    triamcinolone 0.1 % External Cream Use twice a day to affected area for 2 weeks then take a break for 2 weeks then apply again for 2 weeks 45 g 3    gabapentin 300 MG Oral Cap Take 2 capsules (600 mg total) by mouth in the morning, at noon, and at bedtime. 180 capsule 3     Current Facility-Administered Medications on File Prior to Visit   Medication Dose Route Frequency Provider Last Rate Last Admin    cyanocobalamin (Vitamin B12) 1000 MCG/ML injection 1,000 mcg  1,000 mcg Intramuscular Q30 Days Anh Rosado DO   1,000 mcg at 03/10/25 1210

## 2025-06-18 NOTE — PATIENT INSTRUCTIONS
Continue to exercise at least 150 minutes a week and Eat a plant based diet     Please take 2000 IU of vitamin D daily for life to keep your bones strong    Please see your dentist every 6 months    Continue with regular eye exam    Lets see if Zepbound is reasonably priced    Continue medications as recommended by your specialists    Please see Dr. Zapata from surgery in regards to the hiatal hernia    We did your Pap today and it may take a week for the results to come back    I highly recommend the pneumonia and the shingles vaccine and you can get it through the pharmacy    Continue to cut down on the smoking    See me back in 3 months or sooner as needed

## 2025-06-19 NOTE — PROGRESS NOTES
Dear RN, please let the patient know   Jim Dee   Prediabetes remains stable.  Continue with a high-fiber diet and avoid carbohydrates such as white rice/bread/pasta especially during dinner   Kidney function is very minimally declined.  Make sure that you are keeping well-hydrated and avoid ibuprofen/Aleve/Advil on a regular basis  Your cholesterol is very high.  This puts you at an increased risk for heart disease and stroke and I would like to start you on a cholesterol-lowering medicine called statin.  It is overall well-tolerated but it may cause muscle aches and pains.  Let me know your thoughts of starting this medicine  Vitamin B12 is in the lower end of normal and continue with your B12 shots  Blood count is normal  Liver function and thyroid function are normal  Please let me know if you have any questions  Anh Rosado DO

## 2025-06-23 ENCOUNTER — TELEPHONE (OUTPATIENT)
Dept: INTERNAL MEDICINE CLINIC | Facility: CLINIC | Age: 60
End: 2025-06-23

## 2025-06-23 DIAGNOSIS — E78.2 MIXED HYPERLIPIDEMIA: Primary | ICD-10-CM

## 2025-06-23 DIAGNOSIS — R30.0 DYSURIA: ICD-10-CM

## 2025-06-23 NOTE — TELEPHONE ENCOUNTER
Spoke with patient to relay results and recommendations per PCP.    SV: Pt is willing to start statin. Pt also brought up that she is experiencing right sided lower back pain and is urinating more. Wondering if PCP has any recommendations.

## 2025-06-24 RX ORDER — ATORVASTATIN CALCIUM 10 MG/1
10 TABLET, FILM COATED ORAL NIGHTLY
Qty: 90 TABLET | Refills: 0 | Status: SHIPPED | OUTPATIENT
Start: 2025-06-24

## 2025-06-24 NOTE — TELEPHONE ENCOUNTER
I have ordered statin and we will repeat blood tests in 3 months. Lets check a urine test to make sure no infection   Anh Rosado DO

## 2025-06-25 DIAGNOSIS — R87.612 LOW GRADE SQUAMOUS INTRAEPITHELIAL LESION ON CYTOLOGIC SMEAR OF CERVIX (LGSIL): Primary | ICD-10-CM

## 2025-06-26 ENCOUNTER — APPOINTMENT (OUTPATIENT)
Dept: CT IMAGING | Age: 60
End: 2025-06-26
Attending: PHYSICIAN ASSISTANT
Payer: MEDICARE

## 2025-06-26 ENCOUNTER — HOSPITAL ENCOUNTER (OUTPATIENT)
Age: 60
Discharge: HOME | End: 2025-06-26
Payer: MEDICARE

## 2025-06-26 VITALS
WEIGHT: 197 LBS | SYSTOLIC BLOOD PRESSURE: 132 MMHG | OXYGEN SATURATION: 97 % | HEART RATE: 55 BPM | BODY MASS INDEX: 35 KG/M2 | TEMPERATURE: 98 F | DIASTOLIC BLOOD PRESSURE: 77 MMHG | RESPIRATION RATE: 16 BRPM

## 2025-06-26 DIAGNOSIS — E87.6 HYPOKALEMIA: Primary | ICD-10-CM

## 2025-06-26 DIAGNOSIS — R42 DIZZINESS: ICD-10-CM

## 2025-06-26 DIAGNOSIS — E86.0 DEHYDRATION: ICD-10-CM

## 2025-06-26 LAB
#MXD IC: 0.7 X10ˆ3/UL (ref 0.1–1)
ATRIAL RATE: 61 BPM
BUN BLD-MCNC: 21 MG/DL (ref 7–18)
CHLORIDE BLD-SCNC: 102 MMOL/L (ref 98–112)
CO2 BLD-SCNC: 24 MMOL/L (ref 21–32)
CREAT BLD-MCNC: 1.1 MG/DL (ref 0.55–1.02)
EGFRCR SERPLBLD CKD-EPI 2021: 59 ML/MIN/1.73M2 (ref 60–?)
GLUCOSE BLD-MCNC: 121 MG/DL (ref 70–99)
HCT VFR BLD AUTO: 42.4 % (ref 35–48)
HCT VFR BLD CALC: 38 % (ref 34–50)
HGB BLD-MCNC: 14 G/DL (ref 12–16)
ISTAT IONIZED CALCIUM FOR CHEM 8: 1.3 MMOL/L (ref 1.12–1.32)
LYMPHOCYTES # BLD AUTO: 1.9 X10ˆ3/UL (ref 1–4)
LYMPHOCYTES NFR BLD AUTO: 22.7 %
MCH RBC QN AUTO: 29.4 PG (ref 26–34)
MCHC RBC AUTO-ENTMCNC: 33 G/DL (ref 31–37)
MCV RBC AUTO: 89.1 FL (ref 80–100)
MIXED CELL %: 8.6 %
NEUTROPHILS # BLD AUTO: 5.8 X10ˆ3/UL (ref 1.5–7.7)
NEUTROPHILS NFR BLD AUTO: 68.7 %
P AXIS: 42 DEGREES
P-R INTERVAL: 154 MS
PLATELET # BLD AUTO: 304 X10ˆ3/UL (ref 150–450)
POTASSIUM BLD-SCNC: 3 MMOL/L (ref 3.6–5.1)
Q-T INTERVAL: 450 MS
QRS DURATION: 84 MS
QTC CALCULATION (BEZET): 453 MS
R AXIS: 28 DEGREES
RBC # BLD AUTO: 4.76 X10ˆ6/UL (ref 3.8–5.3)
SODIUM BLD-SCNC: 141 MMOL/L (ref 136–145)
T AXIS: 36 DEGREES
TROPONIN I BLD-MCNC: <0.02 NG/ML (ref ?–0.05)
VENTRICULAR RATE: 61 BPM
WBC # BLD AUTO: 8.4 X10ˆ3/UL (ref 4–11)

## 2025-06-26 PROCEDURE — 84484 ASSAY OF TROPONIN QUANT: CPT

## 2025-06-26 PROCEDURE — 85025 COMPLETE CBC W/AUTO DIFF WBC: CPT | Performed by: PHYSICIAN ASSISTANT

## 2025-06-26 PROCEDURE — 99215 OFFICE O/P EST HI 40 MIN: CPT

## 2025-06-26 PROCEDURE — 70450 CT HEAD/BRAIN W/O DYE: CPT | Performed by: PHYSICIAN ASSISTANT

## 2025-06-26 PROCEDURE — 96360 HYDRATION IV INFUSION INIT: CPT

## 2025-06-26 PROCEDURE — 93005 ELECTROCARDIOGRAM TRACING: CPT

## 2025-06-26 PROCEDURE — 93010 ELECTROCARDIOGRAM REPORT: CPT

## 2025-06-26 PROCEDURE — 99214 OFFICE O/P EST MOD 30 MIN: CPT

## 2025-06-26 PROCEDURE — 80047 BASIC METABLC PNL IONIZED CA: CPT

## 2025-06-26 RX ORDER — SODIUM CHLORIDE 9 MG/ML
1000 INJECTION, SOLUTION INTRAVENOUS ONCE
Status: COMPLETED | OUTPATIENT
Start: 2025-06-26 | End: 2025-06-26

## 2025-06-26 RX ORDER — POTASSIUM CHLORIDE 1500 MG/1
20 TABLET, EXTENDED RELEASE ORAL ONCE
Status: COMPLETED | OUTPATIENT
Start: 2025-06-26 | End: 2025-06-26

## 2025-06-26 NOTE — ED INITIAL ASSESSMENT (HPI)
Pt c/o dizziness starting yesterday, denies nausea, pt has sinus pressure, cough, and sore throat

## 2025-06-26 NOTE — ED PROVIDER NOTES
Patient Seen in: [facility]        History  No chief complaint on file.    Stated Complaint: dizziness    Subjective:   HPI          Leila Alarcon is a 59 year old female, with past medical history as indicated below, presents with headache to frontal region x 1 days.  Localized in nature with associated dizziness and lightheadedness.  Headache is described as tightness without photophobia, diplopia, ear pain, tinnitus, nausea, vomiting,  sob, cough, rhinorrhea, recent illness, trauma, trauma, loss of consciousness, syncope, neck pain/ rigidity, fevers, chills, lethargy, recent illness, blurred vision.   Pain 4/10 and is constant in duration.  No alleviating/ aggravating factors. No home remedies tried prior to arrival.         Objective:     Past Medical History:    Abdominal pain    Allergic rhinitis    Anxiety    Anxiety state    Back pain    Back problem    DEGENERATIVE DISC    Bloating    Body piercing    Cancer (HCC)    biopsy of breast and polyps    Constipation    Crohn disease (HCC)    Decorative tattoo    Depression    Disorder of thyroid    Diverticulosis of large intestine    Dizziness    Esophageal reflux    Heartburn    History of depression    Hypothyroidism    Don't know    Indigestion    Irregular bowel habits    Itch of skin    Migraines    Nausea and vomiting    Night sweats    Obesity    Pain with bowel movements    Seizure disorder (HCC)    Stool incontinence    Stress    Ulcerative colitis (HCC)    Vomiting    Wears glasses    Weight gain              Past Surgical History:   Procedure Laterality Date    Bunionectomy Right     Cholecystectomy      Colectomy      Colon surgery      partial resection    Colonoscopy N/A 2024    Procedure: COLONOSCOPY;  Surgeon: Ramiro Celestin MD;  Location:  ENDOSCOPY    Egd      Hernia repair      Hernia surgery      Coral localization wire 1 site left (cpt=19281) Left     benign , several yrs ago          Other surgical  history      Removal gallbladder      Repair of hammertoe,one Right 2023    Wrist fracture surgery      plate and screws  left                Social History     Socioeconomic History    Marital status: Single   Tobacco Use    Smoking status: Every Day     Current packs/day: 0.50     Average packs/day: 0.5 packs/day for 44.5 years (22.2 ttl pk-yrs)     Types: Cigarettes     Start date: 1/1/1981    Smokeless tobacco: Never   Vaping Use    Vaping status: Never Used   Substance and Sexual Activity    Alcohol use: Not Currently    Drug use: Not Currently     Types: Cannabis, Cocaine   Other Topics Concern    Caffeine Concern No    Stress Concern No    Weight Concern Yes    Special Diet No    Exercise Yes    Seat Belt Yes     Social Drivers of Health     Food Insecurity: No Food Insecurity (6/18/2025)    NCSS - Food Insecurity     Worried About Running Out of Food in the Last Year: No     Ran Out of Food in the Last Year: No   Transportation Needs: No Transportation Needs (6/18/2025)    NCSS - Transportation     Lack of Transportation: No   Housing Stability: Not At Risk (6/18/2025)    NCSS - Housing/Utilities     Has Housing: Yes     Worried About Losing Housing: No     Unable to Get Utilities: No              Review of Systems   All other systems reviewed and are negative.      Positive for stated complaint: dizziness  Other systems are as noted in HPI.  Constitutional and vital signs reviewed.      All other systems reviewed and negative except as noted above.                  Physical Exam    ED Triage Vitals [06/26/25 1417]   /83   Pulse 60   Resp 16   Temp 98 °F (36.7 °C)   Temp src Oral   SpO2 97 %   O2 Device None (Room air)       Current Vitals:   Vital Signs  BP: 132/77  Pulse: 55  Resp: 16  Temp: 98 °F (36.7 °C)  Temp src: Oral    Oxygen Therapy  SpO2: 97 %  O2 Device: None (Room air)            Physical Exam  Vitals and nursing note reviewed.   Constitutional:       General: She is not in acute  distress.     Appearance: Normal appearance. She is normal weight. She is not ill-appearing, toxic-appearing or diaphoretic.   HENT:      Head: Normocephalic and atraumatic.      Right Ear: Tympanic membrane, ear canal and external ear normal.      Left Ear: Tympanic membrane, ear canal and external ear normal.      Nose: Congestion present. No rhinorrhea.      Mouth/Throat:      Mouth: Mucous membranes are moist.      Pharynx: Oropharynx is clear. No oropharyngeal exudate or posterior oropharyngeal erythema.   Eyes:      Extraocular Movements: Extraocular movements intact.      Conjunctiva/sclera: Conjunctivae normal.      Pupils: Pupils are equal, round, and reactive to light.   Pulmonary:      Effort: Pulmonary effort is normal. No respiratory distress.      Breath sounds: No stridor. No wheezing, rhonchi or rales.   Chest:      Chest wall: No tenderness.   Musculoskeletal:         General: No swelling, tenderness, deformity or signs of injury. Normal range of motion.      Cervical back: Normal range of motion and neck supple.   Skin:     General: Skin is warm and dry.      Capillary Refill: Capillary refill takes less than 2 seconds.      Coloration: Skin is not jaundiced or pale.      Findings: No bruising, erythema, lesion or rash.   Neurological:      General: No focal deficit present.      Mental Status: She is alert and oriented to person, place, and time. Mental status is at baseline.   Psychiatric:         Mood and Affect: Mood normal.         Behavior: Behavior normal.                 ED Course  Labs Reviewed   POCT ISTAT CHEM8 CARTRIDGE - Abnormal; Notable for the following components:       Result Value    ISTAT BUN 21 (*)     ISTAT Potassium 3.0 (*)     ISTAT Glucose 121 (*)     ISTAT Creatinine 1.10 (*)     eGFR-Cr 59 (*)     All other components within normal limits   ISTAT TROPONIN - Normal   POCT CBC     Results for orders placed or performed during the hospital encounter of 06/26/25   EKG     Collection Time: 06/26/25  2:47 PM   Result Value Ref Range    Ventricular rate 61 BPM    Atrial rate 61 BPM    P-R Interval 154 ms    QRS Duration 84 ms    Q-T Interval 450 ms    QTC Calculation (Bezet) 453 ms    P Axis 42 degrees    R Axis 28 degrees    T Axis 36 degrees   POCT CBC    Collection Time: 06/26/25  3:19 PM   Result Value Ref Range    WBC IC 8.4 4.0 - 11.0 x10ˆ3/uL    RBC IC 4.76 3.80 - 5.30 X10ˆ6/uL    HGB IC 14.0 12.0 - 16.0 g/dL    HCT IC 42.4 35.0 - 48.0 %    MCV IC 89.1 80.0 - 100.0 fL    MCH IC 29.4 26.0 - 34.0 pg    MCHC IC 33.0 31.0 - 37.0 g/dL    PLT .0 150.0 - 450.0 X10ˆ3/uL    # Neutrophil 5.8 1.5 - 7.7 X10ˆ3/uL    # Lymphocyte 1.9 1.0 - 4.0 X10ˆ3/uL    # Mixed Cells 0.7 0.1 - 1.0 X10ˆ3/uL    Neutrophil % 68.7 %    Lymphocyte % 22.7 %    Mixed Cell % 8.6 %   POCT ISTAT chem8 cartridge    Collection Time: 06/26/25  4:09 PM   Result Value Ref Range    ISTAT Sodium 141 136 - 145 mmol/L    ISTAT BUN 21 (H) 7 - 18 mg/dL    ISTAT Potassium 3.0 (L) 3.6 - 5.1 mmol/L    ISTAT Chloride 102 98 - 112 mmol/L    ISTAT Ionized Calcium 1.30 1.12 - 1.32 mmol/L    ISTAT Hematocrit 38 34 - 50 %    ISTAT Glucose 121 (H) 70 - 99 mg/dL    ISTAT TCO2 24 21 - 32 mmol/L    ISTAT Creatinine 1.10 (H) 0.55 - 1.02 mg/dL    eGFR-Cr 59 (L) >=60 mL/min/1.73m2   ISTAT Troponin    Collection Time: 06/26/25  4:32 PM   Result Value Ref Range    ISTAT Troponin <0.02 <0.045 ng/mL ng/mL     CT BRAIN OR HEAD (CPT=70450)   Final Result   PROCEDURE: CT BRAIN OR HEAD (CPT=70450)         INDICATIONS: dizziness w/ headache         COMPARISON: There are no comparisons for this exam.         TECHNIQUE: Axial CT images of the brain were obtained without the    administration of IV contrast. Coronal and Sagittal reconstructions were    obtained. CT dose reduction techniques were utilized during this    examination. Dose information is transmitted to    the ACR (American College of Radiology) NRDR (National Radiology Data    Registry)  which includes the Dose Index Registry.         FINDINGS:       The ventricles are normal in size and configuration.      There is no evidence of hemorrhage, mass, midline shift, or extra-axial    fluid collection. Normal gray-white matter differentiation. The cerebellar    tonsils are not low-lying. There is some artifact traversing the tyler    similar in appearance compared to    previous.      The visualized paranasal sinuses show no signficant sinus disease .      No evidence of depressed skull fracture.         =====   CONCLUSION:   No acute intracranial findings.         Electronically Verified and Signed by Attending Radiologist: Adrien Vilchis MD 6/26/2025 4:12 PM   Workstation: [id number]        Vitals:    06/26/25 1623   BP: 132/77   Pulse: 55   Resp:    Temp:        EKG    Rate, intervals and axes as noted on EKG Report.  Rate: 61 BPM  Rhythm: Sinus Rhythm  Reading: Normal axis.  No ST segment elevation/depression           ED Course as of 06/26/25 1744  ------------------------------------------------------------  Time: 06/26 1541  Value: POCT CBC  Comment: Negative    ------------------------------------------------------------  Time: 06/26 1603  Value: POCT CBC  Comment: (Reviewed)                       [facility]            Medical Decision Making  59-year-old well-appearing female presents with multiple nonspecific symptoms including frontal headache and nonspecific dizziness that started over 24 hours prior to arrival.  Considerations include but not limited to BPPV versus CVA versus cardiac arrhythmia versus dehydration  Plan   - PIV. Labs: cbc, I stat chem, TnI, UA  - EKG  - meds: See MAR   - orthostatic vital signs  - IV fluids 1 liter 0.9% normal saline  - reassess  -  PATIENT REPORTS IMPROVEMENT IN SYMPTOMS WITH INITIAL INTERVENTIONS  - discharge to home   - encourage oral fluid rehydration.  - encourage smoking/ alcohol cessation as these may worsen vertigo symptoms   - rx: meclizine 25mg po  bid/ prn    - return to ED if symptoms worsens        Amount and/or Complexity of Data Reviewed  Labs: ordered. Decision-making details documented in ED Course.  Radiology: ordered and independent interpretation performed. Decision-making details documented in ED Course.        Disposition and Plan     Clinical Impression:  1. Hypokalemia    2. Dehydration    3. Dizziness         Disposition:  Discharge  6/26/2025  4:34 pm    Follow-up:  Anh Rosado DO  [address]  [address]  [facility] [address]  [phone]          [facility]  [address]  [facility] [address]  [phone]              Medications Prescribed:  Discharge Medication List as of 6/26/2025  4:51 PM                Supplementary Documentation:

## 2025-07-01 DIAGNOSIS — M54.12 CERVICAL RADICULOPATHY: ICD-10-CM

## 2025-07-02 RX ORDER — GABAPENTIN 300 MG/1
600 CAPSULE ORAL 3 TIMES DAILY
Qty: 180 CAPSULE | Refills: 3 | Status: SHIPPED | OUTPATIENT
Start: 2025-07-02

## 2025-07-02 NOTE — TELEPHONE ENCOUNTER
Medication: gabapentin 300 MG Oral Cap     Date of last refill: 04/04/2025 (#180/3)  [place]     Last office visit: 04/03/2025  Due back to clinic per last office note:  Around 08/03/2025  Date next office visit scheduled:    Future Appointments   Date Time Provider Department Center   7/22/2025 12:30 PM PANDOLFI, PROCEDURE [facility] None   8/12/2025 12:00 PM Betzy Christian MD [facility] [place]   11/20/2025  1:00 PM Harriett Kiser APRN [facility]           Last OV note recommendation:    Dc  aimovig  Start qulipta 60mg  Cont keppra 500 mg bid  Imitrex, meclizine prn, refills sent  PCP to follow  See orders and medications filed with this encounter. The patient indicates understanding of these issues and agrees with the plan.  RTC 4 months  Pt should go ER for any new or worsening symptoms.  A total of 40 minutes were spent on patient care,  more than 50% was spent counseling patient/family regarding studies' results, assessment, treatment options and care plan, 10 minutes were spent in (pre- and/or during visit) reviewing clinical data including imaging, labs and progress notes related to therapy or treatment.

## 2025-07-15 ENCOUNTER — TELEPHONE (OUTPATIENT)
Dept: INTERNAL MEDICINE CLINIC | Facility: CLINIC | Age: 60
End: 2025-07-15

## 2025-07-15 DIAGNOSIS — J45.21 MILD INTERMITTENT ASTHMA WITH EXACERBATION (HCC): Primary | ICD-10-CM

## 2025-07-15 RX ORDER — ALBUTEROL SULFATE 90 UG/1
1 INHALANT RESPIRATORY (INHALATION) EVERY 6 HOURS PRN
Qty: 18 G | Refills: 0 | Status: SHIPPED | OUTPATIENT
Start: 2025-07-15

## 2025-07-15 NOTE — TELEPHONE ENCOUNTER
Patient is looking to be prescribed an inhaler due to the weather  Would you be willing to prescribe?     Pharmacy : [facility] on file

## 2025-07-22 ENCOUNTER — ANESTHESIA (OUTPATIENT)
Dept: ENDOSCOPY | Facility: HOSPITAL | Age: 60
End: 2025-07-22
Payer: MEDICARE

## 2025-07-22 ENCOUNTER — HOSPITAL ENCOUNTER (OUTPATIENT)
Facility: HOSPITAL | Age: 60
Setting detail: HOSPITAL OUTPATIENT SURGERY
Discharge: HOME | End: 2025-07-22
Attending: INTERNAL MEDICINE | Admitting: INTERNAL MEDICINE

## 2025-07-22 ENCOUNTER — ANESTHESIA EVENT (OUTPATIENT)
Dept: ENDOSCOPY | Facility: HOSPITAL | Age: 60
End: 2025-07-22
Payer: MEDICARE

## 2025-07-22 VITALS
WEIGHT: 178 LBS | OXYGEN SATURATION: 93 % | TEMPERATURE: 98 F | HEART RATE: 67 BPM | SYSTOLIC BLOOD PRESSURE: 152 MMHG | BODY MASS INDEX: 31.15 KG/M2 | HEIGHT: 63.25 IN | RESPIRATION RATE: 16 BRPM | DIASTOLIC BLOOD PRESSURE: 80 MMHG

## 2025-07-22 RX ORDER — NALOXONE HYDROCHLORIDE 0.4 MG/ML
0.08 INJECTION, SOLUTION INTRAMUSCULAR; INTRAVENOUS; SUBCUTANEOUS ONCE AS NEEDED
OUTPATIENT
Start: 2025-07-22 | End: 2025-07-22

## 2025-07-22 RX ORDER — SODIUM CHLORIDE, SODIUM LACTATE, POTASSIUM CHLORIDE, CALCIUM CHLORIDE 600; 310; 30; 20 MG/100ML; MG/100ML; MG/100ML; MG/100ML
INJECTION, SOLUTION INTRAVENOUS CONTINUOUS
OUTPATIENT
Start: 2025-07-22

## 2025-07-22 RX ORDER — SODIUM CHLORIDE, SODIUM LACTATE, POTASSIUM CHLORIDE, CALCIUM CHLORIDE 600; 310; 30; 20 MG/100ML; MG/100ML; MG/100ML; MG/100ML
INJECTION, SOLUTION INTRAVENOUS CONTINUOUS
Status: DISCONTINUED | OUTPATIENT
Start: 2025-07-22 | End: 2025-07-22

## 2025-07-22 RX ORDER — LIDOCAINE HYDROCHLORIDE 10 MG/ML
INJECTION, SOLUTION EPIDURAL; INFILTRATION; INTRACAUDAL; PERINEURAL AS NEEDED
Status: DISCONTINUED | OUTPATIENT
Start: 2025-07-22 | End: 2025-07-22 | Stop reason: SURG

## 2025-07-22 RX ADMIN — LIDOCAINE HYDROCHLORIDE 50 MG: 10 INJECTION, SOLUTION EPIDURAL; INFILTRATION; INTRACAUDAL; PERINEURAL at 12:10:00

## 2025-07-22 NOTE — DISCHARGE INSTRUCTIONS
Home Care Instructions for Endoscopic Ultrasound with Sedation    Diet:  - Resume your regular diet as tolerated unless otherwise instructed.  - Start with light meals to minimize bloating.  - Do not drink alcohol today.    Medication:  - If you have questions about resuming your normal medications, please contact your Primary Care Physician.    Activities:  - Take it easy today. Do not return to work today.  - Do not drive today.  - Do not operate any machinery today (including kitchen equipment).    Gastroscopy:  - You may have a sore throat for 2-3 days following the exam. This is normal. Gargling with warm salt water (1/2 tsp salt to 1 glass warm water) or using throat lozenges will help.  - If you experience any sharp pain in your neck, abdomen or chest, vomiting of blood, oral temperature over 100 degrees Fahrenheit, light-headedness or dizziness, or any other problems, contact your doctor.    **If unable to reach your doctor, please go to the [facility] Emergency Room**    - Your referring physician will receive a full report of your examination.  - If you do not hear from your doctor's office within two weeks of your biopsy, please call them for your results.

## 2025-07-22 NOTE — ANESTHESIA PREPROCEDURE EVALUATION
PRE-OP EVALUATION    Patient Name: Leila Alarcon    Admit Diagnosis: Gastroesophageal reflux disease, unspecified whether esophagitis present [K21.9]    Pre-op Diagnosis: Gastroesophageal reflux disease, unspecified whether esophagitis present [K21.9]    ENDOSCOPIC ULTRASOUND (EUS)    Anesthesia Procedure: ENDOSCOPIC ULTRASOUND (EUS)    Surgeons and Role:     * Vic Melo MD - Primary    Pre-op vitals reviewed.  Temp: 98 °F (36.7 °C)  Pulse: 66  Resp: 16  BP: 130/95  SpO2: 99 %  Body mass index is 31.28 kg/m².    Current medications reviewed.  Hospital Medications:  Current Medications[1]    Outpatient Medications:   Prescriptions Prior to Admission[2]    Allergies: Dust mite extract and Seasonal      Anesthesia Evaluation        Anesthetic Complications           GI/Hepatic/Renal      (+) GERD                           Cardiovascular                  (+) hypertension                                     Endo/Other                                  Pulmonary                           Neuro/Psych      (+) depression         (+) seizures  (+) neuromuscular disease  (+) headaches                 Past Surgical History[3]  Social Hx on file[4]  History   Drug Use Unknown     Available pre-op labs reviewed.  Lab Results   Component Value Date    WBC 7.6 06/18/2025    RBC 4.99 06/18/2025    HGB 14.6 06/18/2025    HCT 44.9 06/18/2025    MCV 89.1 06/26/2025    MCV 90.0 06/18/2025    MCH 29.3 06/18/2025    MCHC 33.0 06/26/2025    MCHC 32.5 06/18/2025    RDW 13.2 06/18/2025    .0 06/18/2025     Lab Results   Component Value Date     06/18/2025    K 3.7 06/18/2025     06/18/2025    CO2 24.0 06/18/2025    BUN 7 (L) 06/18/2025    CREATSERUM 1.07 (H) 06/18/2025    GLU 88 06/18/2025    CA 9.4 06/18/2025            Airway      Mallampati: II  Mouth opening: 3 FB  TM distance: < 4 cm  Neck ROM: full Cardiovascular    Cardiovascular exam normal.         Dental  Comment: No loose teeth per  patient           Pulmonary    Pulmonary exam normal.                 Other findings        ASA: 2   Plan: MAC  NPO status verified and patient meets guidelines.          Plan/risks discussed with: patient            Present on Admission:  **None**               [1]  • lactated ringers infusion   Intravenous Continuous   [2]  Facility-Administered Medications Prior to Admission   Medication Dose Route Frequency Provider Last Rate Last Admin   • cyanocobalamin (Vitamin B12) 1000 MCG/ML injection 1,000 mcg  1,000 mcg Intramuscular Q30 Days AshleeAnh gonzalez Winter, DO   1,000 mcg at 03/10/25 1210     Medications Prior to Admission   Medication Sig Dispense Refill Last Dose/Taking   • gabapentin 300 MG Oral Cap Take 2 capsules (600 mg total) by mouth in the morning, at noon, and at bedtime. 180 capsule 3 Past Week   • atorvastatin 10 MG Oral Tab Take 1 tablet (10 mg total) by mouth nightly. 90 tablet 0 Past Week   • ALPRAZolam (XANAX) 0.5 MG Oral Tab Take 1 tablet (0.5 mg total) by mouth nightly as needed. 30 tablet 0 Taking As Needed   • pantoprazole 40 MG Oral Tab EC Take 1 tablet (40 mg total) by mouth 2 (two) times daily before meals. 180 tablet 3 2025   • [] Tirzepatide-Weight Management (ZEPBOUND) 2.5 MG/0.5ML Subcutaneous Solution Auto-injector Inject 2.5 mg into the skin once a week for 4 doses. (Patient taking differently: Inject 2.5 mg into the skin once a week. INJECTIONS ON ) 2 mL 0 7/10/2025   • Atogepant (QULIPTA) 60 MG Oral Tab Take 60 mg by mouth daily. (Patient taking differently: Take 60 mg by mouth as needed.) 30 tablet 5 Past Week   • levETIRAcetam 500 MG Oral Tab Take 1 tablet (500 mg total) by mouth 2 (two) times daily. (Patient taking differently: Take 1 tablet (500 mg total) by mouth in the morning and 1 tablet (500 mg total) at noon and 1 tablet (500 mg total) in the evening.) 180 tablet 3 Past Week   • SUMAtriptan 50 MG Oral Tab TAKE 1 TABLET BY MOUTH AT ONSET OF HEADACHE. MAY  REPEAT ONCE AFTER 2 HOURS- TAKE MAXIMUM OF 2 TABLETS PER DAY 9 tablet 3 Taking   • meclizine 25 MG Oral Tab Take 1 tablet (25 mg total) by mouth 3 (three) times daily as needed for Dizziness. 30 tablet 3 Past Week   • escitalopram 10 MG Oral Tab Take 1 tablet (10 mg total) by mouth daily. 90 tablet 0 Past Week   • triamcinolone 0.1 % External Cream Use twice a day to affected area for 2 weeks then take a break for 2 weeks then apply again for 2 weeks 45 g 3 Past Week   • albuterol 108 (90 Base) MCG/ACT Inhalation Aero Soln Inhale 1 puff into the lungs every 6 (six) hours as needed. 18 g 0    • [] Tirzepatide-Weight Management (ZEPBOUND) 5 MG/0.5ML Subcutaneous Solution Auto-injector Inject 5 mg into the skin once a week for 4 doses. (Patient not taking: Reported on 2025) 6 mL 0 7/10/2025   [3]  Past Surgical History:  Procedure Laterality Date   • Bunionectomy Right    • Cholecystectomy     • Colectomy     • Colon surgery      partial resection   • Colonoscopy N/A 2024    Procedure: COLONOSCOPY;  Surgeon: Ramiro Celestin MD;  Location:  ENDOSCOPY   • Egd     • Hernia repair     • Hernia surgery     • Coral localization wire 1 site left (cpt=19281) Left     benign , several yrs ago   •      • Other surgical history     • Removal gallbladder     • Repair of hammertoe,one Right    • Wrist fracture surgery      plate and screws  left   [4]  Social History  Socioeconomic History   • Marital status: Single   Tobacco Use   • Smoking status: Every Day     Current packs/day: 0.50     Average packs/day: 0.5 packs/day for 44.6 years (22.3 ttl pk-yrs)     Types: Cigarettes     Start date: 1981   • Smokeless tobacco: Never   Vaping Use   • Vaping status: Never Used   Substance and Sexual Activity   • Alcohol use: Not Currently   • Drug use: Not Currently     Types: Cannabis, Cocaine   Other Topics Concern   • Caffeine Concern No   • Stress Concern No   • Weight Concern Yes   • Special Diet No   •  Exercise Yes   • Seat Belt Yes

## 2025-07-22 NOTE — ANESTHESIA POSTPROCEDURE EVALUATION
[facility]    Leila Alarcon Patient Status:  Hospital Outpatient Surgery   Age/Gender 59 year old female MRN [id number]   Location [facility] ENDOSCOPY PAIN [facility] Attending Vic Melo MD   Hosp Day # 0 PCP Anh Rosado DO       Anesthesia Post-op Note    ENDOSCOPIC ULTRASOUND (EUS)    Procedure Summary       Date: 07/22/25 Room / Location:  ENDOSCOPY 02 /  ENDOSCOPY    Anesthesia Start: 1207 Anesthesia Stop: 1231    Procedure: ENDOSCOPIC ULTRASOUND (EUS) Diagnosis:       Gastroesophageal reflux disease, unspecified whether esophagitis present      (EGD: HIATAL HERNIA, ESOPHAGITISL DUODENAL LESION EUS; DUODENAL LIPOMA, CHRONIC PANCREATITIS)    Surgeons: Vic Melo MD Anesthesiologist: Kathy Robles MD    Anesthesia Type: MAC ASA Status: 2            Anesthesia Type: MAC    Vitals Value Taken Time   /85 07/22/25 12:41   Temp  07/22/25 12:43   Pulse 56 07/22/25 12:43   Resp 12 07/22/25 12:43   SpO2 90 % 07/22/25 12:43   Vitals shown include unfiled device data.        Patient Location: Endoscopy    Anesthesia Type: MAC    Airway Patency: patent    Postop Pain Control: adequate    Mental Status: preanesthetic baseline    Nausea/Vomiting: none    Cardiopulmonary/Hydration status: stable euvolemic    Complications: no apparent anesthesia related complications    Postop vital signs: stable    Dental Exam: Unchanged from Preop    Patient to be discharged home when criteria met.

## 2025-07-22 NOTE — H&P
[facility]  Pre-op H and P    Leila Alarcon Patient Status:  Hospital Outpatient Surgery    1965 MRN [id number]   Location [facility] ENDOSCOPY PAIN CENTER Attending Vic Melo MD   Date 2025 PCP Anh Rosado DO     CC: 2nd portion of the duodenum, there was an elongated prominent fold vs submucosa lesion , GERD    History of Present Illness:  Leila Alarcon is a a(n) 59 year old female. 2nd portion of the duodenum, there was an elongated prominent fold vs submucosa lesion , GERD    History:  Past Medical History[1]  Past Surgical History[2]  Family History[3]   reports that she has been smoking cigarettes. She started smoking about 44 years ago. She has a 22.3 pack-year smoking history. She has never used smokeless tobacco. She reports that she does not currently use alcohol. She reports that she does not currently use drugs after having used the following drugs: Cannabis and Cocaine.    Allergies:  Allergies[4]    Medications:  Current Hospital Medications[5]    Physical Exam:    Blood pressure (!) 130/95, pulse 66, temperature 98 °F (36.7 °C), temperature source Temporal, resp. rate 16, height 5' 3.25\" (1.607 m), weight 178 lb (80.7 kg), SpO2 99%, not currently breastfeeding.    General: Appears alert, oriented x3 and in no acute distress.  CV: Normal rate   Lungs: Normal effort   Skin: Warm and dry.  Laboratory Data:       Imaging:      Assessment/Plan/Procedure:  Problem List[6]    2nd portion of the duodenum, there was an elongated prominent fold vs submucosa lesion , GERD    Plan:  EGD/EUS    Vic Melo MD  2025  11:18 AM       [1]   Past Medical History:   Abdominal pain    Allergic rhinitis    Anxiety    Anxiety state    Back pain    Back problem    DEGENERATIVE DISC    Bloating    Body piercing    Cancer (HCC)    biopsy of breast and polyps    Constipation    Crohn disease (HCC)    Decorative tattoo    Depression    Disorder of thyroid     Diverticulosis of large intestine    Dizziness    Esophageal reflux    Heartburn    History of depression    Hypothyroidism    Don't know    Indigestion    Irregular bowel habits    Itch of skin    Migraines    Nausea and vomiting    Night sweats    Obesity    Pain with bowel movements    Seizure disorder (HCC)    LAST SEIZURE UNKNOWN    Stool incontinence    Stress    Ulcerative colitis (HCC)    Vomiting    Wears glasses    Weight gain   [2]   Past Surgical History:  Procedure Laterality Date    Bunionectomy Right     Cholecystectomy      Colectomy      Colon surgery      partial resection    Colonoscopy N/A 2024    Procedure: COLONOSCOPY;  Surgeon: Ramiro Celestin MD;  Location:  ENDOSCOPY    Egd      Hernia repair      Hernia surgery      Coral localization wire 1 site left (cpt=19281) Left     benign , several yrs ago          Other surgical history      Removal gallbladder      Repair of hammertoe,one Right     Wrist fracture surgery      plate and screws  left   [3]   Family History  Problem Relation Age of Onset    Mental Disorder Mother     Diabetes Mother     Asthma Mother     Cancer Father         Father's side had different types of cancer    Diabetes Father         My aunt and uncle had cancer and passed away from it    Cancer Maternal Grandfather    [4]   Allergies  Allergen Reactions    Dust Mite Extract OTHER (SEE COMMENTS)    Seasonal OTHER (SEE COMMENTS)     Runny nose   [5]   Current Facility-Administered Medications:     lactated ringers infusion, , Intravenous, Continuous  [6]   Patient Active Problem List  Diagnosis    Cognitive deficits    Anxiety    Raynaud's phenomenon without gangrene    Excessive sleepiness    Intractable chronic migraine without aura and with status migrainosus    Class 1 obesity without serious comorbidity with body mass index (BMI) of 30.0 to 30.9 in adult    Gastroesophageal reflux disease    Primary hypertension    Cervical radiculopathy    Vitamin B12  deficiency    Prediabetes    Female stress incontinence    Nonintractable generalized idiopathic epilepsy without status epilepticus (HCC)    Irritable bowel syndrome with constipation    Moderate episode of recurrent major depressive disorder (HCC)    Low grade squamous intraepithelial lesion on cytologic smear of cervix (LGSIL)

## 2025-07-22 NOTE — OPERATIVE REPORT
Leila Alarcon Patient Status:  Hospital Outpatient Surgery    1965 MRN [id number]   [facility] ENDOSCOPY PAIN CENTER Attending Vic Melo MD   Date 2025 PCP Anh Rosado DO     PREOPERATIVE DIAGNOSIS/INDICATION: GERD, duodenal subepithelial lesion  POSTOPERTATIVE DIAGNOSIS: Esophagitis, hiatal hernia, duodenal lipoma  PROCEDURE PERFORMED: EUS/EGD  TIME OUT WAS PERFORMED    SEDATION: MAC sedation provided by General Anesthesia    INFORMED CONSENT: Risks, benefits and alternatives to the procedure were explained to the patient including but not limited to bleeding, infection, perforation, adverse drug reactions, pancreatitis and the need for hospitalization and surgery if this occurs, the patient understands and agrees to procedure.  PROCEDURE DESCRIPTION: The upper endoscope and later the therapeutic side viewing echoendoscope were introduced into the patient’s mouth, hypo pharynx, esophagus, stomach and the first and second portion of the duodenum, straightening of the endoscope was performed to obtain a direct view of the major ampulla, retroflexion was performed in the stomach with the EGD scope only. Careful  examination of the above described areas was performed on withdrawal of the endoscope. The patient tolerated the procedure well and there were no immediate complications noted during the procedure, the patient was transported to the recovery area in stable condition.  FINDINGS:    ESOPHAGUS: LA class A erosive esophagitis  GEJ Fixed 4 cm hiatal hernia, Hill grade 3  STOMACH: Normal  DUODENUM: 2nd portion 1 cm subepithelial lesion proximal to major ampulla, with normal overlying mucosa and positive pillow sign  MAJOR AMPULLA: normal  ECHO: Imaging was performed through the esophagus, stomach and duodenum. The celiac axis and the left adrenal gland appear wnl, the visualized portions of the left hepatic lobe appear wnl, the visualized pancreatic parenchyma  showed hyperechoic stranding and foci, the main PD appear wnl, the biliary tree appear wnl, the confluence of the portal vein, superior mesenteric vein and splenic vein appear wnl, there was a 9 mm hyperechoic homogeneous lesion arising from 3rd echo layer of theduodenum.  RECOMMENDATIONS:   Post EUS/EGD precautions, watch for bleeding, infection, perforation, adverse drug reactions and pancreatitis.  Call status report post procedure.  Antireflux measures  Continue PPI's    Vic Melo MD  7/22/2025  12:44 PM

## 2025-08-15 DIAGNOSIS — F41.9 ANXIETY: ICD-10-CM

## 2025-08-18 RX ORDER — ALPRAZOLAM 0.5 MG
0.5 TABLET ORAL NIGHTLY PRN
Qty: 30 TABLET | Refills: 0 | Status: SHIPPED | OUTPATIENT
Start: 2025-08-18

## (undated) DIAGNOSIS — G40.909 SEIZURE DISORDER (HCC): ICD-10-CM

## (undated) DIAGNOSIS — G40.909 SEIZURE DISORDER (HCC): Primary | ICD-10-CM

## (undated) DEVICE — GIJAW SINGLE-USE BIOPSY FORCEPS WITH NEEDLE: Brand: GIJAW

## (undated) DEVICE — KIT CUSTOM ENDOPROCEDURE STERIS

## (undated) DEVICE — Device

## (undated) DEVICE — V2 SPECIMEN COLLECTION MANIFOLD KIT: Brand: NEPTUNE

## (undated) DEVICE — STERILE POLYISOPRENE POWDER-FREE SURGICAL GLOVES: Brand: PROTEXIS

## (undated) DEVICE — UNDYED BRAIDED (POLYGLACTIN 910), SYNTHETIC ABSORBABLE SUTURE: Brand: COATED VICRYL

## (undated) DEVICE — KIT VLV 5 PC AIR H2O SUCT BX ENDOGATOR CONN

## (undated) DEVICE — BITEBLOCK ENDOSCP 60FR MAXI STRP

## (undated) DEVICE — DRAPE,U/SHT,SPLIT,FILM,60X84,STERILE: Brand: MEDLINE

## (undated) DEVICE — SOL NACL IRRIG 0.9% 1000ML BTL

## (undated) DEVICE — 10FT COMBINED O2 DELIVERY/CO2 MONITORING. FILTER WITH MICROSTREAM TYPE LUER: Brand: DUAL ADULT NASAL CANNULA

## (undated) DEVICE — PRECISION (7.0 X 0.51 X 29.5MM)

## (undated) DEVICE — SLEEVE KENDALL SCD EXPRESS MED

## (undated) DEVICE — CURAD OIL EMLUSION GAUZE 3X3

## (undated) DEVICE — #15 STERILE STAINLESS BLADE: Brand: STERILE STAINLESS BLADES

## (undated) DEVICE — NON-THREADED KWIRE: Brand: MINI

## (undated) DEVICE — 3M™ RED DOT™ MONITORING ELECTRODE WITH FOAM TAPE AND STICKY GEL 2570-3, 3/BAG, 200/CASE, 54/PLT: Brand: RED DOT™

## (undated) DEVICE — CANNULA NSL AD W/ FLTR 14FT O2/CO2

## (undated) DEVICE — SUT ETHILON 4-0 PS-2 1667H

## (undated) DEVICE — KIT MFLD FOR SPEC COLL

## (undated) DEVICE — SUT VICRYL 3-0 CT-2 J232H

## (undated) DEVICE — BALLOON HEMOSTATIC EUS LINEAR

## (undated) DEVICE — 1200CC GUARDIAN II: Brand: GUARDIAN

## (undated) DEVICE — CANISTER SUCT 1200CC LID BLU HRD ADPT AUTO

## (undated) DEVICE — BANDAGE,GAUZE,CONFORMING,3X4.1Y,STRL,LF: Brand: MEDLINE

## (undated) DEVICE — BLADE SAW KM33-11

## (undated) DEVICE — DISPOSABLE TOURNIQUET CUFF SINGLE BLADDER, DUAL PORT AND QUICK CONNECT CONNECTOR: Brand: COLOR CUFF

## (undated) DEVICE — BNDG COHESIVE W4INXL5YD TAN E

## (undated) DEVICE — ELECTRODE EKG W3.5XL4CM ABRAD W1.25XL1.5IN

## (undated) DEVICE — LOWER EXTREMITY CDS-LF: Brand: MEDLINE INDUSTRIES, INC.

## (undated) NOTE — LETTER
11/26/21        [address]      Dear Tenzin Rico records indicate that you have outstanding lab work and or testing that was ordered for you and has not yet been completed:  Orders Placed This Encounter

## (undated) NOTE — LETTER
23   [facility] Orthopedic Surgery   Pre-Operative Clearance Request    Patient Name:   Danielle Sparrow             :   1965    Surgeon: Dr. Helen Arzate             Date of Surgery:2023    Surgical Procedure: Right Second  PIP Correction of Hammertoe, First metatarsal osteotomy, Weil osteotomy 2nd metatarsal all right foot      Please complete all of the following 2-3 weeks PRIOR TO your scheduled surgery to avoid potential cancellation. [x]  History and Physical        [x]  Medical  Clearance                         Required pre-op testing to be ordered:                                                                                                                                                                                                                                                                                                          Please fax test results, H&P, and clearance to [phone] and to [facility] at [phone]**

## (undated) NOTE — LETTER
December 1, 2023    Patient: Lyle Arguello   Date of Visit: 12/1/2023       To Whom It May Concern:    Vida Yost was seen and treated in our emergency department on 12/1/2023. She should not return to work until December 4 May return sooner if better . If you have any questions or concerns, please don't hesitate to call.        Encounter Provider(s):    Modesta Cheadle, MD

## (undated) NOTE — LETTER
21    Patient: Mariela Hogan   : 1965    Please follow the below seizure precautions, until seizure-free for more than 6 months, and cleared by your physician:    No driving  No climbing ladders  No swimming alone  No tub baths  No operatin

## (undated) NOTE — LETTER
24    Re: Leila Alarcon  : [birth year]      To Whom It May Concern,  This patient is being followed by our office for a medical condition. From a Neurological standpoint, she is cleared for her upcoming procedure and to have anesthesia. Please allow her to resume her medications as soon as possible after the procedure.   Please note this is only a Neurological clearance which does not replace other necessary clearances from specialties such as, but not limited to: Internal Medicine, Cardiology, or Pulmonary Medicine.     If this office can be of further assistance, please do not hesitate to contact us.     Sincerely,      Alejandra \"Adrien\" MD [facility]  Neurology  [facility]    Diplomate, Neurology ([employer])  Diplomate, Headache Medicine ([employer])

## (undated) NOTE — LETTER
03/10/22        [address] [facility]      Dear Carolyn Jacome records indicate that you have outstanding lab work and or testing that was ordered for you and has not yet been completed:  Orders Placed This Encounter      [place], Direct, Reflex To [address] [E]      [address] [facility] (CPT=[id number])    To provide you with the best possible care, please complete these orders at your earliest convenience. If you have recently completed these orders please disregard this letter. If you have any questions please call the office at Dept: [phone].      Thank you,       Anh Ram, DO

## (undated) NOTE — Clinical Note
[facility] EMU inpatient EEG monitoring needed , please advise as to how to proceed, thanks, Michelle Oswald

## (undated) NOTE — LETTER
ASTHMA ACTION PLAN for Leila Alarcon     : 1965          Date: 2025    Anh Rosado,   [facility], [facility]  [address]  [place] [address] [phone] 1.  GREEN - GO!  % Personal Best Peak Flow. Use controller medicine.   2.  YELLOW - Caution. 50-79% Personal Best Peak Flow.  Use reliever meds.   3.  RED - Stop. <50% Personal Best Peak Flow.  Get help from a doctor.  SDon’t forget to rinse your mouth after using steroid inhalers.  RRemember to get your Flu vaccine every fall!      ACT Goal: 20 or greater    1. GREEN - Go! Use controller medicine.   Breathing is good, No cough or wheeze, Can work and play Medicine:  No regular controller medications           2. YELLOW - Caution. Take reliever medicine to keep asthma attack from getting bad.   Cough, Wheezing, Tight Chest, Wake up at night Medicine:  Call PCP if no relief or symptoms are worse after 2 treatments  Albuterol Inhaler (Proair):  2 puffs every 4 to 6 hours as needed           3. RED - Stop! Danger! Get help from a doctor now!   Medicine not helping, Breathing hard & fast, Nose opens wide, Can't walk, Ribs show, Can't talk well Medicine:   Call 911 or go to Emergency Room if no relief after 2 treatments or symptoms are worse  Albuterol Inhaler (Proair): 2 puffs every 20 minutes    If your symptoms do not improve and you cannot contact your doctor, go to the emergency room or call 911 immediately!   Seek medical attention if you require your rescue inhaler/medication more than 2-3 times in a 24 hour period. If you require your rescue inhaler/medication more than 2-3 times weekly, your asthma may not be under proper control and you should contact your healthcare provider.   Please schedule your follow-up asthma appointment today!  [x] Asthma Action Plan reviewed with patient (and caregiver if necessary) and a copy of the plan was given to the patient/caregiver.   []  Asthma Action Plan reviewed with patient (and caregiver if necessary) on the phone and mailed copy to patient.         Physician Patient Caretaker (if necessary)   DO Leila Sellers